# Patient Record
Sex: MALE | Race: WHITE | NOT HISPANIC OR LATINO | Employment: OTHER | URBAN - METROPOLITAN AREA
[De-identification: names, ages, dates, MRNs, and addresses within clinical notes are randomized per-mention and may not be internally consistent; named-entity substitution may affect disease eponyms.]

---

## 2017-12-08 ENCOUNTER — HOSPITAL ENCOUNTER (INPATIENT)
Facility: MEDICAL CENTER | Age: 52
LOS: 2 days | DRG: 247 | End: 2017-12-10
Attending: EMERGENCY MEDICINE | Admitting: HOSPITALIST
Payer: COMMERCIAL

## 2017-12-08 ENCOUNTER — HOSPITAL ENCOUNTER (OUTPATIENT)
Dept: RADIOLOGY | Facility: MEDICAL CENTER | Age: 52
End: 2017-12-08

## 2017-12-08 ENCOUNTER — RESOLUTE PROFESSIONAL BILLING HOSPITAL PROF FEE (OUTPATIENT)
Dept: HOSPITALIST | Facility: MEDICAL CENTER | Age: 52
End: 2017-12-08
Payer: COMMERCIAL

## 2017-12-08 ENCOUNTER — APPOINTMENT (OUTPATIENT)
Dept: RADIOLOGY | Facility: MEDICAL CENTER | Age: 52
DRG: 247 | End: 2017-12-08
Attending: INTERNAL MEDICINE
Payer: COMMERCIAL

## 2017-12-08 ENCOUNTER — APPOINTMENT (OUTPATIENT)
Dept: RADIOLOGY | Facility: MEDICAL CENTER | Age: 52
DRG: 247 | End: 2017-12-08
Attending: EMERGENCY MEDICINE
Payer: COMMERCIAL

## 2017-12-08 DIAGNOSIS — R51.9 ACUTE NONINTRACTABLE HEADACHE, UNSPECIFIED HEADACHE TYPE: ICD-10-CM

## 2017-12-08 DIAGNOSIS — I21.4 NSTEMI (NON-ST ELEVATED MYOCARDIAL INFARCTION) (HCC): ICD-10-CM

## 2017-12-08 PROBLEM — E11.9 TYPE 2 DIABETES MELLITUS (HCC): Status: ACTIVE | Noted: 2017-12-08

## 2017-12-08 PROBLEM — I10 ESSENTIAL HYPERTENSION: Status: ACTIVE | Noted: 2017-12-08

## 2017-12-08 PROBLEM — G43.909 MIGRAINE: Status: ACTIVE | Noted: 2017-12-08

## 2017-12-08 PROBLEM — E78.5 DYSLIPIDEMIA: Status: ACTIVE | Noted: 2017-12-08

## 2017-12-08 LAB
ALBUMIN SERPL BCP-MCNC: 4.3 G/DL (ref 3.2–4.9)
ALBUMIN/GLOB SERPL: 1.7 G/DL
ALP SERPL-CCNC: 81 U/L (ref 30–99)
ALT SERPL-CCNC: 23 U/L (ref 2–50)
ANION GAP SERPL CALC-SCNC: 11 MMOL/L (ref 0–11.9)
APTT PPP: 36.1 SEC (ref 24.7–36)
AST SERPL-CCNC: 25 U/L (ref 12–45)
BASOPHILS # BLD AUTO: 0.5 % (ref 0–1.8)
BASOPHILS # BLD: 0.06 K/UL (ref 0–0.12)
BILIRUB SERPL-MCNC: 0.6 MG/DL (ref 0.1–1.5)
BNP SERPL-MCNC: 42 PG/ML (ref 0–100)
BUN SERPL-MCNC: 14 MG/DL (ref 8–22)
CALCIUM SERPL-MCNC: 9.4 MG/DL (ref 8.5–10.5)
CHLORIDE SERPL-SCNC: 102 MMOL/L (ref 96–112)
CO2 SERPL-SCNC: 23 MMOL/L (ref 20–33)
CREAT SERPL-MCNC: 0.66 MG/DL (ref 0.5–1.4)
EKG IMPRESSION: NORMAL
EOSINOPHIL # BLD AUTO: 0.11 K/UL (ref 0–0.51)
EOSINOPHIL NFR BLD: 1 % (ref 0–6.9)
ERYTHROCYTE [DISTWIDTH] IN BLOOD BY AUTOMATED COUNT: 41.6 FL (ref 35.9–50)
EST. AVERAGE GLUCOSE BLD GHB EST-MCNC: 197 MG/DL
GFR SERPL CREATININE-BSD FRML MDRD: >60 ML/MIN/1.73 M 2
GLOBULIN SER CALC-MCNC: 2.5 G/DL (ref 1.9–3.5)
GLUCOSE BLD-MCNC: 223 MG/DL (ref 65–99)
GLUCOSE SERPL-MCNC: 157 MG/DL (ref 65–99)
HBA1C MFR BLD: 8.5 % (ref 0–5.6)
HCT VFR BLD AUTO: 42.5 % (ref 42–52)
HGB BLD-MCNC: 14.4 G/DL (ref 14–18)
IMM GRANULOCYTES # BLD AUTO: 0.03 K/UL (ref 0–0.11)
IMM GRANULOCYTES NFR BLD AUTO: 0.3 % (ref 0–0.9)
INR PPP: 1 (ref 0.87–1.13)
INR PPP: 1.1 (ref 0.87–1.13)
LIPASE SERPL-CCNC: 26 U/L (ref 11–82)
LYMPHOCYTES # BLD AUTO: 3.11 K/UL (ref 1–4.8)
LYMPHOCYTES NFR BLD: 28.2 % (ref 22–41)
MCH RBC QN AUTO: 29.5 PG (ref 27–33)
MCHC RBC AUTO-ENTMCNC: 33.9 G/DL (ref 33.7–35.3)
MCV RBC AUTO: 87.1 FL (ref 81.4–97.8)
MONOCYTES # BLD AUTO: 0.85 K/UL (ref 0–0.85)
MONOCYTES NFR BLD AUTO: 7.7 % (ref 0–13.4)
NEUTROPHILS # BLD AUTO: 6.86 K/UL (ref 1.82–7.42)
NEUTROPHILS NFR BLD: 62.3 % (ref 44–72)
NRBC # BLD AUTO: 0 K/UL
NRBC BLD AUTO-RTO: 0 /100 WBC
PLATELET # BLD AUTO: 304 K/UL (ref 164–446)
PMV BLD AUTO: 9.4 FL (ref 9–12.9)
POTASSIUM SERPL-SCNC: 3.9 MMOL/L (ref 3.6–5.5)
PROT SERPL-MCNC: 6.8 G/DL (ref 6–8.2)
PROTHROMBIN TIME: 12.9 SEC (ref 12–14.6)
PROTHROMBIN TIME: 13.9 SEC (ref 12–14.6)
RBC # BLD AUTO: 4.88 M/UL (ref 4.7–6.1)
SODIUM SERPL-SCNC: 136 MMOL/L (ref 135–145)
TROPONIN I SERPL-MCNC: 2.79 NG/ML (ref 0–0.04)
TROPONIN I SERPL-MCNC: 4.72 NG/ML (ref 0–0.04)
WBC # BLD AUTO: 11 K/UL (ref 4.8–10.8)

## 2017-12-08 PROCEDURE — 90732 PPSV23 VACC 2 YRS+ SUBQ/IM: CPT | Performed by: HOSPITALIST

## 2017-12-08 PROCEDURE — 85610 PROTHROMBIN TIME: CPT | Mod: 91

## 2017-12-08 PROCEDURE — 99285 EMERGENCY DEPT VISIT HI MDM: CPT

## 2017-12-08 PROCEDURE — 770020 HCHG ROOM/CARE - TELE (206)

## 2017-12-08 PROCEDURE — 80053 COMPREHEN METABOLIC PANEL: CPT

## 2017-12-08 PROCEDURE — 83690 ASSAY OF LIPASE: CPT

## 2017-12-08 PROCEDURE — 82962 GLUCOSE BLOOD TEST: CPT

## 2017-12-08 PROCEDURE — 700111 HCHG RX REV CODE 636 W/ 250 OVERRIDE (IP): Performed by: HOSPITALIST

## 2017-12-08 PROCEDURE — 3E0234Z INTRODUCTION OF SERUM, TOXOID AND VACCINE INTO MUSCLE, PERCUTANEOUS APPROACH: ICD-10-PCS | Performed by: HOSPITALIST

## 2017-12-08 PROCEDURE — A9270 NON-COVERED ITEM OR SERVICE: HCPCS | Performed by: INTERNAL MEDICINE

## 2017-12-08 PROCEDURE — 93005 ELECTROCARDIOGRAM TRACING: CPT

## 2017-12-08 PROCEDURE — 71020 DX-CHEST-2 VIEWS: CPT

## 2017-12-08 PROCEDURE — 93005 ELECTROCARDIOGRAM TRACING: CPT | Performed by: EMERGENCY MEDICINE

## 2017-12-08 PROCEDURE — 700102 HCHG RX REV CODE 250 W/ 637 OVERRIDE(OP): Performed by: STUDENT IN AN ORGANIZED HEALTH CARE EDUCATION/TRAINING PROGRAM

## 2017-12-08 PROCEDURE — 36415 COLL VENOUS BLD VENIPUNCTURE: CPT

## 2017-12-08 PROCEDURE — 99223 1ST HOSP IP/OBS HIGH 75: CPT | Performed by: HOSPITALIST

## 2017-12-08 PROCEDURE — 83880 ASSAY OF NATRIURETIC PEPTIDE: CPT

## 2017-12-08 PROCEDURE — 90471 IMMUNIZATION ADMIN: CPT

## 2017-12-08 PROCEDURE — 84484 ASSAY OF TROPONIN QUANT: CPT | Mod: 91

## 2017-12-08 PROCEDURE — 700102 HCHG RX REV CODE 250 W/ 637 OVERRIDE(OP): Performed by: INTERNAL MEDICINE

## 2017-12-08 PROCEDURE — 70450 CT HEAD/BRAIN W/O DYE: CPT

## 2017-12-08 PROCEDURE — 90686 IIV4 VACC NO PRSV 0.5 ML IM: CPT | Performed by: HOSPITALIST

## 2017-12-08 PROCEDURE — A9270 NON-COVERED ITEM OR SERVICE: HCPCS | Performed by: STUDENT IN AN ORGANIZED HEALTH CARE EDUCATION/TRAINING PROGRAM

## 2017-12-08 PROCEDURE — 83036 HEMOGLOBIN GLYCOSYLATED A1C: CPT

## 2017-12-08 PROCEDURE — 700111 HCHG RX REV CODE 636 W/ 250 OVERRIDE (IP): Performed by: INTERNAL MEDICINE

## 2017-12-08 PROCEDURE — 700105 HCHG RX REV CODE 258: Performed by: STUDENT IN AN ORGANIZED HEALTH CARE EDUCATION/TRAINING PROGRAM

## 2017-12-08 PROCEDURE — 93010 ELECTROCARDIOGRAM REPORT: CPT | Performed by: INTERNAL MEDICINE

## 2017-12-08 PROCEDURE — 85025 COMPLETE CBC W/AUTO DIFF WBC: CPT

## 2017-12-08 PROCEDURE — 83735 ASSAY OF MAGNESIUM: CPT

## 2017-12-08 PROCEDURE — 93005 ELECTROCARDIOGRAM TRACING: CPT | Performed by: STUDENT IN AN ORGANIZED HEALTH CARE EDUCATION/TRAINING PROGRAM

## 2017-12-08 PROCEDURE — 85730 THROMBOPLASTIN TIME PARTIAL: CPT

## 2017-12-08 RX ORDER — POLYETHYLENE GLYCOL 3350 17 G/17G
1 POWDER, FOR SOLUTION ORAL
Status: DISCONTINUED | OUTPATIENT
Start: 2017-12-08 | End: 2017-12-10 | Stop reason: HOSPADM

## 2017-12-08 RX ORDER — ONDANSETRON 2 MG/ML
4 INJECTION INTRAMUSCULAR; INTRAVENOUS EVERY 4 HOURS PRN
Status: DISCONTINUED | OUTPATIENT
Start: 2017-12-08 | End: 2017-12-09

## 2017-12-08 RX ORDER — LABETALOL HYDROCHLORIDE 5 MG/ML
10 INJECTION, SOLUTION INTRAVENOUS EVERY 4 HOURS PRN
Status: DISCONTINUED | OUTPATIENT
Start: 2017-12-08 | End: 2017-12-10 | Stop reason: HOSPADM

## 2017-12-08 RX ORDER — SODIUM CHLORIDE 9 MG/ML
INJECTION, SOLUTION INTRAVENOUS CONTINUOUS
Status: DISCONTINUED | OUTPATIENT
Start: 2017-12-08 | End: 2017-12-10

## 2017-12-08 RX ORDER — LISINOPRIL 10 MG/1
10 TABLET ORAL EVERY EVENING
Status: DISCONTINUED | OUTPATIENT
Start: 2017-12-08 | End: 2017-12-10 | Stop reason: HOSPADM

## 2017-12-08 RX ORDER — BISACODYL 10 MG
10 SUPPOSITORY, RECTAL RECTAL
Status: DISCONTINUED | OUTPATIENT
Start: 2017-12-08 | End: 2017-12-10 | Stop reason: HOSPADM

## 2017-12-08 RX ORDER — ATORVASTATIN CALCIUM 80 MG/1
80 TABLET, FILM COATED ORAL
Status: DISCONTINUED | OUTPATIENT
Start: 2017-12-08 | End: 2017-12-10 | Stop reason: HOSPADM

## 2017-12-08 RX ORDER — AMOXICILLIN 250 MG
2 CAPSULE ORAL 2 TIMES DAILY
Status: DISCONTINUED | OUTPATIENT
Start: 2017-12-08 | End: 2017-12-10 | Stop reason: HOSPADM

## 2017-12-08 RX ORDER — CHLORAL HYDRATE 500 MG
1000 CAPSULE ORAL 2 TIMES DAILY
COMMUNITY
End: 2018-01-29

## 2017-12-08 RX ORDER — HEPARIN SODIUM 1000 [USP'U]/ML
3800 INJECTION, SOLUTION INTRAVENOUS; SUBCUTANEOUS PRN
Status: DISCONTINUED | OUTPATIENT
Start: 2017-12-09 | End: 2017-12-08

## 2017-12-08 RX ORDER — LISINOPRIL 10 MG/1
10 TABLET ORAL EVERY MORNING
COMMUNITY
End: 2017-12-20 | Stop reason: SDUPTHER

## 2017-12-08 RX ORDER — ATORVASTATIN CALCIUM 20 MG/1
80 TABLET, FILM COATED ORAL EVERY MORNING
Status: DISCONTINUED | OUTPATIENT
Start: 2017-12-08 | End: 2017-12-08

## 2017-12-08 RX ORDER — PROMETHAZINE HYDROCHLORIDE 25 MG/1
12.5-25 SUPPOSITORY RECTAL EVERY 4 HOURS PRN
Status: DISCONTINUED | OUTPATIENT
Start: 2017-12-08 | End: 2017-12-10 | Stop reason: HOSPADM

## 2017-12-08 RX ORDER — ALBUTEROL SULFATE 90 UG/1
2 AEROSOL, METERED RESPIRATORY (INHALATION) EVERY 4 HOURS PRN
COMMUNITY

## 2017-12-08 RX ORDER — ATORVASTATIN CALCIUM 10 MG/1
10 TABLET, FILM COATED ORAL EVERY MORNING
Status: ON HOLD | COMMUNITY
End: 2017-12-10

## 2017-12-08 RX ORDER — BUTALBITAL, ACETAMINOPHEN AND CAFFEINE 50; 325; 40 MG/1; MG/1; MG/1
1 TABLET ORAL ONCE
Status: COMPLETED | OUTPATIENT
Start: 2017-12-08 | End: 2017-12-08

## 2017-12-08 RX ORDER — ONDANSETRON 4 MG/1
4 TABLET, ORALLY DISINTEGRATING ORAL EVERY 4 HOURS PRN
Status: DISCONTINUED | OUTPATIENT
Start: 2017-12-08 | End: 2017-12-10 | Stop reason: HOSPADM

## 2017-12-08 RX ORDER — ACETAMINOPHEN 325 MG/1
650 TABLET ORAL EVERY 6 HOURS PRN
Status: DISCONTINUED | OUTPATIENT
Start: 2017-12-08 | End: 2017-12-10 | Stop reason: HOSPADM

## 2017-12-08 RX ORDER — HEPARIN SODIUM 1000 [USP'U]/ML
3800 INJECTION, SOLUTION INTRAVENOUS; SUBCUTANEOUS PRN
Status: DISCONTINUED | OUTPATIENT
Start: 2017-12-08 | End: 2017-12-08

## 2017-12-08 RX ORDER — DEXTROSE MONOHYDRATE 25 G/50ML
25 INJECTION, SOLUTION INTRAVENOUS
Status: DISCONTINUED | OUTPATIENT
Start: 2017-12-08 | End: 2017-12-10 | Stop reason: HOSPADM

## 2017-12-08 RX ORDER — ALBUTEROL SULFATE 90 UG/1
2 AEROSOL, METERED RESPIRATORY (INHALATION) EVERY 4 HOURS PRN
Status: DISCONTINUED | OUTPATIENT
Start: 2017-12-08 | End: 2017-12-10 | Stop reason: HOSPADM

## 2017-12-08 RX ORDER — HEPARIN SODIUM 1000 [USP'U]/ML
3800 INJECTION, SOLUTION INTRAVENOUS; SUBCUTANEOUS PRN
Status: DISCONTINUED | OUTPATIENT
Start: 2017-12-08 | End: 2017-12-09

## 2017-12-08 RX ORDER — PROMETHAZINE HYDROCHLORIDE 25 MG/1
12.5-25 TABLET ORAL EVERY 4 HOURS PRN
Status: DISCONTINUED | OUTPATIENT
Start: 2017-12-08 | End: 2017-12-10 | Stop reason: HOSPADM

## 2017-12-08 RX ORDER — ASPIRIN 325 MG
325 TABLET ORAL DAILY
Status: DISCONTINUED | OUTPATIENT
Start: 2017-12-08 | End: 2017-12-09

## 2017-12-08 RX ORDER — SODIUM CHLORIDE 9 MG/ML
INJECTION, SOLUTION INTRAVENOUS CONTINUOUS
Status: DISCONTINUED | OUTPATIENT
Start: 2017-12-09 | End: 2017-12-09

## 2017-12-08 RX ADMIN — BUTALBITAL, ACETAMINOPHEN, AND CAFFEINE 1 TABLET: 50; 325; 40 TABLET ORAL at 20:10

## 2017-12-08 RX ADMIN — METOPROLOL TARTRATE 25 MG: 25 TABLET ORAL at 17:35

## 2017-12-08 RX ADMIN — PNEUMOCOCCAL VACCINE POLYVALENT 25 MCG
25; 25; 25; 25; 25; 25; 25; 25; 25; 25; 25; 25; 25; 25; 25; 25; 25; 25; 25; 25; 25; 25; 25 INJECTION, SOLUTION INTRAMUSCULAR; SUBCUTANEOUS at 21:05

## 2017-12-08 RX ADMIN — SODIUM CHLORIDE: 9 INJECTION, SOLUTION INTRAVENOUS at 21:22

## 2017-12-08 RX ADMIN — INSULIN HUMAN 3 UNITS: 100 INJECTION, SOLUTION PARENTERAL at 21:02

## 2017-12-08 RX ADMIN — INFLUENZA A VIRUS A/MICHIGAN/45/2015 X-275 (H1N1) ANTIGEN (FORMALDEHYDE INACTIVATED), INFLUENZA A VIRUS A/HONG KONG/4801/2014 X-263B (H3N2) ANTIGEN (FORMALDEHYDE INACTIVATED), INFLUENZA B VIRUS B/PHUKET/3073/2013 ANTIGEN (FORMALDEHYDE INACTIVATED), AND INFLUENZA B VIRUS B/BRISBANE/60/2008 ANTIGEN (FORMALDEHYDE INACTIVATED) 0.5 ML: 15; 15; 15; 15 INJECTION, SUSPENSION INTRAMUSCULAR at 21:03

## 2017-12-08 RX ADMIN — HEPARIN SODIUM 1450 UNITS/HR: 5000 INJECTION, SOLUTION INTRAVENOUS at 22:33

## 2017-12-08 RX ADMIN — ATORVASTATIN CALCIUM 80 MG: 80 TABLET, FILM COATED ORAL at 21:03

## 2017-12-08 RX ADMIN — LISINOPRIL 10 MG: 10 TABLET ORAL at 21:03

## 2017-12-08 RX ADMIN — STANDARDIZED SENNA CONCENTRATE AND DOCUSATE SODIUM 2 TABLET: 8.6; 5 TABLET, FILM COATED ORAL at 21:03

## 2017-12-08 ASSESSMENT — PATIENT HEALTH QUESTIONNAIRE - PHQ9
SUM OF ALL RESPONSES TO PHQ9 QUESTIONS 1 AND 2: 0
2. FEELING DOWN, DEPRESSED, IRRITABLE, OR HOPELESS: NOT AT ALL
SUM OF ALL RESPONSES TO PHQ QUESTIONS 1-9: 0
1. LITTLE INTEREST OR PLEASURE IN DOING THINGS: NOT AT ALL

## 2017-12-08 ASSESSMENT — ENCOUNTER SYMPTOMS
SENSORY CHANGE: 0
FOCAL WEAKNESS: 0
CLAUDICATION: 0
BRUISES/BLEEDS EASILY: 0
CONSTIPATION: 0
SPUTUM PRODUCTION: 0
PALPITATIONS: 0
VOMITING: 0
ABDOMINAL PAIN: 0
DOUBLE VISION: 0
TREMORS: 0
WEIGHT LOSS: 0
SEIZURES: 0
FEVER: 0
CHILLS: 0
SPEECH CHANGE: 0
NAUSEA: 0
POLYDIPSIA: 0
WHEEZING: 0
COUGH: 0
HEADACHES: 1
ORTHOPNEA: 0
DIAPHORESIS: 0
DIARRHEA: 0
SHORTNESS OF BREATH: 1
DEPRESSION: 0
TINGLING: 0
DIZZINESS: 0
HEARTBURN: 0
BLURRED VISION: 0
BACK PAIN: 0
PHOTOPHOBIA: 0
NECK PAIN: 0

## 2017-12-08 ASSESSMENT — LIFESTYLE VARIABLES
CONSUMPTION TOTAL: NEGATIVE
TOTAL SCORE: 0
HAVE PEOPLE ANNOYED YOU BY CRITICIZING YOUR DRINKING: NO
EVER HAD A DRINK FIRST THING IN THE MORNING TO STEADY YOUR NERVES TO GET RID OF A HANGOVER: NO
ON A TYPICAL DAY WHEN YOU DRINK ALCOHOL HOW MANY DRINKS DO YOU HAVE: 1
TOTAL SCORE: 0
EVER FELT BAD OR GUILTY ABOUT YOUR DRINKING: NO
SUBSTANCE_ABUSE: 0
TOTAL SCORE: 0
HAVE YOU EVER FELT YOU SHOULD CUT DOWN ON YOUR DRINKING: NO
EVER_SMOKED: YES
AVERAGE NUMBER OF DAYS PER WEEK YOU HAVE A DRINK CONTAINING ALCOHOL: .5
ALCOHOL_USE: YES
HOW MANY TIMES IN THE PAST YEAR HAVE YOU HAD 5 OR MORE DRINKS IN A DAY: 0

## 2017-12-08 ASSESSMENT — PAIN SCALES - GENERAL
PAINLEVEL_OUTOF10: 4
PAINLEVEL_OUTOF10: 4

## 2017-12-08 NOTE — ED PROVIDER NOTES
ED Provider Note    Scribed for Victor M Dial M.D. by Sridevi Fajardo. 12/8/2017  3:14 PM    Primary care provider: No primary care provider   Means of arrival: EMS  History obtained from: patient  History limited by: none    CHIEF COMPLAINT  Chief Complaint   Patient presents with   • Migraine     since sunday   • Abnormal Labs     trop 1.7     HPI  Benoit Barkley is a 52 y.o. Male with a history of diabetes, hypertension, and migraines who presents to the Emergency Department by [facility].  The patient describes this as normal migraine pain, but it radiated down to his chest as well. He denies any chest pain at this time. The patient denies any shortness of breath, leg swelling, or diaphoresis. The [facility] which [facility]. He denies any recent head trauma. But he has recently altered his diabetes medications. The patient has a familial history of heart attack and states that his mother had a heart attack at 40. He denies any blood thinners.     REVIEW OF SYSTEMS  Pertinent positives include migraine. Pertinent negatives include no shortness of breath, leg swelling, diaphoresis.  All other systems reviewed and negative. C    PAST MEDICAL HISTORY   has a past medical history of Dyslipidemia (12/8/2017) and NSTEMI (non-ST elevated myocardial infarction) (CMS-HCC) (12/8/2017). No chronic medical problems    SURGICAL HISTORY  patient denies any surgical history    SOCIAL HISTORY  Social History   Substance Use Topics   • Smoking status: No   • Smokeless tobacco: No   • Alcohol use No      History   Drug use: Unknown     FAMILY HISTORY  History reviewed. No pertinent family history.    CURRENT MEDICATIONS  Home Medications     Reviewed by Trav Wilson (Pharmacy Tech) on 12/08/17 at 1610  Med List Status: Complete   Medication Last Dose Status   albuterol 108 (90 Base) MCG/ACT Aero Soln inhalation  "aerosol 12/4/2017 Active   aspirin EC (ECOTRIN) 81 MG Tablet Delayed Response 12/8/2017 Active   atorvastatin (LIPITOR) 10 MG Tab 12/8/2017 Active   lisinopril (PRINIVIL) 10 MG Tab 12/7/2017 Active   metformin (GLUCOPHAGE) 1000 MG tablet 12/8/2017 Active   multivitamin (THERAGRAN) Tab 12/8/2017 Active   Omega-3 Fatty Acids (FISH OIL) 1000 MG Cap capsule 12/8/2017 Active              ALLERGIES  No Known Allergies    PHYSICAL EXAM  VITAL SIGNS: /64   Pulse 82   Temp 37.2 °C (99 °F)   Resp 18   Ht 1.93 m (6' 4\")   Wt 116.6 kg (257 lb)   SpO2 91%   BMI 31.28 kg/m²     Constitutional: Well developed, Well nourished, no distress, Non-toxic appearance.   HENT: Normocephalic, Atraumatic, Bilateral external ears normal, Oropharynx moist, No oral exudates.   Eyes: PERRLA, EOMI, Conjunctiva normal, No discharge.   Neck: No tenderness, Supple, No stridor.   Lymphatic: No lymphadenopathy noted.   Cardiovascular: Normal heart rate, Normal rhythm.   Thorax & Lungs: Clear to auscultation bilaterally, No respiratory distress, No wheezing, No crackles.   Abdomen: Soft, No tenderness, No masses, No pulsatile masses.   Skin: Warm, Dry, No erythema, No rash.   Extremities:, No edema No cyanosis.   Musculoskeletal: No major deformities noted.  Intact distal pulses. Slight paraspinal tenderness  Neurologic: Awake, alert. Moves all extremities spontaneously.  Psychiatric: Affect normal, Judgment normal, Mood normal.     LABS  Results for orders placed or performed during the hospital encounter of 12/08/17   Troponin   Result Value Ref Range    Troponin I 4.72 (H) 0.00 - 0.04 ng/mL   Btype Natriuretic Peptide   Result Value Ref Range    B Natriuretic Peptide 42 0 - 100 pg/mL   CBC with Differential   Result Value Ref Range    WBC 11.0 (H) 4.8 - 10.8 K/uL    RBC 4.88 4.70 - 6.10 M/uL    Hemoglobin 14.4 14.0 - 18.0 g/dL    Hematocrit 42.5 42.0 - 52.0 %    MCV 87.1 81.4 - 97.8 fL    MCH 29.5 27.0 - 33.0 pg    MCHC 33.9 33.7 - 35.3 " g/dL    RDW 41.6 35.9 - 50.0 fL    Platelet Count 304 164 - 446 K/uL    MPV 9.4 9.0 - 12.9 fL    Neutrophils-Polys 62.30 44.00 - 72.00 %    Lymphocytes 28.20 22.00 - 41.00 %    Monocytes 7.70 0.00 - 13.40 %    Eosinophils 1.00 0.00 - 6.90 %    Basophils 0.50 0.00 - 1.80 %    Immature Granulocytes 0.30 0.00 - 0.90 %    Nucleated RBC 0.00 /100 WBC    Neutrophils (Absolute) 6.86 1.82 - 7.42 K/uL    Lymphs (Absolute) 3.11 1.00 - 4.80 K/uL    Monos (Absolute) 0.85 0.00 - 0.85 K/uL    Eos (Absolute) 0.11 0.00 - 0.51 K/uL    Baso (Absolute) 0.06 0.00 - 0.12 K/uL    Immature Granulocytes (abs) 0.03 0.00 - 0.11 K/uL    NRBC (Absolute) 0.00 K/uL   Complete Metabolic Panel (CMP)   Result Value Ref Range    Sodium 136 135 - 145 mmol/L    Potassium 3.9 3.6 - 5.5 mmol/L    Chloride 102 96 - 112 mmol/L    Co2 23 20 - 33 mmol/L    Anion Gap 11.0 0.0 - 11.9    Glucose 157 (H) 65 - 99 mg/dL    Bun 14 8 - 22 mg/dL    Creatinine 0.66 0.50 - 1.40 mg/dL    Calcium 9.4 8.5 - 10.5 mg/dL    AST(SGOT) 25 12 - 45 U/L    ALT(SGPT) 23 2 - 50 U/L    Alkaline Phosphatase 81 30 - 99 U/L    Total Bilirubin 0.6 0.1 - 1.5 mg/dL    Albumin 4.3 3.2 - 4.9 g/dL    Total Protein 6.8 6.0 - 8.2 g/dL    Globulin 2.5 1.9 - 3.5 g/dL    A-G Ratio 1.7 g/dL   Prothrombin Time   Result Value Ref Range    PT 12.9 12.0 - 14.6 sec    INR 1.00 0.87 - 1.13   APTT   Result Value Ref Range    APTT 36.1 (H) 24.7 - 36.0 sec   Lipase   Result Value Ref Range    Lipase 26 11 - 82 U/L   ESTIMATED GFR   Result Value Ref Range    GFR If African American >60 >60 mL/min/1.73 m 2    GFR If Non African American >60 >60 mL/min/1.73 m 2   EKG (NOW)   Result Value Ref Range    Report       [facility] Emergency Dept.    Test Date:  2017  Pt Name:    CAMPBELL ROBLES                   Department: ER  MRN:        [id number]                      Room:       [facility]  Gender:     CHARLIE                            Technician: [id number]  :        1965                    Requested By:ER TRIAGE PROTOCOL  Order #:    [id number]                    Reading MD:    Measurements  Intervals                                Axis  Rate:       77                           P:          49  AR:         156                          QRS:        11  QRSD:       102                          T:          -4  QT:         380  QTc:        431    Interpretive Statements  SINUS RHYTHM  PROBABLE INFERIOR INFARCT, AGE INDETERMINATE  No previous ECG available for comparison     All labs reviewed by me.    RADIOLOGY  CT-HEAD W/O   Final Result      No evidence of acute intracranial process.      OUTSIDE IMAGES-DX CHEST   Final Result      ECHOCARDIOGRAM COMP W/O CONT    (Results Pending)   DX-CHEST-2 VIEWS    (Results Pending)     The radiologist's interpretation of all radiological studies have been reviewed by me.    COURSE & MEDICAL DECISION MAKING  Pertinent Labs & Imaging studies reviewed. (See chart for details)    I reviewed the patient's medical records which showed hemoglobin 14, troponin 1.7, negative d-dimer.     3:14 PM - Patient seen and examined at bedside Ordered CT head, troponin, BNP, CBC with differential, CMP, PTT, APTT, lipase, estimated GFR, EKG  to evaluate his symptoms.     4:00 PM I spoke with Dr. Bobo about patient's case, he will consult.     4:06 PM Spoke with [facility] about the patient's condition. They will admit the patient, care is transferred at this time.     4:38 PM Start Heparin drip     Decision Making:  Patient with migraine headache, get a CT scan of the head to make sure the patient did not have any intracranial process. [facility] repeat troponin here was 4, discussed the case with the internal medicine for admission the hospital, discussed the case with cardiology for consultation, we started a heparin drip here in the emergency department.  DISPOSITION:  Patient will be admitted to [facility] IM in guarded condition.    FINAL IMPRESSION  1. NSTEMI (non-ST  elevated myocardial infarction) (CMS-HCC)    2. Acute nonintractable headache, unspecified headache type     3. Critical care time of 35 minutes     I, Sridevi Fajardo (Scribe), am scribing for, and in the presence of, Victor M Dial M.D..    Electronically signed by: Sridevi Fajardo (Scribe), 12/8/2017    I, Victor M Dial M.D. personally performed the services described in this documentation, as scribed by Sridevi Fajardo in my presence, and it is both accurate and complete.    The note accurately reflects work and decisions made by me.  Victor M Dial  12/8/2017  5:45 PM

## 2017-12-08 NOTE — ED NOTES
[facility]. Migraines since aug 1988 [facility]t [facility]. Denies CP or SOB. A&Ox4.

## 2017-12-09 LAB
ACT BLD: 158 SEC (ref 74–137)
ACT BLD: 197 SEC (ref 74–137)
ALBUMIN SERPL BCP-MCNC: 3.8 G/DL (ref 3.2–4.9)
ALBUMIN/GLOB SERPL: 1.7 G/DL
ALP SERPL-CCNC: 79 U/L (ref 30–99)
ALT SERPL-CCNC: 19 U/L (ref 2–50)
ANION GAP SERPL CALC-SCNC: 10 MMOL/L (ref 0–11.9)
APTT PPP: 49.4 SEC (ref 24.7–36)
AST SERPL-CCNC: 20 U/L (ref 12–45)
BASOPHILS # BLD AUTO: 0.5 % (ref 0–1.8)
BASOPHILS # BLD: 0.04 K/UL (ref 0–0.12)
BILIRUB SERPL-MCNC: 0.4 MG/DL (ref 0.1–1.5)
BUN SERPL-MCNC: 15 MG/DL (ref 8–22)
CALCIUM SERPL-MCNC: 9.3 MG/DL (ref 8.5–10.5)
CHLORIDE SERPL-SCNC: 102 MMOL/L (ref 96–112)
CHOLEST SERPL-MCNC: 126 MG/DL (ref 100–199)
CO2 SERPL-SCNC: 24 MMOL/L (ref 20–33)
CREAT SERPL-MCNC: 0.67 MG/DL (ref 0.5–1.4)
EKG IMPRESSION: NORMAL
EKG IMPRESSION: NORMAL
EOSINOPHIL # BLD AUTO: 0.1 K/UL (ref 0–0.51)
EOSINOPHIL NFR BLD: 1.3 % (ref 0–6.9)
ERYTHROCYTE [DISTWIDTH] IN BLOOD BY AUTOMATED COUNT: 42.5 FL (ref 35.9–50)
GFR SERPL CREATININE-BSD FRML MDRD: >60 ML/MIN/1.73 M 2
GLOBULIN SER CALC-MCNC: 2.3 G/DL (ref 1.9–3.5)
GLUCOSE BLD-MCNC: 216 MG/DL (ref 65–99)
GLUCOSE BLD-MCNC: 234 MG/DL (ref 65–99)
GLUCOSE BLD-MCNC: 249 MG/DL (ref 65–99)
GLUCOSE SERPL-MCNC: 206 MG/DL (ref 65–99)
HCT VFR BLD AUTO: 41.1 % (ref 42–52)
HDLC SERPL-MCNC: 28 MG/DL
HGB BLD-MCNC: 13.8 G/DL (ref 14–18)
IMM GRANULOCYTES # BLD AUTO: 0.03 K/UL (ref 0–0.11)
IMM GRANULOCYTES NFR BLD AUTO: 0.4 % (ref 0–0.9)
LDLC SERPL CALC-MCNC: ABNORMAL MG/DL
LV EJECT FRACT  99904: 65
LV EJECT FRACT MOD 2C 99903: 69.74
LV EJECT FRACT MOD 4C 99902: 61.63
LV EJECT FRACT MOD BP 99901: 63.26
LYMPHOCYTES # BLD AUTO: 2.62 K/UL (ref 1–4.8)
LYMPHOCYTES NFR BLD: 34.1 % (ref 22–41)
MAGNESIUM SERPL-MCNC: 1.8 MG/DL (ref 1.5–2.5)
MAGNESIUM SERPL-MCNC: 1.8 MG/DL (ref 1.5–2.5)
MCH RBC QN AUTO: 29.7 PG (ref 27–33)
MCHC RBC AUTO-ENTMCNC: 33.6 G/DL (ref 33.7–35.3)
MCV RBC AUTO: 88.6 FL (ref 81.4–97.8)
MONOCYTES # BLD AUTO: 0.73 K/UL (ref 0–0.85)
MONOCYTES NFR BLD AUTO: 9.5 % (ref 0–13.4)
NEUTROPHILS # BLD AUTO: 4.16 K/UL (ref 1.82–7.42)
NEUTROPHILS NFR BLD: 54.2 % (ref 44–72)
NRBC # BLD AUTO: 0 K/UL
NRBC BLD AUTO-RTO: 0 /100 WBC
PLATELET # BLD AUTO: 269 K/UL (ref 164–446)
PMV BLD AUTO: 9.6 FL (ref 9–12.9)
POTASSIUM SERPL-SCNC: 4.2 MMOL/L (ref 3.6–5.5)
PROT SERPL-MCNC: 6.1 G/DL (ref 6–8.2)
RBC # BLD AUTO: 4.64 M/UL (ref 4.7–6.1)
SODIUM SERPL-SCNC: 136 MMOL/L (ref 135–145)
TRIGL SERPL-MCNC: 495 MG/DL (ref 0–149)
TROPONIN I SERPL-MCNC: 3.66 NG/ML (ref 0–0.04)
TSH SERPL DL<=0.005 MIU/L-ACNC: 3.34 UIU/ML (ref 0.3–3.7)
WBC # BLD AUTO: 7.7 K/UL (ref 4.8–10.8)

## 2017-12-09 PROCEDURE — C1887 CATHETER, GUIDING: HCPCS

## 2017-12-09 PROCEDURE — 93306 TTE W/DOPPLER COMPLETE: CPT | Mod: 26 | Performed by: INTERNAL MEDICINE

## 2017-12-09 PROCEDURE — 700105 HCHG RX REV CODE 258: Performed by: INTERNAL MEDICINE

## 2017-12-09 PROCEDURE — 93010 ELECTROCARDIOGRAM REPORT: CPT | Performed by: INTERNAL MEDICINE

## 2017-12-09 PROCEDURE — 83735 ASSAY OF MAGNESIUM: CPT

## 2017-12-09 PROCEDURE — 4A023N7 MEASUREMENT OF CARDIAC SAMPLING AND PRESSURE, LEFT HEART, PERCUTANEOUS APPROACH: ICD-10-PCS | Performed by: INTERNAL MEDICINE

## 2017-12-09 PROCEDURE — 700111 HCHG RX REV CODE 636 W/ 250 OVERRIDE (IP): Performed by: INTERNAL MEDICINE

## 2017-12-09 PROCEDURE — 82962 GLUCOSE BLOOD TEST: CPT | Mod: 91

## 2017-12-09 PROCEDURE — 304952 HCHG R 2 PADS

## 2017-12-09 PROCEDURE — B2151ZZ FLUOROSCOPY OF LEFT HEART USING LOW OSMOLAR CONTRAST: ICD-10-PCS | Performed by: INTERNAL MEDICINE

## 2017-12-09 PROCEDURE — 93306 TTE W/DOPPLER COMPLETE: CPT

## 2017-12-09 PROCEDURE — C1725 CATH, TRANSLUMIN NON-LASER: HCPCS

## 2017-12-09 PROCEDURE — C1874 STENT, COATED/COV W/DEL SYS: HCPCS

## 2017-12-09 PROCEDURE — C1769 GUIDE WIRE: HCPCS

## 2017-12-09 PROCEDURE — B2111ZZ FLUOROSCOPY OF MULTIPLE CORONARY ARTERIES USING LOW OSMOLAR CONTRAST: ICD-10-PCS | Performed by: INTERNAL MEDICINE

## 2017-12-09 PROCEDURE — A9270 NON-COVERED ITEM OR SERVICE: HCPCS | Performed by: STUDENT IN AN ORGANIZED HEALTH CARE EDUCATION/TRAINING PROGRAM

## 2017-12-09 PROCEDURE — 360979 HCHG DIAGNOSTIC CATH

## 2017-12-09 PROCEDURE — 85730 THROMBOPLASTIN TIME PARTIAL: CPT

## 2017-12-09 PROCEDURE — 700102 HCHG RX REV CODE 250 W/ 637 OVERRIDE(OP)

## 2017-12-09 PROCEDURE — 99233 SBSQ HOSP IP/OBS HIGH 50: CPT | Performed by: HOSPITALIST

## 2017-12-09 PROCEDURE — 85347 COAGULATION TIME ACTIVATED: CPT | Mod: 91

## 2017-12-09 PROCEDURE — 770020 HCHG ROOM/CARE - TELE (206)

## 2017-12-09 PROCEDURE — C9606 PERC D-E COR REVASC W AMI S: HCPCS | Mod: LC

## 2017-12-09 PROCEDURE — 700102 HCHG RX REV CODE 250 W/ 637 OVERRIDE(OP): Performed by: INTERNAL MEDICINE

## 2017-12-09 PROCEDURE — 700111 HCHG RX REV CODE 636 W/ 250 OVERRIDE (IP): Performed by: STUDENT IN AN ORGANIZED HEALTH CARE EDUCATION/TRAINING PROGRAM

## 2017-12-09 PROCEDURE — 85025 COMPLETE CBC W/AUTO DIFF WBC: CPT

## 2017-12-09 PROCEDURE — A9270 NON-COVERED ITEM OR SERVICE: HCPCS | Performed by: INTERNAL MEDICINE

## 2017-12-09 PROCEDURE — 80053 COMPREHEN METABOLIC PANEL: CPT

## 2017-12-09 PROCEDURE — 700117 HCHG RX CONTRAST REV CODE 255: Performed by: INTERNAL MEDICINE

## 2017-12-09 PROCEDURE — 80061 LIPID PANEL: CPT

## 2017-12-09 PROCEDURE — 99153 MOD SED SAME PHYS/QHP EA: CPT

## 2017-12-09 PROCEDURE — 99152 MOD SED SAME PHYS/QHP 5/>YRS: CPT

## 2017-12-09 PROCEDURE — A9270 NON-COVERED ITEM OR SERVICE: HCPCS

## 2017-12-09 PROCEDURE — 84484 ASSAY OF TROPONIN QUANT: CPT

## 2017-12-09 PROCEDURE — 84443 ASSAY THYROID STIM HORMONE: CPT

## 2017-12-09 PROCEDURE — 700102 HCHG RX REV CODE 250 W/ 637 OVERRIDE(OP): Performed by: STUDENT IN AN ORGANIZED HEALTH CARE EDUCATION/TRAINING PROGRAM

## 2017-12-09 PROCEDURE — 93458 L HRT ARTERY/VENTRICLE ANGIO: CPT

## 2017-12-09 PROCEDURE — C1894 INTRO/SHEATH, NON-LASER: HCPCS

## 2017-12-09 PROCEDURE — 700111 HCHG RX REV CODE 636 W/ 250 OVERRIDE (IP)

## 2017-12-09 PROCEDURE — 027034Z DILATION OF CORONARY ARTERY, ONE ARTERY WITH DRUG-ELUTING INTRALUMINAL DEVICE, PERCUTANEOUS APPROACH: ICD-10-PCS | Performed by: INTERNAL MEDICINE

## 2017-12-09 PROCEDURE — 36415 COLL VENOUS BLD VENIPUNCTURE: CPT

## 2017-12-09 PROCEDURE — 700101 HCHG RX REV CODE 250

## 2017-12-09 PROCEDURE — 93005 ELECTROCARDIOGRAM TRACING: CPT | Performed by: STUDENT IN AN ORGANIZED HEALTH CARE EDUCATION/TRAINING PROGRAM

## 2017-12-09 PROCEDURE — C9600 PERC DRUG-EL COR STENT SING: HCPCS | Mod: LC

## 2017-12-09 PROCEDURE — 307093 HCHG TR BAND RADIAL

## 2017-12-09 RX ORDER — HEPARIN SODIUM,PORCINE 1000/ML
VIAL (ML) INJECTION
Status: COMPLETED
Start: 2017-12-09 | End: 2017-12-09

## 2017-12-09 RX ORDER — PRASUGREL 10 MG/1
10 TABLET, FILM COATED ORAL DAILY
Status: DISCONTINUED | OUTPATIENT
Start: 2017-12-10 | End: 2017-12-10 | Stop reason: HOSPADM

## 2017-12-09 RX ORDER — ONDANSETRON 2 MG/ML
4 INJECTION INTRAMUSCULAR; INTRAVENOUS EVERY 4 HOURS PRN
Status: DISCONTINUED | OUTPATIENT
Start: 2017-12-09 | End: 2017-12-10 | Stop reason: HOSPADM

## 2017-12-09 RX ORDER — DEXAMETHASONE SODIUM PHOSPHATE 4 MG/ML
4 INJECTION, SOLUTION INTRA-ARTICULAR; INTRALESIONAL; INTRAMUSCULAR; INTRAVENOUS; SOFT TISSUE
Status: DISCONTINUED | OUTPATIENT
Start: 2017-12-09 | End: 2017-12-10 | Stop reason: HOSPADM

## 2017-12-09 RX ORDER — DIPHENHYDRAMINE HYDROCHLORIDE 50 MG/ML
25 INJECTION INTRAMUSCULAR; INTRAVENOUS EVERY 6 HOURS PRN
Status: DISCONTINUED | OUTPATIENT
Start: 2017-12-09 | End: 2017-12-10 | Stop reason: HOSPADM

## 2017-12-09 RX ORDER — PRASUGREL 10 MG/1
60 TABLET, FILM COATED ORAL ONCE
Status: ACTIVE | OUTPATIENT
Start: 2017-12-09 | End: 2017-12-10

## 2017-12-09 RX ORDER — HALOPERIDOL 5 MG/ML
1 INJECTION INTRAMUSCULAR EVERY 6 HOURS PRN
Status: DISCONTINUED | OUTPATIENT
Start: 2017-12-09 | End: 2017-12-10 | Stop reason: HOSPADM

## 2017-12-09 RX ORDER — MIDAZOLAM HYDROCHLORIDE 1 MG/ML
INJECTION INTRAMUSCULAR; INTRAVENOUS
Status: COMPLETED
Start: 2017-12-09 | End: 2017-12-09

## 2017-12-09 RX ORDER — LIDOCAINE HYDROCHLORIDE 20 MG/ML
INJECTION, SOLUTION INFILTRATION; PERINEURAL
Status: COMPLETED
Start: 2017-12-09 | End: 2017-12-09

## 2017-12-09 RX ORDER — PRASUGREL 10 MG/1
TABLET, FILM COATED ORAL
Status: COMPLETED
Start: 2017-12-09 | End: 2017-12-09

## 2017-12-09 RX ORDER — ACETAMINOPHEN 500 MG
1000 TABLET ORAL EVERY 6 HOURS
Status: DISCONTINUED | OUTPATIENT
Start: 2017-12-09 | End: 2017-12-10

## 2017-12-09 RX ORDER — SODIUM CHLORIDE 9 MG/ML
INJECTION, SOLUTION INTRAVENOUS CONTINUOUS
Status: DISCONTINUED | OUTPATIENT
Start: 2017-12-09 | End: 2017-12-10

## 2017-12-09 RX ORDER — SODIUM CHLORIDE 9 MG/ML
INJECTION, SOLUTION INTRAVENOUS CONTINUOUS
Status: ACTIVE | OUTPATIENT
Start: 2017-12-09 | End: 2017-12-09

## 2017-12-09 RX ORDER — VERAPAMIL HYDROCHLORIDE 2.5 MG/ML
INJECTION, SOLUTION INTRAVENOUS
Status: COMPLETED
Start: 2017-12-09 | End: 2017-12-09

## 2017-12-09 RX ORDER — MAGNESIUM SULFATE HEPTAHYDRATE 40 MG/ML
2 INJECTION, SOLUTION INTRAVENOUS ONCE
Status: COMPLETED | OUTPATIENT
Start: 2017-12-09 | End: 2017-12-09

## 2017-12-09 RX ORDER — SCOLOPAMINE TRANSDERMAL SYSTEM 1 MG/1
1 PATCH, EXTENDED RELEASE TRANSDERMAL
Status: DISCONTINUED | OUTPATIENT
Start: 2017-12-09 | End: 2017-12-10 | Stop reason: HOSPADM

## 2017-12-09 RX ADMIN — MIDAZOLAM 2 MG: 1 INJECTION INTRAMUSCULAR; INTRAVENOUS at 09:44

## 2017-12-09 RX ADMIN — LISINOPRIL 10 MG: 10 TABLET ORAL at 20:22

## 2017-12-09 RX ADMIN — MIDAZOLAM 0.5 MG: 1 INJECTION INTRAMUSCULAR; INTRAVENOUS at 10:15

## 2017-12-09 RX ADMIN — HEPARIN SODIUM 2000 UNITS: 200 INJECTION, SOLUTION INTRAVENOUS at 09:45

## 2017-12-09 RX ADMIN — HEPARIN SODIUM 3800 UNITS: 1000 INJECTION, SOLUTION INTRAVENOUS; SUBCUTANEOUS at 05:41

## 2017-12-09 RX ADMIN — ATORVASTATIN CALCIUM 80 MG: 80 TABLET, FILM COATED ORAL at 20:22

## 2017-12-09 RX ADMIN — INSULIN HUMAN 3 UNITS: 100 INJECTION, SOLUTION PARENTERAL at 17:38

## 2017-12-09 RX ADMIN — ACETAMINOPHEN 1000 MG: 500 TABLET ORAL at 23:15

## 2017-12-09 RX ADMIN — ACETAMINOPHEN 1000 MG: 500 TABLET ORAL at 11:55

## 2017-12-09 RX ADMIN — VERAPAMIL HYDROCHLORIDE 5 MG: 2.5 INJECTION, SOLUTION INTRAVENOUS at 09:44

## 2017-12-09 RX ADMIN — INSULIN HUMAN 3 UNITS: 100 INJECTION, SOLUTION PARENTERAL at 20:23

## 2017-12-09 RX ADMIN — IOHEXOL 204 ML: 350 INJECTION, SOLUTION INTRAVENOUS at 10:24

## 2017-12-09 RX ADMIN — HEPARIN SODIUM: 1000 INJECTION, SOLUTION INTRAVENOUS; SUBCUTANEOUS at 09:44

## 2017-12-09 RX ADMIN — STANDARDIZED SENNA CONCENTRATE AND DOCUSATE SODIUM 2 TABLET: 8.6; 5 TABLET, FILM COATED ORAL at 20:22

## 2017-12-09 RX ADMIN — Medication 60 MG: at 10:15

## 2017-12-09 RX ADMIN — FENTANYL CITRATE 75 MCG: 50 INJECTION, SOLUTION INTRAMUSCULAR; INTRAVENOUS at 09:44

## 2017-12-09 RX ADMIN — SODIUM CHLORIDE: 9 INJECTION, SOLUTION INTRAVENOUS at 11:10

## 2017-12-09 RX ADMIN — METOPROLOL TARTRATE 25 MG: 25 TABLET ORAL at 23:15

## 2017-12-09 RX ADMIN — MAGNESIUM SULFATE IN WATER 2 G: 40 INJECTION, SOLUTION INTRAVENOUS at 06:50

## 2017-12-09 RX ADMIN — METOPROLOL TARTRATE 25 MG: 25 TABLET ORAL at 08:07

## 2017-12-09 RX ADMIN — SODIUM CHLORIDE: 9 INJECTION, SOLUTION INTRAVENOUS at 17:40

## 2017-12-09 RX ADMIN — ACETAMINOPHEN 1000 MG: 500 TABLET ORAL at 17:42

## 2017-12-09 RX ADMIN — NITROGLYCERIN 10 ML: 20 INJECTION INTRAVENOUS at 09:45

## 2017-12-09 RX ADMIN — LIDOCAINE HYDROCHLORIDE: 20 INJECTION, SOLUTION INFILTRATION; PERINEURAL at 09:44

## 2017-12-09 RX ADMIN — ASPIRIN 325 MG: 325 TABLET, COATED ORAL at 08:07

## 2017-12-09 ASSESSMENT — ENCOUNTER SYMPTOMS
DIZZINESS: 0
DEPRESSION: 0
BACK PAIN: 0
BLOOD IN STOOL: 0
EYE PAIN: 0
CONSTIPATION: 0
POLYDIPSIA: 0
DIAPHORESIS: 0
BRUISES/BLEEDS EASILY: 0
MYALGIAS: 0
SPEECH CHANGE: 0
CLAUDICATION: 0
SHORTNESS OF BREATH: 0
FALLS: 0
CHILLS: 0
WHEEZING: 0
WEIGHT LOSS: 0
HALLUCINATIONS: 0
DOUBLE VISION: 0
STRIDOR: 0
BLURRED VISION: 0
VOMITING: 0
NAUSEA: 0
PHOTOPHOBIA: 0
HEARTBURN: 0
ABDOMINAL PAIN: 0
COUGH: 0
HEADACHES: 0
SENSORY CHANGE: 0
EYE DISCHARGE: 0
PND: 0
DIARRHEA: 0
PALPITATIONS: 0
ORTHOPNEA: 0
INSOMNIA: 0
FOCAL WEAKNESS: 0
NERVOUS/ANXIOUS: 1
FEVER: 0
LOSS OF CONSCIOUSNESS: 0
SPUTUM PRODUCTION: 0

## 2017-12-09 ASSESSMENT — PAIN SCALES - GENERAL
PAINLEVEL_OUTOF10: 3
PAINLEVEL_OUTOF10: 4
PAINLEVEL_OUTOF10: 3

## 2017-12-09 NOTE — PROGRESS NOTES
Cardiology Progress Note               Author: Sandie To Date & Time created: 12/9/2017  9:02 AM     Interval History:  No acute events overnight.  No telemetry events.    Review of Systems   Constitutional: Negative for chills, fever, malaise/fatigue and weight loss.   HENT: Negative for ear discharge, ear pain, hearing loss and nosebleeds.    Eyes: Negative for blurred vision, double vision, pain and discharge.   Respiratory: Negative for cough and shortness of breath.    Cardiovascular: Negative for chest pain, palpitations, orthopnea, claudication, leg swelling and PND.   Gastrointestinal: Negative for abdominal pain, blood in stool, melena, nausea and vomiting.   Genitourinary: Negative for dysuria and hematuria.   Musculoskeletal: Negative for falls, joint pain and myalgias.   Skin: Negative for itching and rash.   Neurological: Negative for dizziness, sensory change, speech change, loss of consciousness and headaches.   Endo/Heme/Allergies: Negative for environmental allergies. Does not bruise/bleed easily.   Psychiatric/Behavioral: Negative for depression, hallucinations and suicidal ideas.       Physical Exam   Constitutional: He is oriented to person, place, and time. He appears well-developed and well-nourished.   HENT:   Head: Normocephalic and atraumatic.   Eyes: EOM are normal.   Neck: Normal range of motion. No JVD present.   Cardiovascular: Normal rate, regular rhythm, normal heart sounds and intact distal pulses.  Exam reveals no gallop and no friction rub.    No murmur heard.  Bilateral femoral pulses are 2+, bilateral dorsalis pedis pulses are 2+, bilateral posterior tibialis pulses are 2+.   Pulmonary/Chest: No respiratory distress. He has no wheezes. He has no rales. He exhibits no tenderness.   Abdominal: Soft. Bowel sounds are normal. There is no tenderness. There is no rebound and no guarding.   The is no presence of abdominal bruits   Musculoskeletal: Normal range of motion.    Neurological: He is alert and oriented to person, place, and time.   Skin: Skin is warm and dry.   Psychiatric: He has a normal mood and affect.   Nursing note and vitals reviewed.      Hemodynamics:  Temp (24hrs), Av.8 °C (98.2 °F), Min:36.4 °C (97.6 °F), Max:37.2 °C (99 °F)  Temperature: 36.7 °C (98 °F)  Pulse  Av.6  Min: 67  Max: 82Heart Rate (Monitored): 76  Blood Pressure: 122/70, NIBP: 130/75     Respiratory:    Respiration: 18, Pulse Oximetry: 91 %           Fluids:     Weight: 116.8 kg (257 lb 8 oz)  GI/Nutrition:  Orders Placed This Encounter   Procedures   • DIET NPO     Standing Status:   Standing     Number of Occurrences:   1     Order Specific Question:   Restrict to:     Answer:   Sips with Medications [3]     Lab Results:  Recent Labs      17   1448  17   0415   WBC  11.0*  7.7   RBC  4.88  4.64*   HEMOGLOBIN  14.4  13.8*   HEMATOCRIT  42.5  41.1*   MCV  87.1  88.6   MCH  29.5  29.7   MCHC  33.9  33.6*   RDW  41.6  42.5   PLATELETCT  304  269   MPV  9.4  9.6     Recent Labs      17   1448  17   0415   SODIUM  136  136   POTASSIUM  3.9  4.2   CHLORIDE  102  102   CO2  23  24   GLUCOSE  157*  206*   BUN  14  15   CREATININE  0.66  0.67   CALCIUM  9.4  9.3     Recent Labs      17   1448  172  17   0415   APTT  36.1*   --   49.4*   INR  1.00  1.10   --      Recent Labs      17   1448   BNPBTYPENAT  42     Recent Labs      17   1448  17   0415   TROPONINI  4.72*  2.79*  3.66*   BNPBTYPENAT  42   --    --      Recent Labs      17   0415   TRIGLYCERIDE  495*   HDL  28*   LDL  see below         Medical Decision Making, by Problem:  Active Hospital Problems    Diagnosis   • *NSTEMI (non-ST elevated myocardial infarction) (CMS-HCC) [I21.4]   • Type 2 diabetes mellitus (CMS-HCC) [E11.9]   • Dyslipidemia [E78.5]   • Essential hypertension [I10]   • Migraine [G43.909]       Plan:    Cath today.  Continue asa, BB,  ACE-I atorvastatin at current dose.    Thank you for referring this patient to our cardiology service.  We will follow patient with you.    Sandie Bobo MD.  [facility].      Reviewed items::  EKG reviewed, Radiology images reviewed, Labs reviewed and Medications reviewed

## 2017-12-09 NOTE — CONSULTS
Cardiology Consult Note:    Sandie To  Date & Time note created:    12/8/2017   4:06 PM     Referring MD:  Dr. Dial    Patient ID:   Name:             Benoit Barkley     YOB: 1965  Age:                 52 y.o.  male   MRN:               [id number]                                                             Chief Complaint / Reason for consult:  Chest pain and elevated troponin    History of Present Illness:    This is a 52-year-old man with prior history of diabetes, hypertension and hyperlipidemia, presented to the hospital because of migraines and chest pain. Since Sunday, patient has had significant migraine with associated chest tightness. The chest tightness has been intermittent. Lasting for minutes at a time. He hasn't been feeling well at all since Sunday. In the hospital today, his troponin is elevated at 4. EKG does not show evidence of acute coronary syndrome sinus rhythm but there might be Q wave inferiorly.    Patient is not having any active chest pain at this time. His headache is getting better.    Review of Systems:      Constitutional: Denies fevers, Denies weight changes  Eyes: Denies changes in vision, no eye pain  Ears/Nose/Throat/Mouth: Denies nasal congestion or sore throat   Cardiovascular: no chest pain, no palpitations   Respiratory: no shortness of breath , Denies cough  Gastrointestinal/Hepatic: Denies abdominal pain, nausea, vomiting, diarrhea, constipation or GI bleeding   Genitourinary: Denies dysuria or frequency  Musculoskeletal/Rheum: Denies  joint pain and swelling   Skin: Denies rash  Neurological: Denies headache, confusion, memory loss or focal weakness/parasthesias  Psychiatric: denies mood disorder   Endocrine: Daria thyroid problems  Heme/Oncology/Lymph Nodes: Denies enlarged lymph nodes, denies brusing or known bleeding disorder  All other systems were reviewed and are negative (AMA/CMS criteria)                Past Medical History:   Past Medical  "History:   Diagnosis Date   • Dyslipidemia 12/8/2017   • NSTEMI (non-ST elevated myocardial infarction) (CMS-HCC) 12/8/2017     Active Hospital Problems    Diagnosis   • NSTEMI (non-ST elevated myocardial infarction) (CMS-HCC) [I21.4]   • Dyslipidemia [E78.5]       Past Surgical History:  History reviewed. No pertinent surgical history.    Hospital Medications:    Current Facility-Administered Medications:   •  atorvastatin (LIPITOR) tablet 80 mg, 80 mg, Oral, QHS, Sandie Bobo M.D.  •  metoprolol (LOPRESSOR) tablet 25 mg, 25 mg, Oral, TWICE DAILY, Sandie Bobo M.D.  •  aspirin (ASA) tablet 325 mg, 325 mg, Oral, DAILY, Sandie Bobo M.D.  •  MD ANDREW...HEPARIN WEIGHT BASED PROTOCOL Pharmacist to implement, , Other, PRN, Sandie Bobo M.D.  No current outpatient prescriptions on file.    Current Outpatient Medications:    (Not in a hospital admission)    Medication Allergy:  No Known Allergies    Family History:  History reviewed. No pertinent family history.    Social History:  Social History     Social History   • Marital status:      Spouse name: N/A   • Number of children: N/A   • Years of education: N/A     Occupational History   • Not on file.     Social History Main Topics   • Smoking status: Not on file   • Smokeless tobacco: Not on file   • Alcohol use Not on file   • Drug use: Unknown   • Sexual activity: Not on file     Other Topics Concern   • Not on file     Social History Narrative   • No narrative on file         Physical Exam:  Vitals/ General Appearance:   Weight/BMI: Body mass index is 31.28 kg/m².  Blood pressure 126/64, pulse 77, temperature 37.2 °C (99 °F), resp. rate 18, height 1.93 m (6' 4\"), weight 116.6 kg (257 lb), SpO2 92 %.  Vitals:    12/08/17 1439 12/08/17 1500 12/08/17 1530 12/08/17 1600   BP: 126/64      Pulse: 79 82 79 77   Resp: 18      Temp: 37.2 °C (99 °F)      SpO2:  91% 93% 92%   Weight:       Height:         Oxygen Therapy:  Pulse Oximetry: 92 %, O2 " Delivery: None (Room Air)    Constitutional:   Well developed, Well nourished, No acute distress  HENMT:  Normocephalic, Atraumatic, Oropharynx moist mucous membranes, No oral exudates, Nose normal.  No thyromegaly.  Eyes:  EOMI, Conjunctiva normal, No discharge.  Neck:  Normal range of motion, No cervical tenderness,  no JVD.  Cardiovascular:  Normal heart rate, Normal rhythm, No murmurs, No rubs, No gallops.   Extremitites with intact distal pulses, no cyanosis, or edema.  Lungs:  Normal breath sounds, breath sounds clear to auscultation bilaterally,  no rales, no rhonchi, no wheezing.   Abdomen: Bowel sounds normal, Soft, No tenderness, No guarding, No rebound, No masses, No hepatosplenomegaly.  Skin: Warm, Dry, No erythema, No rash, no induration.  Neurologic: Alert & oriented x 3, No focal deficits noted, cranial nerves II through X are intact.  Psychiatric: Affect normal, Judgment normal, Mood normal.      MDM (Data Review):     Records reviewed and summarized in current documentation    Lab Data Review:  Recent Results (from the past 24 hour(s))   EKG (NOW)    Collection Time: 17  2:33 PM   Result Value Ref Range    Report       [facility] Emergency Dept.    Test Date:  2017  Pt Name:    CAMPBELL ROBLES                   Department: ER  MRN:        [id number]                      Room:        19  Gender:     M                            Technician: [id number]  :        1965                   Requested By:ER TRIAGE PROTOCOL  Order #:    [id number]                    Reading MD:    Measurements  Intervals                                Axis  Rate:       77                           P:          49  LA:         156                          QRS:        11  QRSD:       102                          T:          -4  QT:         380  QTc:        431    Interpretive Statements  SINUS RHYTHM  PROBABLE INFERIOR INFARCT, AGE INDETERMINATE  No previous ECG available for comparison     Troponin     Collection Time: 12/08/17  2:48 PM   Result Value Ref Range    Troponin I 4.72 (H) 0.00 - 0.04 ng/mL   CBC with Differential    Collection Time: 12/08/17  2:48 PM   Result Value Ref Range    WBC 11.0 (H) 4.8 - 10.8 K/uL    RBC 4.88 4.70 - 6.10 M/uL    Hemoglobin 14.4 14.0 - 18.0 g/dL    Hematocrit 42.5 42.0 - 52.0 %    MCV 87.1 81.4 - 97.8 fL    MCH 29.5 27.0 - 33.0 pg    MCHC 33.9 33.7 - 35.3 g/dL    RDW 41.6 35.9 - 50.0 fL    Platelet Count 304 164 - 446 K/uL    MPV 9.4 9.0 - 12.9 fL    Neutrophils-Polys 62.30 44.00 - 72.00 %    Lymphocytes 28.20 22.00 - 41.00 %    Monocytes 7.70 0.00 - 13.40 %    Eosinophils 1.00 0.00 - 6.90 %    Basophils 0.50 0.00 - 1.80 %    Immature Granulocytes 0.30 0.00 - 0.90 %    Nucleated RBC 0.00 /100 WBC    Neutrophils (Absolute) 6.86 1.82 - 7.42 K/uL    Lymphs (Absolute) 3.11 1.00 - 4.80 K/uL    Monos (Absolute) 0.85 0.00 - 0.85 K/uL    Eos (Absolute) 0.11 0.00 - 0.51 K/uL    Baso (Absolute) 0.06 0.00 - 0.12 K/uL    Immature Granulocytes (abs) 0.03 0.00 - 0.11 K/uL    NRBC (Absolute) 0.00 K/uL   Complete Metabolic Panel (CMP)    Collection Time: 12/08/17  2:48 PM   Result Value Ref Range    Sodium 136 135 - 145 mmol/L    Potassium 3.9 3.6 - 5.5 mmol/L    Chloride 102 96 - 112 mmol/L    Co2 23 20 - 33 mmol/L    Anion Gap 11.0 0.0 - 11.9    Glucose 157 (H) 65 - 99 mg/dL    Bun 14 8 - 22 mg/dL    Creatinine 0.66 0.50 - 1.40 mg/dL    Calcium 9.4 8.5 - 10.5 mg/dL    AST(SGOT) 25 12 - 45 U/L    ALT(SGPT) 23 2 - 50 U/L    Alkaline Phosphatase 81 30 - 99 U/L    Total Bilirubin 0.6 0.1 - 1.5 mg/dL    Albumin 4.3 3.2 - 4.9 g/dL    Total Protein 6.8 6.0 - 8.2 g/dL    Globulin 2.5 1.9 - 3.5 g/dL    A-G Ratio 1.7 g/dL   Prothrombin Time    Collection Time: 12/08/17  2:48 PM   Result Value Ref Range    PT 12.9 12.0 - 14.6 sec    INR 1.00 0.87 - 1.13   APTT    Collection Time: 12/08/17  2:48 PM   Result Value Ref Range    APTT 36.1 (H) 24.7 - 36.0 sec   Lipase    Collection Time: 12/08/17  2:48  PM   Result Value Ref Range    Lipase 26 11 - 82 U/L   ESTIMATED GFR    Collection Time: 12/08/17  2:48 PM   Result Value Ref Range    GFR If African American >60 >60 mL/min/1.73 m 2    GFR If Non African American >60 >60 mL/min/1.73 m 2       Imaging/Procedures Review:    Chest Xray:  Reviewed    EKG:   As in HPI.     MDM (Assessment and Plan):     Active Hospital Problems    Diagnosis   • NSTEMI (non-ST elevated myocardial infarction) (CMS-HCC) [I21.4]   • Dyslipidemia [E78.5]       At this time, patient certainly has some degree of injury to myocardium. Based on his risk factors, we cannot rule out an acute coronary syndrome presentation. Therefore, we will treat him for presumed acute coronary syndrome. Patient will get CT scan of the head to make sure he doesn't have any intracranial bleed. If that is negative, we will start heparin drip. He has been getting Lovenox. Therefore there is no need for loading dose.    We'll start him on metoprolol 25 mg by mouth twice a day and atorvastatin 80 minute by mouth once a day.    We will obtain a transthoracic echocardiogram to assess cardiac function and valvular function.    Potential cardiac catheterization tomorrow.    Thank you for referring this patient to our cardiology service.  We will follow patient with you.    Sandie Bobo MD.  [facility].

## 2017-12-09 NOTE — ED NOTES
Med rec complete per pt and [facility]  Allergies reviewed - NKDA  No ABX in last month

## 2017-12-09 NOTE — PROGRESS NOTES
Hazel from Lab called with critical result of 3.66 Critical lab result read back to Hazel.    Not notified  Due to patient having procedure today for the  Elevated troponins

## 2017-12-09 NOTE — PROGRESS NOTES
Patient arrived to floor from ED, report received, monitor on, verified with monitors SR 74, no complaints at this time

## 2017-12-09 NOTE — CATH LAB
Immediate Post-Operative Note      PreOp Diagnosis: non STEMI    PostOp Diagnosis: same    Procedure(s) :  Coronary Angiography, Left Heart Catheterization, Left Ventriculography.  2.5 x 18 mm Xience stent Mid Circ    Surgeon(s):  Luisito Mukherjee M.D.    Type of Anesthesia: Moderate Sedation    Specimen: None    Estimated Blood Loss: 15 cc's    Contrast Media:  204 cc's    Fluoro Time: 10.4 min    Xray DAP: 27912    Findings: 100% occlusion of proximal RCA qwith good L to R collateral filling.  99% mid Circ with MARCUS I flow beyond stented with  good result.    Complications: none      Luisito Mukherjee M.D.  12/9/2017 10:34 AM

## 2017-12-09 NOTE — SENIOR ADMIT NOTE
SENIOR ADMIT NOTE      CC:  Migraine headaches, elevated troponin    HPI:   52 year old male with a PMH of HTN, Type 2 DM, DLD, migraines who [facility]    Patient has a hx of occipital headaches of 5 days duration which worsened over the days radiating to the back of his neck and both arms and his shoulders and the upper part of his chest.Pain is  about 4/10, not constant Not associated with  SOB or palpitaions. Patient has a hx of migraines and he states this episode of headaches was different from the usual headaches. [facility] where [facility] was found to have an elevated troponin of 1.72.  In [facility] ED, troponin was 4.72 and EKG showed CT head showed no acute abnormality  Cardiology was consulted. Diagnosis of NSTEMI was made and patient for possible cath tomorrow    ROS: positive for chest pain, malaise, headaches     PHYSICAL EXAM    Constitutional: no fever, chills, weight loss  HEENT: Normocephalic, atraumatic  Eyes: no conjuctival pallor , no scleral icterus  Throat: neck is supple, no tyhromegaly  CVS: no mummur rubs or gallop  PULM CTAB, no wheeze, no rales  GI: no abdominal distention, tenderness, BS present  NEURO: Alert and Oriented,       Assessment/Plan    1. NSTEMI  [facility] and [facility] at [facility].  Atypical presentation for chest pain. It started with a headache which then radiated down to both arm and then upper chest . No associated palpitations or SOB, diaphoresis.  EKG done showed ?q wave ? Infarct.    PLAN    Admit to tele.  Trend trops and repeat EKG.  Cardiology on consult:  TTE pending.  Heparin Drip, Metoprolol 25mg BID, ASA 325mg daily, statins lipitor 80mg  NPO at midnight for possible Cath tomorrow.      2. Migraine headaches.: resume meds  3. HTN: BP stable. resume home meds  4.Type 2 DM :On metformin at home . Will hold. ISS, hypoglycemic protocol. Hgb A1c  5.DLD : On 10 mg lipitor at home .  Lipid profile

## 2017-12-09 NOTE — PROGRESS NOTES
Patient stated mild migraine, denied any non medication alternatives. Patient denies any needs at this time. Patient  Doing self CHG bath for procedure. Patient instructed to call for assistance. Call light within reach.  Will report off to oncoming shift

## 2017-12-09 NOTE — CARE PLAN
Problem: Safety  Goal: Will remain free from injury  Outcome: PROGRESSING AS EXPECTED  Rn educated  Patient and family On safety and fall precautions. Patient and family verbalized understanding of education.     Problem: Infection  Goal: Will remain free from infection  Outcome: PROGRESSING AS EXPECTED  Rn educated patient and family on infection control and precautions.  Patient and family verbalized understanding of education

## 2017-12-09 NOTE — ASSESSMENT & PLAN NOTE
- Atypical migraine type headache, but pain unusually radiated down his neck, B/L shoulders and all across his anterior chest above nipple line  - [facility] --> 1.7  - Trop trended up to 4.72 in [facility] ER at 2.30 pm  - EKG : probable inferior infarct, Q wave leads II, III  - cardiology on board  - Cardiac cath 12/9/17 : angioplasty and stent to 99% stenotic lesion in LCx  - Echo : 65% EF  Plan  - appreciate cardiology recs  - aspirin changed to 81 mg   - prasugrel 10 mg  - Atorvastatin 80 mg  - continue Lisinopril & Metoprolol  - home

## 2017-12-09 NOTE — PROCEDURES
DATE OF SERVICE:  12/09/2017    PROCEDURES:  1.  Selective coronary angiography.  2.  Left heart catheterization.  3.  Left ventriculography.  4.  Placement of a 2.5x18 mm Xience Alpine stent to the mid circumflex.    INDICATIONS:  The patient is a 52-year-old diabetic admitted to the hospital   with chest pain and evidence of a non-STEMI myocardial infarction by enzymes.    He is undergoing angiography at this time to determine the extent of his   coronary artery disease and need for possible intervention.    DESCRIPTION OF PROCEDURE:  The right wrist was sterilely prepped and draped in   usual fashion.  The patient was premedicated with 1.5 mg of Versed and 50 mcg   of fentanyl.  The area of the right radial artery was anesthetized with 2%   lidocaine and the artery was easily entered.  A 6-Mauritanian sheath was placed.    He was given intra-arterial verapamil and nitroglycerin.  An ACT was drawn as   he was on a heparin infusion.  This was followed by giving 3500 units of   intravenous heparin.    A 5-Mauritanian Chino catheter was placed and advanced into the ostium of the right   coronary artery where single angiogram revealed the artery was occluded   within a centimeter of its origin.  The Chino catheter would not seat into the   left main and was exchanged over a wire for a 3.5 left Silas catheter.    This was advanced into the ostium of the left main coronary artery where   selective angiograms were performed.  Following this, a pigtail catheter was   exchanged and a left heart catheterization was performed and this was followed   by left ventriculogram using 30 mL of contrast.  Left heart pullback was   performed.    Coronary intervention was performed.  An ACT was again checked and the patient   was given an additional 2500 units of heparin.  A left Silas guide was   placed and a BMW wire was placed across the lesion.  The lesion was then   angioplastied with a 2.5 mm balloon and then stented with a 2.5x18 mm  Xience   Alpine stent, which was deployed initially at 12 atmospheres and then   postdilated with a 2.5 noncompliant balloon at 14 atmospheres distally and 15   atmospheres proximally.  Final angiograms revealed good result with MARCUS 3   flow to the terminus of the circumflex.  The guiding catheter and guidewire   were removed.  Additional nitroglycerin was given through the arterial sheath   and the sheath was removed.  Hemostasis was obtained by direct compression of   the artery using Hemoband.    COMPLICATIONS:  There were no complications.    ESTIMATED BLOOD LOSS:  15 mL    FLUOROSCOPY TIME:  10.2 minutes.    X-RAY DOSE-AREA PRODUCT:  26,570.    TOTAL CONTRAST MEDIA:  204 mL of Omnipaque 350.    HEMODYNAMICS:  Reveal sinus rhythm throughout the procedure.  Aortic pressure   106/66 mmHg.  LV pressure was 126/20 mmHg.  LV pullback do not demonstrate a   gradient.    Fluoroscopy of the heart is unremarkable.    The left ventriculogram demonstrates hypokinesis of the mid inferior wall.    The remainder of the contractility is unremarkable.  Estimated ejection   fraction is 55%.    The right coronary artery is totally occluded within a centimeter of its   origin.  The PDA and posterior left ventricular branches of the right coronary   artery fill well via left-sided collaterals.  The left main is free of   disease and bifurcates into the LAD and circumflex.  The circumflex gives rise   to a first marginal artery of moderate to large sized that is completely   occluded and fills late by collaterals.  Beyond this arises a left atrial   branch.  Beyond this point arises a large caliber and unremarkable second   marginal artery.  Beyond the origin of the second marginal artery, the   circumflex has a 99% stenosis and there is MARCUS 1 flow into the distal   circumflex and terminal large marginal artery.    Post-coronary intervention, there is a full deployed stent in the distal   circumflex with no filling defects and  MARCUS 3 flow to the terminus of the   terminal marginal artery.  Again, the first marginal artery is completely   occluded in its origin chronically and fills faintly via left-sided   collaterals.    The LAD terminates at the LV apex and gives rise to a large caliber first   diagonal artery and a smaller second diagonal artery.  There is minor plaquing   in the proximal LAD proximal to the origin of the first diagonal artery.  At   the level of the first septal , there is an eccentric stenosis of   30%.  The proximal portion of the second diagonal artery has a 20-30% stenosis   in its proximal segment.    IMPRESSION:  1.  Successful stenting of subtotal stenosis (99%) of the mid circumflex   coronary artery with a 2.5x18 mm Xience Alpine stent.  2.  Chronic total occlusion of the first marginal artery with faint collateral   filling.  3.  Chronic total occlusion of the proximal right coronary artery with good   collateral filling of the PDA and posterior left ventricular branches via   left-sided collaterals.  4.  A 30% proximal left anterior descending lesion.  5.  A 25-30% lesion in the proximal segment of the second diagonal artery.  6.  Left ventricular dysfunction with severe hypokinesis in the mid inferior   wall.  7.  Elevated left ventricular end-diastolic pressure.       ____________________________________     MD GOLDEN KHAN / NAN    DD:  12/09/2017 10:58:39  DT:  12/09/2017 11:40:59    D#:  [id number]  Job#:  [id number]

## 2017-12-09 NOTE — ASSESSMENT & PLAN NOTE
- on Lisinopril 10 mg at home  - patient had concerns about beta-blocker in view of side-effect form betablocker for migraine in the past --> informed that he might have used Propranolol and not metoprolol (as it was for migraine in the past), and that he;s now on a low dose metoprolol and it's beneficial in view of his current cardiac condition  Plan  - continue Lisinopril  - Beta-blocker continue  - PRN labetolol

## 2017-12-09 NOTE — PROGRESS NOTES
PTT 49.4 , per protocol  Heparin drip  Has been increased by 300 units, went from 1450 to 1750 and received a bolus of 3800 units. Patient denies any needs at this time. Call light within reach. Patient instructed to call for assistance.

## 2017-12-09 NOTE — PROGRESS NOTES
Marlin from Lab called with critical result of 2.79 at 2318. Critical lab result read back to Marlin Dubon  Not notified of critical lab. Labs are trending down and patient has procedure in morning.

## 2017-12-09 NOTE — ASSESSMENT & PLAN NOTE
- known type II diabetes  - on Metformin 1000 mg BID   - was started on a new anti-diabetic medication 12 days ago to reduce HbA1c  - HbA1c 8.5  Plan  - resume home meds tomorrow

## 2017-12-09 NOTE — H&P
Internal Medicine Admitting History and Physical    Note Author: Susan Martinez M.D.       Name Benoit Barkley 1965   Age/Sex 52 y.o. male   MRN [id number]   Code Status FULL CODE     After 5PM or if no immediate response to page, please call for cross-coverage  Attending/Team: Dr. Foy / [place] See Patient List for primary contact information  Call [phone] to page    1st Call - Day Intern (R1):   Dr. Martinez 2nd Call - Day Sr. Resident (R2/R3):   Dr. Wright       Chief Complaint:    Headache - Migraine   Elevated Troponin    HPI:    This 52 year old gentleman with a PMHx of HTN, type II DM, DLD, migraines, numerous surgeries on B/L hips and knees, s/p left hip replacement, s/p skull fracture repair / intra-cranial surgery in late  after an accident (in [place]), plates and screws in the spine, [facility] due to an elevated Troponin of 1.72, which tren[facility] further to 4.72 [facility].    Patient c/o 5 day h/o headache which gradually worsened to mimic his migraine headache. He has migraine x 2-3 / month, and headache almost every day. 5 days ago, the headache he developed was unusual in that it radiated to his neck, B/L shoulders and all across his upper chest. Intermittent pain, 4-6/10 in intensity, no association with respiration. He worked out in the gym for 30 mins each on Monday and Friday - noticed that pain worsened after activity on Wednesday, but did not have any issues on Monday. Denied SOB, although his wife mentioned that he had labored breathing on occasions past few days. Denies palpitations, nausea, vomiting, diaphoresis, change in bowel or urinary habit, cough, fever, chills, rigors. In view of the atypical nature of his headache, his wife urged him to seek medical help, hence [facility]. The pain was relieved after he was [facility]audid [facility] this [facility]. He hasn't been sleeping well past few days, and had reduced appetite.      He was recently commenced on a new anti-diabetic medication 10-12 days ago.   He is otherwise very active, retired, is physically active, plays sport.    Review of Systems   Constitutional: Positive for malaise/fatigue. Negative for chills, diaphoresis, fever and weight loss.   HENT: Negative for ear discharge, ear pain, hearing loss and tinnitus.    Eyes: Negative for blurred vision, double vision and photophobia.   Respiratory: Positive for shortness of breath. Negative for cough, sputum production and wheezing.    Cardiovascular: Positive for chest pain. Negative for palpitations, orthopnea, claudication and leg swelling.   Gastrointestinal: Negative for abdominal pain, constipation, diarrhea, heartburn, nausea and vomiting.   Genitourinary: Negative for dysuria, frequency and urgency.   Musculoskeletal: Negative for back pain and neck pain.   Skin: Negative for itching and rash.   Neurological: Positive for headaches. Negative for dizziness, tingling, tremors, sensory change, speech change, focal weakness and seizures.   Endo/Heme/Allergies: Negative for polydipsia. Does not bruise/bleed easily.   Psychiatric/Behavioral: Negative for depression, substance abuse and suicidal ideas.       PMH  [facility]  HTN,   DLD,   DM type II  Recurrent Migraine  Asthma occasional    PSH :  Numerous surgeries on B/L hips x 4 and knees x 4  Had spinal surgery, brain surgery following accidnet in late 1980s in [place]          Past Medical History:   Past Medical History:   Diagnosis Date   • Dyslipidemia 12/8/2017   • NSTEMI (non-ST elevated myocardial infarction) (CMS-[facility]) 12/8/2017       Past Surgical History:  History reviewed. No pertinent surgical history.    Current Outpatient Medications:  Home Medications     Reviewed by Trav Wilson (Pharmacy Tech) on 12/08/17 at 1610  Med List Status: Complete   Medication Last Dose Status   albuterol 108 (90 Base) MCG/ACT Aero Soln inhalation aerosol 12/4/2017 Active  "  aspirin EC (ECOTRIN) 81 MG Tablet Delayed Response 12/8/2017 Active   atorvastatin (LIPITOR) 10 MG Tab 12/8/2017 Active   lisinopril (PRINIVIL) 10 MG Tab 12/7/2017 Active   metformin (GLUCOPHAGE) 1000 MG tablet 12/8/2017 Active   multivitamin (THERAGRAN) Tab 12/8/2017 Active   Omega-3 Fatty Acids (FISH OIL) 1000 MG Cap capsule 12/8/2017 Active                Medication Allergy/Sensitivities:  No Known Allergies      Family History:  History reviewed. No pertinent family history.   Extensive family h/o heart disease on both maternal and paternal side of his family.      Social History:  Social History     Social History   • Marital status:      Spouse name: N/A   • Number of children: N/A   • Years of education: N/A     Occupational History   • Not on file.     Social History Main Topics   • Smoking status: Not on file   • Smokeless tobacco: Not on file   • Alcohol use Not on file   • Drug use: Unknown   • Sexual activity: Not on file     Other Topics Concern   • Not on file     Social History Narrative   • No narrative on file       Social Hx :  Smoking : Quit > 30 years ago, smoked for 1 year when in [employer]  EtOH : very socially, 1-2 beers / month  Illicit Drug Use : used many in the past, nil for past 20 years      Living situation: Lives with wife  PCP : None      Physical Exam     Vitals:    12/08/17 1530 12/08/17 1600 12/08/17 1651 12/08/17 1700   BP:       Pulse: 79 77 81 81   Resp:       Temp:       SpO2: 93% 92% 94% 96%   Weight:       Height:         Body mass index is 31.28 kg/m².  /64   Pulse 81   Temp 37.2 °C (99 °F)   Resp 18   Ht 1.93 m (6' 4\")   Wt 116.6 kg (257 lb)   SpO2 96%   BMI 31.28 kg/m²   O2 therapy: Pulse Oximetry: 96 %, O2 Delivery: None (Room Air)    Physical Exam   Constitutional: He is oriented to person, place, and time and well-developed, well-nourished, and in no distress. No distress.   HENT:   Head: Normocephalic and atraumatic.   Mouth/Throat: Oropharynx is clear " and moist. No oropharyngeal exudate.   Eyes: Conjunctivae are normal. Pupils are equal, round, and reactive to light. Right eye exhibits no discharge. Left eye exhibits no discharge. No scleral icterus.   Neck: Normal range of motion. No JVD present. No tracheal deviation present.   Cardiovascular: Normal rate, regular rhythm and normal heart sounds.  Exam reveals no gallop and no friction rub.    No murmur heard.  Pulmonary/Chest: Effort normal and breath sounds normal. No stridor. No respiratory distress. He has no wheezes. He has no rales. He exhibits no tenderness.   Abdominal: Soft. Bowel sounds are normal. He exhibits no distension. There is no tenderness. There is no rebound and no guarding.   Musculoskeletal: He exhibits no edema, tenderness or deformity.   Lymphadenopathy:     He has no cervical adenopathy.   Neurological: He is alert and oriented to person, place, and time. GCS score is 15.   Skin: Skin is warm. No rash noted. He is not diaphoretic. No erythema.   Psychiatric: Mood, memory, affect and judgment normal.             Data Review       Old Records Request:   Deferred  Current Records review and summary: Completed    Lab Data Review:  Recent Results (from the past 24 hour(s))   EKG (NOW)    Collection Time: 17  2:33 PM   Result Value Ref Range    Report       [facility] Emergency Dept.    Test Date:  2017  Pt Name:    CAMPBELL ROBLES                   Department: ER  MRN:        [id number]                      Room:        19  Gender:     M                            Technician: [id number]  :        1965                   Requested By:ER TRIAGE PROTOCOL  Order #:    [id number]                    Reading MD:    Measurements  Intervals                                Axis  Rate:       77                           P:          49  FL:         156                          QRS:        11  QRSD:       102                          T:          -4  QT:         380  QTc:         431    Interpretive Statements  SINUS RHYTHM  PROBABLE INFERIOR INFARCT, AGE INDETERMINATE  No previous ECG available for comparison     Troponin    Collection Time: 12/08/17  2:48 PM   Result Value Ref Range    Troponin I 4.72 (H) 0.00 - 0.04 ng/mL   Btype Natriuretic Peptide    Collection Time: 12/08/17  2:48 PM   Result Value Ref Range    B Natriuretic Peptide 42 0 - 100 pg/mL   CBC with Differential    Collection Time: 12/08/17  2:48 PM   Result Value Ref Range    WBC 11.0 (H) 4.8 - 10.8 K/uL    RBC 4.88 4.70 - 6.10 M/uL    Hemoglobin 14.4 14.0 - 18.0 g/dL    Hematocrit 42.5 42.0 - 52.0 %    MCV 87.1 81.4 - 97.8 fL    MCH 29.5 27.0 - 33.0 pg    MCHC 33.9 33.7 - 35.3 g/dL    RDW 41.6 35.9 - 50.0 fL    Platelet Count 304 164 - 446 K/uL    MPV 9.4 9.0 - 12.9 fL    Neutrophils-Polys 62.30 44.00 - 72.00 %    Lymphocytes 28.20 22.00 - 41.00 %    Monocytes 7.70 0.00 - 13.40 %    Eosinophils 1.00 0.00 - 6.90 %    Basophils 0.50 0.00 - 1.80 %    Immature Granulocytes 0.30 0.00 - 0.90 %    Nucleated RBC 0.00 /100 WBC    Neutrophils (Absolute) 6.86 1.82 - 7.42 K/uL    Lymphs (Absolute) 3.11 1.00 - 4.80 K/uL    Monos (Absolute) 0.85 0.00 - 0.85 K/uL    Eos (Absolute) 0.11 0.00 - 0.51 K/uL    Baso (Absolute) 0.06 0.00 - 0.12 K/uL    Immature Granulocytes (abs) 0.03 0.00 - 0.11 K/uL    NRBC (Absolute) 0.00 K/uL   Complete Metabolic Panel (CMP)    Collection Time: 12/08/17  2:48 PM   Result Value Ref Range    Sodium 136 135 - 145 mmol/L    Potassium 3.9 3.6 - 5.5 mmol/L    Chloride 102 96 - 112 mmol/L    Co2 23 20 - 33 mmol/L    Anion Gap 11.0 0.0 - 11.9    Glucose 157 (H) 65 - 99 mg/dL    Bun 14 8 - 22 mg/dL    Creatinine 0.66 0.50 - 1.40 mg/dL    Calcium 9.4 8.5 - 10.5 mg/dL    AST(SGOT) 25 12 - 45 U/L    ALT(SGPT) 23 2 - 50 U/L    Alkaline Phosphatase 81 30 - 99 U/L    Total Bilirubin 0.6 0.1 - 1.5 mg/dL    Albumin 4.3 3.2 - 4.9 g/dL    Total Protein 6.8 6.0 - 8.2 g/dL    Globulin 2.5 1.9 - 3.5 g/dL    A-G Ratio 1.7 g/dL    Prothrombin Time    Collection Time: 12/08/17  2:48 PM   Result Value Ref Range    PT 12.9 12.0 - 14.6 sec    INR 1.00 0.87 - 1.13   APTT    Collection Time: 12/08/17  2:48 PM   Result Value Ref Range    APTT 36.1 (H) 24.7 - 36.0 sec   Lipase    Collection Time: 12/08/17  2:48 PM   Result Value Ref Range    Lipase 26 11 - 82 U/L   ESTIMATED GFR    Collection Time: 12/08/17  2:48 PM   Result Value Ref Range    GFR If African American >60 >60 mL/min/1.73 m 2    GFR If Non African American >60 >60 mL/min/1.73 m 2       Imaging/Procedures Review:    ndependant Imaging Review: Completed     CT-HEAD W/O   Final Result      No evidence of acute intracranial process.      OUTSIDE IMAGES-DX CHEST   Final Result      ECHOCARDIOGRAM COMP W/O CONT    (Results Pending)   DX-CHEST-2 VIEWS    (Results Pending)   EKG:   EKG Independant Review: Completed  QTc:431, HR: 76, Normal Sinus Rhythm, probable inferior infarct  ? Q wave in II, III    () Records reviewed and summarized in current documentation             Assessment/Plan     * NSTEMI (non-ST elevated myocardial infarction) (CMS-HCC)   Assessment & Plan    - Atypical migraine type headache, but pain unusually radiated down his neck, B/L shoulders and all across his anterior chest above nipple line  - [facility] 1.7  - Trop trended up to 4.72 in [facility] ER at 2.30 pm  - EKG : probable inferior infarct, Q wave leads II, III  - reviewed by cardiology  Plan  - appreciate cardiology recs  - was given 325 mg PO aspirin in ER  - Aspirin 325 mg daily -- prescribed by Dr. Bobo  - Atorvastatin increased to 80 mg  - Metoprolol started by cardiology  - continue home Lisinopril  - Echo  - weight based heparin infusion  - For cardiac cath tomorrow am  - NPO from midnight  - Repeat Trop at 7pm tonight and 3 am tomorrow  - Repeat EKG at 7 pm tonight and 3 am tomorrow  - Magnesium  - telemonitoring  - TSH tomorrow        Type 2 diabetes mellitus (CMS-HCC)   Assessment & Plan    - known  type II diabetes  - on Metformin 1000 mg BID   - was started on a new anti-diabetic medication 12 days ago to reduce HbA1c  Plan  - ISS  - Hypoglycemia protocol  - NPO from midnight  - HbA1c tomorrow          Essential hypertension   Assessment & Plan    - on Lisinopril 10 mg at home  Plan  - continue Lisinopril  - Beta-blocker commenced  - PRN labetolol        Dyslipidemia   Assessment & Plan    - usually on 10 mg atorvastatin  Plan  - Atorvastatin increased to 80 mg        Migraine   Assessment & Plan    - has 2-3 episodes of migraine headaches / month  - headache almost daily  - current migraine/hedache different to usual --> radiated to neck, B/L shoulders and chest  - as above for NSTEMI  - ctm for migraine  - PRN analgesics            Anticipated Hospital stay:  >2 midnights    CODE STATUS : FULL CODE      Quality Measures    Reviewed items::  EKG reviewed, Medications reviewed, Labs reviewed and Radiology images reviewed  Hogan catheter::  No Hogan  DVT prophylaxis pharmacological::  Heparin  Ulcer Prophylaxis::  No      Susan Martinez MD    The patient was seen by me face to face. I personally had a discussion with the patient, wife and multiple friends from [employer] at bedside. The case was discussed with our resident team, I examined the patient and confirmed the essential components of the history, physical examination, diagnosis and treatment plan as needed. I agree with the patient care as documented by the resident and edited as above by me. See resident's note above for complete details of service. The overall treatment regimen will be carried out as described above.     Thank you:  Leandro Foy MD, FACP  [facility]  Pager: Use Tiger Text (use resident info under treatment team for day to day floor issues)  Office: [phone] Ext. [phone]  Fax: [phone] (non PHI only)

## 2017-12-09 NOTE — PROGRESS NOTES
Received report from previous nurse regarding prior 12 hours at 0645.  Pt in no distress. POC reviewed with pt, white board updated, pt verbalized understanding, call light within reach. Bed locked in lowest position. Will continue to monitor and follow plan of care.

## 2017-12-09 NOTE — DIETARY
Nutrition Services:   Consult received for diabetic and cardiac diet education. Attempted to visit pt for diet education, pt was not in the room. RD will follow up for diet education.     RD available prn.

## 2017-12-09 NOTE — DISCHARGE SUMMARY
Internal Medicine Discharge Summary  Note Author: Mariya Wright M.D.       Admit Date:  12/8/2017       Discharge Date:   12/10/2017    Service:   [facility] Internal Medicine [facility]  Attending Physician(s):   Dr. Foy    Senior Resident(s):   Dr Wright  Reji Resident(s):   Dr Martinez      Primary Diagnosis:     NSTEMI - coronary stent placed    Secondary Diagnoses:                Principal Problem:    NSTEMI (non-ST elevated myocardial infarction) (CMS-HCC) POA: Unknown  Active Problems:    Type 2 diabetes mellitus (CMS-HCC) POA: Unknown    Dyslipidemia POA: Unknown    Essential hypertension POA: Unknown    Migraine POA: Unknown  Resolved Problems:    * No resolved hospital problems. *      Hospital Summary (Brief Narrative):           This 52 year old gentleman with a PMHx of HTN, type II DM, DLD, migraines, numerous surgeries on B/L hips and knees, s/p left hip replacement, s/p skull fracture repair / intra-cranial surgery in late 1980s after an accident (in [place]), plates and screws in the spine, [facility] ER om 12/8/17 due to an elevated Troponin of 1.72, which trended up further to 4.72 in [facility] ER.     Prior to admission to [facility] on 12/8/17, he had a 5 day h/o headache which gradually worsened to mimic his migraine headache. He has migraine x 2-3 / month, and headache almost every day. 5 days ago, the headache he developed was unusual in that it radiated to his neck, B/L shoulders and all across his upper chest. Intermittent pain, 4-6/10 in intensity, no association with respiration. Denied palpitations, nausea, vomiting, diaphoresis, change in bowel or urinary habit, cough, fever, chills, rigors. In view of the atypical nature of his headache, his wife urged him to seek medical help, hence [facility]. The pain was relieved after he was [facility] this [facility]. His repeat Trop in [facility] ER was 4.72, and EKG showed possible Q waves in inferior  leads.  He was started on aspirin 325mg, metoprolol 25mg BID, lipitor 325mg, and lisinopril 10mg daily.  CT head did not show any intracranial bleed and heparin was commenced for NSTEMI. Serial troponins were elevated. Cardiology was consulted, and under the care of Dr. Bobo, coronary angiography  Was performed on 12/09/2017, angioplasty performed and coronary stent was placed in the mid circumflex artery.     Coronary angiography 12/09/2017 by Dr. Mukherjee :   1.  Successful stenting of subtotal stenosis (99%) of the mid circumflex   coronary artery with a 2.5x18 mm Xience Alpine stent.  2.  Chronic total occlusion of the first marginal artery with faint collateral   filling.  3.  Chronic total occlusion of the proximal right coronary artery with good   collateral filling of the PDA and posterior left ventricular branches via   left-sided collaterals.  4.  A 30% proximal left anterior descending lesion.  5.  A 25-30% lesion in the proximal segment of the second diagonal artery.  6.  Left ventricular dysfunction with severe hypokinesis in the mid inferior   wall.  7.  Elevated left ventricular end-diastolic pressure.    An Echocardiogram from 12/09/2017 is as below :  CONCLUSIONS  No prior study is available for comparison.   Normal left ventricular systolic function.  Left ventricular ejection fraction is visually estimated to be 65%.  No significant valve abnormalities.   Unable to estimate pulmonary artery pressure due to an inadequate tricuspid regurgitant jet.    He did not have any further events while on the floor. His heparin was discontinued, he was started on Prasugrel in addition to his aspirin. He was advised to stay on dual anti-platelet regimen for at least one year. He was discharged on the following cardiac medication.     - Aspirin 81 mg  - Prasugrel 10 mg  - Metoprolol 25 mg BID  - Atorvastatin 80 mg  - Lisinopril 10 mg daily    He had concerns about beta-blockers (he used one in the past for migraines  - likely propranolol, and he had side-effects). Informed patient that he's on a low dose of Metoprolol which has a beneficial effect given his current cardiac condition.     He is normally on Metformin and was recently commenced on a new anti-diabetic medication 10-12 days ago. His HbA1c is elevated at 8.5. He was advised to recommence his diabetic medication from 12/11/2017.      Patient /Hospital Summary (Details -- Problem Oriented) :          Type 2 diabetes mellitus (CMS-HCC)   Assessment & Plan    - known type II diabetes  - on Metformin 1000 mg BID   - was started on a new anti-diabetic medication 12 days ago to reduce HbA1c  - HbA1c 8.5  Plan  - resume home meds tomorrow            * NSTEMI (non-ST elevated myocardial infarction) (CMS-HCC)   Assessment & Plan    - Atypical migraine type headache, but pain unusually radiated down his neck, B/L shoulders and all across his anterior chest above nipple line  - [facility] --> 1.7  - Trop trended up to 4.72 in [facility] ER at 2.30 pm  - EKG : probable inferior infarct, Q wave leads II, III  - cardiology on board  - Cardiac cath 12/9/17 : angioplasty and stent to 99% stenotic lesion in LCx  - Echo : 65% EF  Plan  - appreciate cardiology recs  - aspirin changed to 81 mg   - prasugrel 10 mg  - Atorvastatin 80 mg  - continue Lisinopril & Metoprolol  - home          Essential hypertension   Assessment & Plan    - on Lisinopril 10 mg at home  - patient had concerns about beta-blocker in view of side-effect form betablocker for migraine in the past --> informed that he might have used Propranolol and not metoprolol (as it was for migraine in the past), and that he;s now on a low dose metoprolol and it's beneficial in view of his current cardiac condition  Plan  - continue Lisinopril  - Beta-blocker continue  - PRN labetolol        Dyslipidemia   Assessment & Plan    - usually on 10 mg atorvastatin  Plan  - Atorvastatin increased to 80 mg        Migraine   Assessment &  Plan    - has 2-3 episodes of migraine headaches / month  - headache almost daily  - current migraine/hedache different to usual --> radiated to neck, B/L shoulders and chest  - as above for NSTEMI  - ctm for migraine  - Fioricet            Consultants:     Dr. Bobo - Cardiology    Procedures:        Coronary angiography, angioplasty and coronary stent in mid-circumflex artery  Echo      Imaging/ Testing:      ECHOCARDIOGRAM COMP W/O CONT   Final Result      DX-CHEST-2 VIEWS   Final Result      1.  Hypoinflation with mild LEFT basilar subsegmental atelectasis.   2.  No pneumonia or pneumothorax.      CT-HEAD W/O   Final Result      No evidence of acute intracranial process.      OUTSIDE IMAGES-DX CHEST   Final Result          Discharge Medications:         Medication Reconciliation: Completed       Medication List      START taking these medications      Instructions   acetaminophen/caffeine/butalbital 325-40-50 mg -40 MG Tabs  Commonly known as:  FIORICET   Take 1 Tab by mouth every 6 hours as needed for Headache or Migraine.  Dose:  1 Tab     metoprolol 25 MG Tabs  Commonly known as:  LOPRESSOR   Take 1 Tab by mouth every 12 hours.  Dose:  25 mg     prasugrel 10 MG Tabs  Commonly known as:  EFFIENT   Doctor's comments:  Should continue this at least for 1 year, and then proceed per cardiology recs  Take 1 Tab by mouth every day.  Dose:  10 mg        CHANGE how you take these medications      Instructions   atorvastatin 80 MG tablet  What changed:  · medication strength  · how much to take  · when to take this  Commonly known as:  LIPITOR   Take 1 Tab by mouth every bedtime.  Dose:  80 mg        CONTINUE taking these medications      Instructions   albuterol 108 (90 Base) MCG/ACT Aers inhalation aerosol   Inhale 2 Puffs by mouth every four hours as needed for Shortness of Breath.  Dose:  2 Puff     aspirin EC 81 MG Tbec  Commonly known as:  ECOTRIN   Take 81 mg by mouth every morning.  Dose:  81 mg     fish oil  1000 MG Caps capsule   Take 1,000 mg by mouth every morning.  Dose:  1000 mg     lisinopril 10 MG Tabs  Commonly known as:  PRINIVIL   Take 10 mg by mouth every morning.  Dose:  10 mg     metformin 1000 MG tablet  Commonly known as:  GLUCOPHAGE   Take 1,000 mg by mouth 2 times a day, with meals.  Dose:  1000 mg     multivitamin Tabs   Take 1 Tab by mouth every morning.  Dose:  1 Tab                  Disposition:   Home    Diet:   Cardiac    Activity:   As tolerated    Instructions:      To see PCP in 10 days  Follow-up with cardiology in 2 weeks    The patient was instructed to return to the ER in the event of worsening symptoms. I have counseled the patient on the importance of compliance and the patient has agreed to proceed with all medical recommendations and follow up plan indicated above.   The patient understands that all medications come with benefits and risks. Risks may include permanent injury or death and these risks can be minimized with close reassessment and monitoring.        Primary Care Provider:    Dr. Jj at [facility]  Discharge summary faxed to primary care provider:  Completed  Copy of discharge summary given to the patient: Completed      Follow up appointment details :      To see PCP in 10 days  Follow-up with cardiology in 2 weeks    Pending Studies:        None    Time spent on discharge day patient visit, preparing discharge paperwork and arranging for patient follow up.    Summary of follow up issues:   Patient advised to continue dual anti-platelets for at least 1 year  Advised cardiac diet, low fat      Discharge Time (Minutes) :   45 minutes  Hospital Course Type:  Inpatient Stay >2 midnights      Condition on Discharge    ______________________________________________________________________    Interval history/exam for day of discharge:      No further events overnight, off heparin infusion, on dual antiplatelets. Concerned about side-effects with betablocker in the past --.  Explained on a low dose for cardioprotection.     Vitals:    12/08/17 2103 12/09/17 0000 12/09/17 0400 12/09/17 0800   BP: 122/77 115/70 104/58 122/70   Pulse:  75 68 67   Resp:  18 18 18   Temp:  36.4 °C (97.6 °F) 36.8 °C (98.3 °F) 36.7 °C (98 °F)   SpO2:  92% 92% 91%   Weight:       Height:         Weight/BMI: Body mass index is 31.34 kg/m².  Pulse Oximetry: 91 %, O2 (LPM): 0, O2 Delivery: None (Room Air)      Constitutional: Negative for chills, diaphoresis, fever and weight loss.   HENT: Negative for congestion, ear discharge, ear pain and nosebleeds.    Eyes: Negative for blurred vision, double vision and photophobia.   Respiratory: Negative for cough, sputum production, shortness of breath, wheezing and stridor.    Cardiovascular: Negative for chest pain, palpitations, orthopnea and leg swelling.   Gastrointestinal: Negative for abdominal pain, constipation, diarrhea, heartburn, nausea and vomiting.   Genitourinary: Negative for dysuria, frequency and urgency.   Musculoskeletal: Negative for back pain, joint pain and myalgias.   Skin: Negative for itching and rash.   Neurological: Negative for dizziness, sensory change, speech change, focal weakness, loss of consciousness and headaches.   Endo/Heme/Allergies: Negative for polydipsia. Does not bruise/bleed easily.   Psychiatric/Behavioral: Negative for depression and suicidal ideas. The patient does not have insomnia.       Most Recent Labs:    Lab Results   Component Value Date/Time    WBC 7.7 12/09/2017 04:15 AM    RBC 4.64 (L) 12/09/2017 04:15 AM    HEMOGLOBIN 13.8 (L) 12/09/2017 04:15 AM    HEMATOCRIT 41.1 (L) 12/09/2017 04:15 AM    MCV 88.6 12/09/2017 04:15 AM    MCH 29.7 12/09/2017 04:15 AM    MCHC 33.6 (L) 12/09/2017 04:15 AM    MPV 9.6 12/09/2017 04:15 AM    NEUTSPOLYS 54.20 12/09/2017 04:15 AM    LYMPHOCYTES 34.10 12/09/2017 04:15 AM    MONOCYTES 9.50 12/09/2017 04:15 AM    EOSINOPHILS 1.30 12/09/2017 04:15 AM    BASOPHILS 0.50 12/09/2017 04:15 AM       Lab Results   Component Value Date/Time    SODIUM 136 12/09/2017 04:15 AM    POTASSIUM 4.2 12/09/2017 04:15 AM    CHLORIDE 102 12/09/2017 04:15 AM    CO2 24 12/09/2017 04:15 AM    GLUCOSE 206 (H) 12/09/2017 04:15 AM    BUN 15 12/09/2017 04:15 AM    CREATININE 0.67 12/09/2017 04:15 AM      Lab Results   Component Value Date/Time    ALTSGPT 19 12/09/2017 04:15 AM    ASTSGOT 20 12/09/2017 04:15 AM    ALKPHOSPHAT 79 12/09/2017 04:15 AM    TBILIRUBIN 0.4 12/09/2017 04:15 AM    LIPASE 26 12/08/2017 02:48 PM    ALBUMIN 3.8 12/09/2017 04:15 AM    GLOBULIN 2.3 12/09/2017 04:15 AM    INR 1.10 12/08/2017 10:22 PM     Lab Results   Component Value Date/Time    PROTHROMBTM 13.9 12/08/2017 10:22 PM    INR 1.10 12/08/2017 10:22 PM          Susan Martinez MD    The patient was seen by me face to face. I personally had a discussion with the patient. The case was discussed with our resident team, I examined the patient and confirmed the essential components of the history, physical examination, diagnosis and treatment plan as needed. I agree with the patient care as documented by the resident and edited as above by me. See resident's note above for complete details of service. The overall treatment regimen will be carried out as described above.     Thank you:  Leandro Foy MD, FACP  [facility] Internal Medicine  Pager: Use Tiger Text (use resident info under treatment team for day to day floor issues)  Office: [phone] Ext. [phone]  Fax: [phone] (non PHI only)

## 2017-12-09 NOTE — ASSESSMENT & PLAN NOTE
- has 2-3 episodes of migraine headaches / month  - headache almost daily  - current migraine/hedache different to usual --> radiated to neck, B/L shoulders and chest  - as above for NSTEMI  - ctm for migraine  - Fioricet

## 2017-12-10 VITALS
RESPIRATION RATE: 16 BRPM | TEMPERATURE: 97.5 F | SYSTOLIC BLOOD PRESSURE: 125 MMHG | HEART RATE: 77 BPM | OXYGEN SATURATION: 96 % | DIASTOLIC BLOOD PRESSURE: 75 MMHG | HEIGHT: 76 IN | WEIGHT: 261.02 LBS | BODY MASS INDEX: 31.79 KG/M2

## 2017-12-10 PROBLEM — I24.9 ACUTE CORONARY SYNDROME (HCC): Status: ACTIVE | Noted: 2017-12-10

## 2017-12-10 LAB
ALBUMIN SERPL BCP-MCNC: 3.6 G/DL (ref 3.2–4.9)
ALBUMIN/GLOB SERPL: 1.6 G/DL
ALP SERPL-CCNC: 77 U/L (ref 30–99)
ALT SERPL-CCNC: 19 U/L (ref 2–50)
ANION GAP SERPL CALC-SCNC: 8 MMOL/L (ref 0–11.9)
AST SERPL-CCNC: 18 U/L (ref 12–45)
BASOPHILS # BLD AUTO: 0.6 % (ref 0–1.8)
BASOPHILS # BLD: 0.04 K/UL (ref 0–0.12)
BILIRUB SERPL-MCNC: 0.4 MG/DL (ref 0.1–1.5)
BUN SERPL-MCNC: 14 MG/DL (ref 8–22)
CALCIUM SERPL-MCNC: 8.6 MG/DL (ref 8.5–10.5)
CHLORIDE SERPL-SCNC: 106 MMOL/L (ref 96–112)
CO2 SERPL-SCNC: 21 MMOL/L (ref 20–33)
CREAT SERPL-MCNC: 0.73 MG/DL (ref 0.5–1.4)
EOSINOPHIL # BLD AUTO: 0.11 K/UL (ref 0–0.51)
EOSINOPHIL NFR BLD: 1.8 % (ref 0–6.9)
ERYTHROCYTE [DISTWIDTH] IN BLOOD BY AUTOMATED COUNT: 42.7 FL (ref 35.9–50)
GFR SERPL CREATININE-BSD FRML MDRD: >60 ML/MIN/1.73 M 2
GLOBULIN SER CALC-MCNC: 2.3 G/DL (ref 1.9–3.5)
GLUCOSE BLD-MCNC: 186 MG/DL (ref 65–99)
GLUCOSE SERPL-MCNC: 218 MG/DL (ref 65–99)
HCT VFR BLD AUTO: 38.8 % (ref 42–52)
HGB BLD-MCNC: 12.6 G/DL (ref 14–18)
IMM GRANULOCYTES # BLD AUTO: 0.01 K/UL (ref 0–0.11)
IMM GRANULOCYTES NFR BLD AUTO: 0.2 % (ref 0–0.9)
LYMPHOCYTES # BLD AUTO: 2.15 K/UL (ref 1–4.8)
LYMPHOCYTES NFR BLD: 34.7 % (ref 22–41)
MAGNESIUM SERPL-MCNC: 1.9 MG/DL (ref 1.5–2.5)
MCH RBC QN AUTO: 29 PG (ref 27–33)
MCHC RBC AUTO-ENTMCNC: 32.5 G/DL (ref 33.7–35.3)
MCV RBC AUTO: 89.2 FL (ref 81.4–97.8)
MONOCYTES # BLD AUTO: 0.47 K/UL (ref 0–0.85)
MONOCYTES NFR BLD AUTO: 7.6 % (ref 0–13.4)
NEUTROPHILS # BLD AUTO: 3.42 K/UL (ref 1.82–7.42)
NEUTROPHILS NFR BLD: 55.1 % (ref 44–72)
NRBC # BLD AUTO: 0 K/UL
NRBC BLD AUTO-RTO: 0 /100 WBC
PLATELET # BLD AUTO: 258 K/UL (ref 164–446)
PMV BLD AUTO: 9.3 FL (ref 9–12.9)
POTASSIUM SERPL-SCNC: 4.2 MMOL/L (ref 3.6–5.5)
PROT SERPL-MCNC: 5.9 G/DL (ref 6–8.2)
RBC # BLD AUTO: 4.35 M/UL (ref 4.7–6.1)
SODIUM SERPL-SCNC: 135 MMOL/L (ref 135–145)
WBC # BLD AUTO: 6.2 K/UL (ref 4.8–10.8)

## 2017-12-10 PROCEDURE — A9270 NON-COVERED ITEM OR SERVICE: HCPCS | Performed by: INTERNAL MEDICINE

## 2017-12-10 PROCEDURE — 82962 GLUCOSE BLOOD TEST: CPT

## 2017-12-10 PROCEDURE — 700111 HCHG RX REV CODE 636 W/ 250 OVERRIDE (IP): Performed by: STUDENT IN AN ORGANIZED HEALTH CARE EDUCATION/TRAINING PROGRAM

## 2017-12-10 PROCEDURE — 83735 ASSAY OF MAGNESIUM: CPT

## 2017-12-10 PROCEDURE — 36415 COLL VENOUS BLD VENIPUNCTURE: CPT

## 2017-12-10 PROCEDURE — 99239 HOSP IP/OBS DSCHRG MGMT >30: CPT | Performed by: HOSPITALIST

## 2017-12-10 PROCEDURE — 700102 HCHG RX REV CODE 250 W/ 637 OVERRIDE(OP): Performed by: INTERNAL MEDICINE

## 2017-12-10 PROCEDURE — 85025 COMPLETE CBC W/AUTO DIFF WBC: CPT

## 2017-12-10 PROCEDURE — 80053 COMPREHEN METABOLIC PANEL: CPT

## 2017-12-10 RX ORDER — BUTALBITAL, ACETAMINOPHEN AND CAFFEINE 50; 325; 40 MG/1; MG/1; MG/1
1 TABLET ORAL EVERY 6 HOURS PRN
Qty: 30 TAB | Refills: 1 | Status: SHIPPED | OUTPATIENT
Start: 2017-12-10 | End: 2021-08-12

## 2017-12-10 RX ORDER — ATORVASTATIN CALCIUM 80 MG/1
80 TABLET, FILM COATED ORAL
Qty: 30 TAB | Refills: 1 | Status: SHIPPED | OUTPATIENT
Start: 2017-12-10 | End: 2017-12-20 | Stop reason: SDUPTHER

## 2017-12-10 RX ORDER — ATORVASTATIN CALCIUM 80 MG/1
80 TABLET, FILM COATED ORAL
Qty: 30 TAB | Refills: 1 | Status: SHIPPED | OUTPATIENT
Start: 2017-12-10 | End: 2017-12-10

## 2017-12-10 RX ORDER — MAGNESIUM SULFATE HEPTAHYDRATE 40 MG/ML
2 INJECTION, SOLUTION INTRAVENOUS ONCE
Status: COMPLETED | OUTPATIENT
Start: 2017-12-10 | End: 2017-12-10

## 2017-12-10 RX ORDER — PRASUGREL 10 MG/1
10 TABLET, FILM COATED ORAL DAILY
Qty: 30 TAB | Refills: 1 | Status: SHIPPED | OUTPATIENT
Start: 2017-12-10 | End: 2017-12-10

## 2017-12-10 RX ORDER — BUTALBITAL, ACETAMINOPHEN AND CAFFEINE 50; 325; 40 MG/1; MG/1; MG/1
1 TABLET ORAL EVERY 6 HOURS PRN
Status: DISCONTINUED | OUTPATIENT
Start: 2017-12-10 | End: 2017-12-10 | Stop reason: HOSPADM

## 2017-12-10 RX ORDER — BUTALBITAL, ACETAMINOPHEN AND CAFFEINE 50; 325; 40 MG/1; MG/1; MG/1
1 TABLET ORAL EVERY 6 HOURS PRN
Qty: 30 TAB | Refills: 1 | Status: SHIPPED | OUTPATIENT
Start: 2017-12-10 | End: 2017-12-10

## 2017-12-10 RX ORDER — PRASUGREL 10 MG/1
10 TABLET, FILM COATED ORAL DAILY
Qty: 30 TAB | Refills: 1 | Status: SHIPPED | OUTPATIENT
Start: 2017-12-10 | End: 2017-12-20 | Stop reason: SDUPTHER

## 2017-12-10 RX ADMIN — MAGNESIUM SULFATE IN WATER 2 G: 40 INJECTION, SOLUTION INTRAVENOUS at 10:11

## 2017-12-10 RX ADMIN — INSULIN HUMAN 2 UNITS: 100 INJECTION, SOLUTION PARENTERAL at 06:10

## 2017-12-10 RX ADMIN — ASPIRIN 81 MG: 81 TABLET, COATED ORAL at 10:08

## 2017-12-10 RX ADMIN — PRASUGREL HYDROCHLORIDE 10 MG: 10 TABLET, FILM COATED ORAL at 10:09

## 2017-12-10 RX ADMIN — METOPROLOL TARTRATE 25 MG: 25 TABLET ORAL at 10:08

## 2017-12-10 RX ADMIN — ACETAMINOPHEN 1000 MG: 500 TABLET ORAL at 06:11

## 2017-12-10 ASSESSMENT — ENCOUNTER SYMPTOMS
ABDOMINAL PAIN: 0
FALLS: 0
VOMITING: 0
MYALGIAS: 0
BLURRED VISION: 0
EYE DISCHARGE: 0
PND: 0
COUGH: 0
PALPITATIONS: 0
SENSORY CHANGE: 0
BRUISES/BLEEDS EASILY: 0
ORTHOPNEA: 0
HEADACHES: 0
NAUSEA: 0
WEIGHT LOSS: 0
SPEECH CHANGE: 0
FEVER: 0
DEPRESSION: 0
LOSS OF CONSCIOUSNESS: 0
DOUBLE VISION: 0
SHORTNESS OF BREATH: 0
BLOOD IN STOOL: 0
EYE PAIN: 0
CHILLS: 0
DIZZINESS: 0
HALLUCINATIONS: 0
CLAUDICATION: 0

## 2017-12-10 ASSESSMENT — LIFESTYLE VARIABLES: EVER_SMOKED: YES

## 2017-12-10 ASSESSMENT — PAIN SCALES - GENERAL: PAINLEVEL_OUTOF10: 4

## 2017-12-10 NOTE — DISCHARGE PLANNING
Medical Social Work    Per bedside RN, pt needing to go home on Effient. [facility] Effient- only Plavix. Bedside RN notified

## 2017-12-10 NOTE — PROGRESS NOTES
Internal Medicine Interval Note  Note Author: Susan Martinez M.D.     Name Benoit Barkley 1965   Age/Sex 52 y.o. male   MRN [id number]   Code Status FULL CODE     After 5PM or if no immediate response to page, please call for cross-coverage  Attending/Team: Dr. Foy / [place] See Patient List for primary contact information  Call [phone] to page    1st Call - Day Intern (R1):   Dr. Martinez 2nd Call - Day Sr. Resident (R2/R3):   Dr. Wright         Reason for interval visit  (Principal Problem)   NSTEMI (non-ST elevated myocardial infarction) (CMS-[facility])    Interval Problem Daily Status Update  (24 hours)   52 y.o. Diabetic, with migraine headaches and elevated Trops  Being treated for NSTEMI  On heparin infusion  Aspirin and Atorvastatin  currently on ISS and Metformin with-held    One further episode of chest pain overnight for 10 mins, resolved spontaneously  Hemodynamically stable  Headache +    Cardiology on board  Had angiogram today - one stent in mid-LCx    1.  Successful stenting of subtotal stenosis (99%) of the mid circumflex   coronary artery with a 2.5x18 mm Xience Alpine stent.  2.  Chronic total occlusion of the first marginal artery with faint collateral   filling.  3.  Chronic total occlusion of the proximal right coronary artery with good   collateral filling of the PDA and posterior left ventricular branches via   left-sided collaterals.  4.  A 30% proximal left anterior descending lesion.  5.  A 25-30% lesion in the proximal segment of the second diagonal artery.  6.  Left ventricular dysfunction with severe hypokinesis in the mid inferior   wall.  7.  Elevated left ventricular end-diastolic pressure.    Plan  - Aspirin dose switched to 81 mg  - Prasugrel  - atorvastatin 80  - Heparin for now  - Metoprolol  - ISS today --> switch to metformin in 1-2 days      Review of Systems   Constitutional: Negative for chills, diaphoresis, fever and weight loss.   HENT: Negative for  congestion, ear discharge, ear pain and nosebleeds.    Eyes: Negative for blurred vision, double vision and photophobia.   Respiratory: Negative for cough, sputum production, shortness of breath, wheezing and stridor.    Cardiovascular: Negative for chest pain, palpitations, orthopnea and leg swelling.   Gastrointestinal: Negative for abdominal pain, constipation, diarrhea, heartburn, nausea and vomiting.   Genitourinary: Negative for dysuria, frequency and urgency.   Musculoskeletal: Negative for back pain, joint pain and myalgias.   Skin: Negative for itching and rash.   Neurological: Negative for dizziness, sensory change, speech change, focal weakness, loss of consciousness and headaches.   Endo/Heme/Allergies: Negative for polydipsia. Does not bruise/bleed easily.   Psychiatric/Behavioral: Negative for depression and suicidal ideas. The patient is nervous/anxious. The patient does not have insomnia.        Consultants/Specialty  Dr. Bobo - Cardiology    Disposition  In-patient    Quality Measures    Reviewed items::  EKG reviewed, Labs reviewed and Medications reviewed  Hogan catheter::  No Hogan  DVT prophylaxis pharmacological::  Heparin  Ulcer Prophylaxis::  No          Physical Exam       Vitals:    12/09/17 1245 12/09/17 1345 12/09/17 1445 12/09/17 1600   BP: 125/79 126/80 134/88 122/74   Pulse: 68 72 77 76   Resp:    18   Temp:    36.9 °C (98.5 °F)   SpO2: 96% 94% 96% 94%   Weight:       Height:         Body mass index is 31.34 kg/m². Weight: 116.8 kg (257 lb 8 oz)  Oxygen Therapy:  Pulse Oximetry: 94 %, O2 (LPM): 0, O2 Delivery: None (Room Air)    Physical Exam   Constitutional: He is oriented to person, place, and time. No distress.   HENT:   Head: Normocephalic and atraumatic.   Mouth/Throat: Oropharynx is clear and moist. No oropharyngeal exudate.   Eyes: Conjunctivae are normal. Pupils are equal, round, and reactive to light. Right eye exhibits no discharge. Left eye exhibits no discharge. No scleral  icterus.   Neck: No JVD present. No tracheal deviation present.   Cardiovascular: Normal rate, regular rhythm and normal heart sounds.  Exam reveals no gallop and no friction rub.    No murmur heard.  Pulmonary/Chest: Effort normal and breath sounds normal. No stridor. No respiratory distress. He has no wheezes. He has no rales.   Abdominal: Soft. Bowel sounds are normal. He exhibits no distension. There is no tenderness. There is no rebound and no guarding.   Musculoskeletal: He exhibits no edema or tenderness.   Lymphadenopathy:     He has no cervical adenopathy.   Neurological: He is alert and oriented to person, place, and time. GCS score is 15.   Skin: Skin is warm. No rash noted. He is not diaphoretic. No erythema. No pallor.   Psychiatric: Mood, memory, affect and judgment normal.         Lab Data Review:         12/9/2017  4:14 PM    Recent Labs      12/08/17   1448  12/08/17   2222  12/09/17 0415   SODIUM  136   --   136   POTASSIUM  3.9   --   4.2   CHLORIDE  102   --   102   CO2  23   --   24   BUN  14   --   15   CREATININE  0.66   --   0.67   MAGNESIUM   --   1.8  1.8   CALCIUM  9.4   --   9.3       Recent Labs      12/08/17   1448  12/09/17   0415   ALTSGPT  23  19   ASTSGOT  25  20   ALKPHOSPHAT  81  79   TBILIRUBIN  0.6  0.4   LIPASE  26   --    GLUCOSE  157*  206*       Recent Labs      12/08/17   1448  12/08/17   2222  12/09/17 0415   RBC  4.88   --   4.64*   HEMOGLOBIN  14.4   --   13.8*   HEMATOCRIT  42.5   --   41.1*   PLATELETCT  304   --   269   PROTHROMBTM  12.9  13.9   --    APTT  36.1*   --   49.4*   INR  1.00  1.10   --        Recent Labs      12/08/17   1448  12/09/17   0415   WBC  11.0*  7.7   NEUTSPOLYS  62.30  54.20   LYMPHOCYTES  28.20  34.10   MONOCYTES  7.70  9.50   EOSINOPHILS  1.00  1.30   BASOPHILS  0.50  0.50   ASTSGOT  25  20   ALTSGPT  23  19   ALKPHOSPHAT  81  79   TBILIRUBIN  0.6  0.4           Assessment/Plan     * NSTEMI (non-ST elevated myocardial infarction)  (CMS-HCC)   Assessment & Plan    - Atypical migraine type headache, but pain unusually radiated down his neck, B/L shoulders and all across his anterior chest above nipple line  - [facility] 1[facility]7  - Trop trended up to 4.72 in [facility] ER at 2.30 pm  - EKG : probable inferior infarct, Q wave leads II, III  - cardiology on board  - Cardiac cath today : angioplasty and stent to 99% stenotic lesion in LCx  Plan  - appreciate cardiology recs  - aspirin changed to 81 mg   - prasugrel  - Atorvastatin 80 mg  - continue Lisinopril & Metoprolol  - Echo  - continue heparin infusion for now  - telemonitoring          Type 2 diabetes mellitus (CMS-HCC)   Assessment & Plan    - known type II diabetes  - on Metformin 1000 mg BID   - was started on a new anti-diabetic medication 12 days ago to reduce HbA1c  - HbA1c 8.5  Plan  - ISS  - Hypoglycemia protocol  - resume home meds tomorrow            Essential hypertension   Assessment & Plan    - on Lisinopril 10 mg at home  Plan  - continue Lisinopril  - Beta-blocker commenced  - PRN labetolol        Dyslipidemia   Assessment & Plan    - usually on 10 mg atorvastatin  Plan  - Atorvastatin increased to 80 mg        Migraine   Assessment & Plan    - has 2-3 episodes of migraine headaches / month  - headache almost daily  - current migraine/hedache different to usual --> radiated to neck, B/L shoulders and chest  - as above for NSTEMI  - ctm for migraine  - PRN analgesics            Susan Martinez MD    The patient was seen by me face to face. I personally had a discussion with the patient. The case was discussed with our resident team, I examined the patient and confirmed the essential components of the history, physical examination, diagnosis and treatment plan as needed. I agree with the patient care as documented by the resident and edited as above by me. See resident's note above for complete details of service. The overall treatment regimen will be carried out as described  above.     Thank you:  Leandro Foy MD, FACP  [facility] Internal Medicine  Pager: Use Tiger Text (use resident info under treatment team for day to day floor issues)  Office: [phone] Ext. [phone]  Fax: [phone] (non PHI only)

## 2017-12-10 NOTE — PROGRESS NOTES
Patient denies any needs at this time. Patient instructed to call for assistance. Call light within reach. Will report to oncoming shift

## 2017-12-10 NOTE — DISCHARGE INSTRUCTIONS
Discharge Instructions    Discharged to home by car with relative. Discharged via wheelchair, hospital escort: Yes.  Special equipment needed: Not Applicable    Be sure to schedule a follow-up appointment with your primary care doctor or any specialists as instructed.     Discharge Plan:   Diet Plan: Discussed  Activity Level: Discussed  Confirmed Follow up Appointment: Appointment Scheduled  Confirmed Symptoms Management: Discussed  Medication Reconciliation Updated: Yes  Pneumococcal Vaccine Given - only chart on this line when given: Given (See MAR)  Influenza Vaccine Indication: Indicated: 9 to 64 years of age  Influenza Vaccine Given - only chart on this line when given: Influenza Vaccine Given (See MAR)    I understand that a diet low in cholesterol, fat, and sodium is recommended for good health. Unless I have been given specific instructions below for another diet, I accept this instruction as my diet prescription.   Other diet: Cardiac    Special Instructions: Diagnosis:  Acute Coronary Syndrome (ACS) is a diagnosis that encompasses cardiac-related chest pain and heart attack. ACS occurs when the blood flow to the heart muscle is severely reduced or cut off completely due to a slow process called atherosclerosis.  Atherosclerosis is a disease in which the coronary arteries become narrow from a buildup of fat, cholesterol, and other substances that combine to form plaque. If the plaque breaks, a blood clot will form and block the blood flow to the heart muscle. This lack of blood flow can cause damage or death to the heart muscle which is called a heart attack or Myocardial Infarction (MI). There are two different types of MIs:  ST Elevation Myocardial Infarction or STEMI (the most severe type of heart attack) and Non-ST Elevation Myocardial Infarction or NSTEMI.    Treatment Plan:  · Cardiac Diet  - Low fat, low salt, low cholesterol   · Cardiac Rehab  - Your doctor has ordered you a referral to [facility].  Call [phone] to schedule an appointment.  · Attend my follow-up appointment with my Cardiologist.  · Take my medications as prescribed by my doctor  · Exercise daily  · Lower my bad cholesterol and raise my good cholesterol and Control my diabetes    Medications:  Certain medications are used to treat ACS.  Remember to always take medications as prescribed and never stop talking medications unless told by your doctor.    You have been prescribed the following medicatons:    Aspirin - Aspirin is used as a blood thinning medication and you will require this medication indefinitely.  Anti-platelet/blood thinner - Your Anti-platelet/Blood thinning medication is called Effient, and is used in combination with aspirin to prevent clots from forming in your heart and/or around your stent.  Your doctor will determine how long you need to be on this medicine.  Beta-Blocker - Beta-Blocker Metoprolol is used to lower blood pressure and heart rate, and/or helps your heart heal after a heart attack.  Statin - Statin Atorvastatin is used to lower cholesterol.    · Is patient discharged on Warfarin / Coumadin?   No     · Is patient Post Blood Transfusion?  No    Depression / Suicide Risk    As you are discharged from this [facility] facility, it is important to learn how to keep safe from harming yourself.    Recognize the warning signs:  · Abrupt changes in personality, positive or negative- including increase in energy   · Giving away possessions  · Change in eating patterns- significant weight changes-  positive or negative  · Change in sleeping patterns- unable to sleep or sleeping all the time   · Unwillingness or inability to communicate  · Depression  · Unusual sadness, discouragement and loneliness  · Talk of wanting to die  · Neglect of personal appearance   · Rebelliousness- reckless behavior  · Withdrawal from people/activities they love  · Confusion- inability to concentrate     If you or a loved one  observes any of these behaviors or has concerns about self-harm, here's what you can do:  · Talk about it- your feelings and reasons for harming yourself  · Remove any means that you might use to hurt yourself (examples: pills, rope, extension cords, firearm)  · Get professional help from the community (Mental Health, Substance Abuse, psychological counseling)  · Do not be alone:Call your Safe Contact- someone whom you trust who will be there for you.  · Call your local CRISIS HOTLINE 433-2213 or 741-450-8234  · Call your local Children's Mobile Crisis Response Team [place] (361) 548-6928 or www.[employer]  · Call the toll free National Suicide Prevention Hotlines   · National Suicide Prevention Lifeline 216-045-FHUV (2209)  · National Hope Line Network 800-SUICIDE (259-7187)

## 2017-12-10 NOTE — DISCHARGE PLANNING
Medical Social Work    Per bedside RN, she spoke with Meir DRIVER, who stated she had pt's before that were [facility]. SW informed bedside RN that either way, pt will need [facility] to have them re-write and fill.

## 2017-12-10 NOTE — CARE PLAN
Problem: Pain Management  Goal: Pain level will decrease to patient's comfort goal  Outcome: PROGRESSING AS EXPECTED  Rn educated patient on pain management and control. Patient verbalized understanding of education. Patient will call in pain increases or changes. Call light within reach    Problem: Infection  Goal: Will remain free from infection  Outcome: PROGRESSING AS EXPECTED  RN educated patient on infection control and precautions. Patient verbalized understanding of education.

## 2017-12-10 NOTE — PROGRESS NOTES
Cardiology Progress Note               Author: [email or web address] To Date & Time created: 12/10/2017  8:16 AM     Interval History:  No acute events overnight.  No telemetry events.    Cath 12/10/2017  1.  Successful stenting of subtotal stenosis (99%) of the mid circumflex   coronary artery with a 2.5x18 mm Xience Alpine stent.  2.  Chronic total occlusion of the first marginal artery with faint collateral   filling.  3.  Chronic total occlusion of the proximal right coronary artery with good   collateral filling of the PDA and posterior left ventricular branches via   left-sided collaterals.  4.  A 30% proximal left anterior descending lesion.  5.  A 25-30% lesion in the proximal segment of the second diagonal artery.  6.  Left ventricular dysfunction with severe hypokinesis in the mid inferior   wall.  7.  Elevated left ventricular end-diastolic pressure.    Review of Systems   Constitutional: Negative for chills, fever, malaise/fatigue and weight loss.   HENT: Negative for ear discharge, ear pain, hearing loss and nosebleeds.    Eyes: Negative for blurred vision, double vision, pain and discharge.   Respiratory: Negative for cough and shortness of breath.    Cardiovascular: Negative for chest pain, palpitations, orthopnea, claudication, leg swelling and PND.   Gastrointestinal: Negative for abdominal pain, blood in stool, melena, nausea and vomiting.   Genitourinary: Negative for dysuria and hematuria.   Musculoskeletal: Negative for falls, joint pain and myalgias.   Skin: Negative for itching and rash.   Neurological: Negative for dizziness, sensory change, speech change, loss of consciousness and headaches.   Endo/Heme/Allergies: Negative for environmental allergies. Does not bruise/bleed easily.   Psychiatric/Behavioral: Negative for depression, hallucinations and suicidal ideas.       Physical Exam   Constitutional: He is oriented to person, place, and time. He appears well-developed and well-nourished.   HENT:    Head: Normocephalic and atraumatic.   Eyes: EOM are normal.   Neck: Normal range of motion. No JVD present.   Cardiovascular: Normal rate, regular rhythm, normal heart sounds and intact distal pulses.  Exam reveals no gallop and no friction rub.    No murmur heard.  Bilateral femoral pulses are 2+, bilateral dorsalis pedis pulses are 2+, bilateral posterior tibialis pulses are 2+.   Pulmonary/Chest: No respiratory distress. He has no wheezes. He has no rales. He exhibits no tenderness.   Abdominal: Soft. Bowel sounds are normal. There is no tenderness. There is no rebound and no guarding.   The is no presence of abdominal bruits   Musculoskeletal: Normal range of motion.   Neurological: He is alert and oriented to person, place, and time.   Skin: Skin is warm and dry.   Psychiatric: He has a normal mood and affect.   Nursing note and vitals reviewed.      Hemodynamics:  Temp (24hrs), Av.7 °C (98.1 °F), Min:36.2 °C (97.2 °F), Max:37.1 °C (98.7 °F)  Temperature: 36.2 °C (97.2 °F)  Pulse  Av.9  Min: 62  Max: 86   Blood Pressure: 119/69     Respiratory:    Respiration: 16, Pulse Oximetry: 96 %           Fluids:     Weight: 118.4 kg (261 lb 0.4 oz)  GI/Nutrition:  Orders Placed This Encounter   Procedures   • Diet Order     Standing Status:   Standing     Number of Occurrences:   1     Order Specific Question:   Diet:     Answer:   Diabetic [3]     Lab Results:  Recent Labs      17   1448  175  12/10/17   0327   WBC  11.0*  7.7  6.2   RBC  4.88  4.64*  4.35*   HEMOGLOBIN  14.4  13.8*  12.6*   HEMATOCRIT  42.5  41.1*  38.8*   MCV  87.1  88.6  89.2   MCH  29.5  29.7  29.0   MCHC  33.9  33.6*  32.5*   RDW  41.6  42.5  42.7   PLATELETCT  304  269  258   MPV  9.4  9.6  9.3     Recent Labs      17   1448  175  12/10/17   0327   SODIUM  136  136  135   POTASSIUM  3.9  4.2  4.2   CHLORIDE  102  102  106   CO2  23  24  21   GLUCOSE  157*  206*  218*   BUN  14  15  14   CREATININE   0.66  0.67  0.73   CALCIUM  9.4  9.3  8.6     Recent Labs      12/08/17   1448  12/08/17   2222  12/09/17   0415   APTT  36.1*   --   49.4*   INR  1.00  1.10   --      Recent Labs      12/08/17   1448   BNPBTYPENAT  42     Recent Labs      12/08/17   1448  12/08/17   2222  12/09/17   0415   TROPONINI  4.72*  2.79*  3.66*   BNPBTYPENAT  42   --    --      Recent Labs      12/09/17   0415   TRIGLYCERIDE  495*   HDL  28*   LDL  see below         Medical Decision Making, by Problem:  Active Hospital Problems    Diagnosis   • *NSTEMI (non-ST elevated myocardial infarction) (CMS-[facility]) [I21.4]   • Type 2 diabetes mellitus (CMS-[facility]) [E11.9]   • Dyslipidemia [E78.5]   • Essential hypertension [I10]   • Migraine [G43.909]        Acute coronary syndrome.    Plan:    Continue asa, Effient, BB, ACE-I atorvastatin at current dose.  Patient had symptomatic bradycardia in the past in the outpatient.    Will sign off at this time, please call us with further questions.  Patient will be followed in the outpatient cardiology clinic for further cardiac care.    Thank you for referring this patient to our cardiology service.    Sandie Bobo MD.  [facility].        Reviewed items::  EKG reviewed, Radiology images reviewed, Labs reviewed and Medications reviewed

## 2017-12-20 ENCOUNTER — OFFICE VISIT (OUTPATIENT)
Dept: CARDIOLOGY | Facility: MEDICAL CENTER | Age: 52
End: 2017-12-20
Payer: COMMERCIAL

## 2017-12-20 VITALS
BODY MASS INDEX: 32.15 KG/M2 | WEIGHT: 264 LBS | HEART RATE: 62 BPM | SYSTOLIC BLOOD PRESSURE: 112 MMHG | DIASTOLIC BLOOD PRESSURE: 76 MMHG | HEIGHT: 76 IN | OXYGEN SATURATION: 94 %

## 2017-12-20 DIAGNOSIS — E11.59 TYPE 2 DIABETES MELLITUS WITH OTHER CIRCULATORY COMPLICATION, WITHOUT LONG-TERM CURRENT USE OF INSULIN (HCC): ICD-10-CM

## 2017-12-20 DIAGNOSIS — I10 ESSENTIAL HYPERTENSION: ICD-10-CM

## 2017-12-20 DIAGNOSIS — I25.10 CORONARY ARTERY DISEASE INVOLVING NATIVE CORONARY ARTERY OF NATIVE HEART WITHOUT ANGINA PECTORIS: Primary | ICD-10-CM

## 2017-12-20 DIAGNOSIS — E78.5 DYSLIPIDEMIA: ICD-10-CM

## 2017-12-20 DIAGNOSIS — G89.21 CHRONIC PAIN DUE TO TRAUMA: ICD-10-CM

## 2017-12-20 DIAGNOSIS — Z95.5 S/P DRUG ELUTING CORONARY STENT PLACEMENT: ICD-10-CM

## 2017-12-20 PROCEDURE — 99214 OFFICE O/P EST MOD 30 MIN: CPT | Performed by: NURSE PRACTITIONER

## 2017-12-20 RX ORDER — LISINOPRIL 10 MG/1
10 TABLET ORAL DAILY
Qty: 90 TAB | Refills: 3 | Status: SHIPPED | OUTPATIENT
Start: 2017-12-20 | End: 2018-01-29

## 2017-12-20 RX ORDER — PRASUGREL 10 MG/1
10 TABLET, FILM COATED ORAL DAILY
Qty: 90 TAB | Refills: 3 | Status: SHIPPED | OUTPATIENT
Start: 2017-12-20 | End: 2018-01-29

## 2017-12-20 RX ORDER — ATORVASTATIN CALCIUM 80 MG/1
80 TABLET, FILM COATED ORAL EVERY EVENING
Qty: 90 TAB | Refills: 3 | Status: SHIPPED | OUTPATIENT
Start: 2017-12-20

## 2017-12-20 ASSESSMENT — ENCOUNTER SYMPTOMS
COUGH: 0
SHORTNESS OF BREATH: 0
WEAKNESS: 0
PND: 0
PALPITATIONS: 0
ABDOMINAL PAIN: 0
DIZZINESS: 0
ORTHOPNEA: 0
CLAUDICATION: 0
MYALGIAS: 0
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
## 2017-12-21 ENCOUNTER — TELEPHONE (OUTPATIENT)
Dept: CARDIOLOGY | Facility: MEDICAL CENTER | Age: 52
End: 2017-12-21

## 2017-12-21 DIAGNOSIS — I25.10 CORONARY ARTERY DISEASE INVOLVING NATIVE CORONARY ARTERY OF NATIVE HEART WITHOUT ANGINA PECTORIS: ICD-10-CM

## 2017-12-21 NOTE — TELEPHONE ENCOUNTER
Called pt. To advise.   Nurse will discuss with Dr. Roberts and call pt. With possible appointment.

## 2017-12-21 NOTE — TELEPHONE ENCOUNTER
Per Dr. Mukherjee: pt. Needs referral to Dr. Roberts for consideration of  of right coronary artery.

## 2017-12-21 NOTE — TELEPHONE ENCOUNTER
----- Message from Shabnam Roberts M.D. sent at 12/21/2017 11:54 AM PST -----  He has  RCA but seems to be doing reasonably well  He was told that he may need a stent  Please schedule stress MPI to assess degree of ischemia and arrange for appointment with me after

## 2017-12-21 NOTE — TELEPHONE ENCOUNTER
Called pt. To advise.  alerted to call pt. To schedule MPI and schedule FV with Dr. MEYER afterward to discuss.      Previous Messages      ----- Message -----   From: Shabnam Roberts M.D.   Sent: 12/21/2017  11:54 AM   To: Rosalia Liang R.N.     He has  RCA but seems to be doing reasonably well   He was told that he may need a stent   Please schedule stress MPI to assess degree of ischemia and arrange for appointment with me after

## 2017-12-21 NOTE — TELEPHONE ENCOUNTER
----- Message from DIAMOND Raines sent at 12/20/2017  9:17 AM PST -----  Regarding: another stent?  Dr. Mukherjee, the patient told me that he was told that he may need another stent in approximately a month. I see nothing regarding this and the notes.    Patient is doing brisk walks and tolerating it well. He has no anginal symptoms. Please let me know if we need to schedule this patient for another procedure. Sincerely, [place].

## 2017-12-26 ENCOUNTER — TELEPHONE (OUTPATIENT)
Dept: CARDIOLOGY | Facility: MEDICAL CENTER | Age: 52
End: 2017-12-26

## 2017-12-26 ENCOUNTER — NON-PROVIDER VISIT (OUTPATIENT)
Dept: CARDIOLOGY | Facility: MEDICAL CENTER | Age: 52
End: 2017-12-26
Payer: COMMERCIAL

## 2017-12-26 VITALS
HEIGHT: 76 IN | HEART RATE: 67 BPM | DIASTOLIC BLOOD PRESSURE: 68 MMHG | SYSTOLIC BLOOD PRESSURE: 125 MMHG | BODY MASS INDEX: 31.42 KG/M2 | WEIGHT: 258 LBS

## 2017-12-26 DIAGNOSIS — Z95.5 S/P DRUG ELUTING CORONARY STENT PLACEMENT: ICD-10-CM

## 2017-12-26 PROCEDURE — G0423 INTENS CARDIAC REHAB NO EXER: HCPCS | Mod: 59 | Performed by: FAMILY MEDICINE

## 2017-12-26 ASSESSMENT — PATIENT HEALTH QUESTIONNAIRE - PHQ9
9. THOUGHTS THAT YOU WOULD BE BETTER OFF DEAD, OR OF HURTING YOURSELF: 0
5. POOR APPETITE OR OVEREATING: 0
SUM OF ALL RESPONSES TO PHQ QUESTIONS 1-9: 1
SUM OF ALL RESPONSES TO PHQ9 QUESTIONS 1 AND 2: 0
4. FEELING TIRED OR HAVING LITTLE ENERGY: 1
3. TROUBLE FALLING OR STAYING ASLEEP OR SLEEPING TOO MUCH: 0
8. MOVING OR SPEAKING SO SLOWLY THAT OTHER PEOPLE COULD HAVE NOTICED. OR THE OPPOSITE, BEING SO FIGETY OR RESTLESS THAT YOU HAVE BEEN MOVING AROUND A LOT MORE THAN USUAL: 0
6. FEELING BAD ABOUT YOURSELF - OR THAT YOU ARE A FAILURE OR HAVE LET YOURSELF OR YOUR FAMILY DOWN: 0
2. FEELING DOWN, DEPRESSED, IRRITABLE, OR HOPELESS: 0
SUM OF ALL RESPONSES TO PHQ QUESTIONS 1-9: 1
1. LITTLE INTEREST OR PLEASURE IN DOING THINGS: 0
7. TROUBLE CONCENTRATING ON THINGS, SUCH AS READING THE NEWSPAPER OR WATCHING TELEVISION: 0

## 2017-12-26 ASSESSMENT — PAIN SCALES - GENERAL: PAINLEVEL: 4=SLIGHT-MODERATE PAIN

## 2017-12-26 NOTE — PROGRESS NOTES
Cardiac Rehab Individual Treatment Plan Assessment: Initial 17 Session #     1   MRN: [id number]   Allergies: Patient has no known allergies.   Patient Name: Benoit Barkley : 1965 Risk Stratification: High    Diagnoses:   1. S/P drug eluting coronary stent placement     Age: 52 y.o. Physician: Regina Jj M.D.    Date of Event: 17 Specialist: Yaz   Risk Factors:  Hypertension, Hyperlipidemia, Diabetes, Family History, Age, Male > 45   Exercise Nutrition Education Psychosocial   Stages of change Stages of change Stages of change Stages of change   Preparation Preparation Preparation Preparation   Fitness Test Lipids Learning Barriers Outcome Survey Tools   DIST: 1216  Available: Yes Learning Barriers: Vision FP QOL Overall Score: 29.48   Max HR: 84 Date: 17 Family Support PHQ-9: 1   RPE: 7 Total: 126 mg/dL Yes Nutrition Screen: 64 %   SPO2: 97 % Tri mg/dL Lives: Spouse Intervention   MET: 3.53 HDL: 28 mg/dL Tobacco Use Behavioral Health Consult: Yes   EF= 65 (2017) LDL:  (na, due to elevated Tg's. ) History   Smoking Status   • Never Smoker   Smokeless Tobacco   • Never Used      Physician Referral: Yes   Ambulatory Status Diabetes Smoking Intervention Identifies Stressors: Yes   Fall Risk Assessed: Yes Diabetes: Yes Smoking Cessation Referral: N/A Drug Intervention: No   Exercise Ambulatory Status Assist Devices: None HbA1C: 8.5 % Date: 17 Ind. Education / Counseling: N/A Education   Exercise Prescription Monitors BS at Home: Yes Tobacco Adjunct: N/A Psychosocial Education: Coping Techniques, Positive Support System, S/S Depression   Mode: Treadmill, NuStep, UBE, Airdyne   Frequency: tries to every day Random BS: 176 Education Intervention Target Goal   Frequency:  3 days/week Weight Management Healthy Heart Education: Class Schedule Given, Medications Reviewed, Patient Education Binder Provided, Risk Factors Discussed, Videos Viewed per Missael Assess presence or  "absence of depression using a valid screening: Yes   Duration:  25-35 min Weight: 117 kg (258 lb) Target Goal Use Stress Management: Yes   Intensity:  11-13 RPE Height: 193 cm (6' 3.98\") Complete Tobacco Cessation: Complete Tobacco Cessation: N/A Adverse Events: Yes   METS:  1.0-3.0 BMI (Calculated): 31.42 Educate / Review and have understanding of cardiac disease prevention: Educate / Review and have understanding of cardiac disease prevention: Yes Unexpected Events: Yes   Progression:  ^ increments of 1-3 to THR/RPE as tolerated Goal weight: 240 Medication Compliance: Yes    THR: THR: Other (see comment) (118-128) History   Alcohol Use No         Angina with Exercise?  Angina with Exercise: No      Resistance Training?  Resistance Training: Yes      Hypertension      Diagnosed with HTN: Yes Intervention      Resting BP: 125/68 Dietician Consult/Class: Pending (scheduled)      Peak Ex BP: 140/84 Nurse/Patient Discussion: Yes     Intervention Diabetes Ed Referral: Yes     Home Exercise:  Yes Discuss Maintenance /Wt Loss: Yes     Mode: Walk Attend Cooking School: Pending (scheduled)     Duration: 20-30 min Dietary Goal: >=58 rate your plate, low sodium, low sugar DM2     Frequency: 7 days active  Education     Education Nutrition Education: S&S Hypo/Hyper glycemia, Relate Diabetes to CAD, Healthy Eating, Sodium Reduction     Equipment Orientation, Exercise Safety, S/S to Report, RPE Scale, Warm Up / Cool Down, Physically Active Target Goal     Target Goal LDL-C < 100 if trig. > 200:  Yes       Start Individual Exercise Rx Yes LDL-C < 70 for high risk patient:  Yes       BP < 140/90 or < 130/80 if DM or CKD Yes Non HDL-C Should be < 130:  Yes       Aerobic activity 30 + min / day 5 days / wk Yes HbA1C < 7%: Yes         BMI < 25: No (<29)       Notes: Straight CR patient, 21 session, education first 6 visits.  No body limitations with exercise.   Initial Assessment:  Heart Sounds: S1S2 WNL       Lung Sounds: clear " "throughout bilaterally       Edema: none present       Right Peripheral Pulses:  Carotid: 2+  Radial: 2+  Dorsalis Pedis: 2+  Posterior Tibialis: 2+   Left Peripheral Pulses:  Carotid: 2+  Radial: 2+  Dorsalis Pedis: 2+  Posterior Tibialis: 2+    Incision: none present       Color: pink       Frame Size: Medium        Cognitive Assessment: A&O X 4, reports some memory problems.       Fall Assessment:    Gait: steady  Balance: steady  Upper Body: no pain or limitations with ROM  Lower Body: no pain or limitation wtih ROM   Recent Falls: none reported.   Current Medications and Vaccinations: Reviewed  Patient Stated Goals: \"I would like to lose weight, I would like to get off of diabetes medication or at least cut them in half.  I would like to know my limits and what exercise I can do out of cardiac rehab.  I would like to be able to recognize stress and learn better coping techniques.\"  Other: Benoit is present for orientation today.  Exercise current: walking about 2-40 minutes mos tdays of the week.  Goal: to know exercise limits, to lose weight, to exercise safely.  Progress: walks most days of the week is engaged to starting the program.  Nutrition current: uses Mrs. Salgado, reading labels, carb control.  Goals: to learn more about a heart healthy diet and what he should and shouldn't be eating.  Progress: has already made several dietary changes and reads labels.  Willing to learn.  Stress current: he says that his event is just now hitting home for him and says that he does feel some stress.  Goal: learn to recognize stress and cope with it, finding coping and relaxation techniques.  Progress: is planning to take Kiswahili Chi.         "

## 2017-12-26 NOTE — Clinical Note
Orientation. (It is possible that patient may need another stent in the near future.  Consulting with cardiology 12/28/2017 for determination).

## 2017-12-26 NOTE — PROGRESS NOTES
Date: 12/26/2017    Initial Individual Treatment Plan review for the [facility] Intensive Cardiac Rehabilitation (ICR) Program.    Benoit Barkley, 52 y.o. male, with qualifying diagnosis/diagnoses of S/P Stent.  Co-morbid conditions include Hypertension, Hyperlipidemia, Diabetes, Family History, Age, Male > 45.    Primary Care Provider: Regina Jj M.D.    Heart Specialist (s): Dr. Mukherjee    Patient has successfully completed 1 Exercise Sessions, and an appropriate number of corresponding Education Sessions.  Patient is an excellent candidate for the [facility] Intensive Cardiac Rehabilitation (ICR) Program.    I will review the Individual Treatment Plan again at 30 day post-initiation of ICR.    Bg Lagos MD, FAAFP  , [facility] Intensive Cardiac Rehabilitation (ICR) Program

## 2017-12-26 NOTE — TELEPHONE ENCOUNTER
----- Message from Lolita Meneses sent at 12/26/2017  9:07 AM PST -----  Regarding: patient at cardiac rehab, needs clearance  JOCELINE/Lauren Ge (director at cardiac rehab) is calling for a clearance. Patient is with her now. She can be reached at [phone].

## 2017-12-26 NOTE — TELEPHONE ENCOUNTER
Discussed chart note per  [facility] and they will follow this, if patient has problems they will stop exercise.

## 2017-12-27 ENCOUNTER — PATIENT MESSAGE (OUTPATIENT)
Dept: CARDIOLOGY | Facility: MEDICAL CENTER | Age: 52
End: 2017-12-27

## 2017-12-27 NOTE — TELEPHONE ENCOUNTER
----- Message from Pennie Mcgregor sent at 12/27/2017  3:02 PM PST -----  Regarding: med advice  Contact: [phone]  YENIFER/tessie    Pt calling to report his nuc med cardiac stress test has been cancelled for tomorrow because auth from [facility] has not arrived.    Pt asking if he should resume taking his beta blocker metoprolol.  Please call pt at  to advise.

## 2018-01-04 ENCOUNTER — HOSPITAL ENCOUNTER (OUTPATIENT)
Dept: RADIOLOGY | Facility: MEDICAL CENTER | Age: 53
End: 2018-01-04
Attending: INTERNAL MEDICINE
Payer: COMMERCIAL

## 2018-01-04 DIAGNOSIS — I25.10 CORONARY ARTERY DISEASE INVOLVING NATIVE CORONARY ARTERY OF NATIVE HEART WITHOUT ANGINA PECTORIS: ICD-10-CM

## 2018-01-04 PROCEDURE — A9502 TC99M TETROFOSMIN: HCPCS

## 2018-01-04 PROCEDURE — 700111 HCHG RX REV CODE 636 W/ 250 OVERRIDE (IP)

## 2018-01-04 RX ORDER — REGADENOSON 0.08 MG/ML
INJECTION, SOLUTION INTRAVENOUS
Status: COMPLETED
Start: 2018-01-04 | End: 2018-01-04

## 2018-01-04 RX ADMIN — REGADENOSON 0.4 MG: 0.08 INJECTION, SOLUTION INTRAVENOUS at 10:28

## 2018-01-05 ENCOUNTER — TELEPHONE (OUTPATIENT)
Dept: CARDIOLOGY | Facility: MEDICAL CENTER | Age: 53
End: 2018-01-05

## 2018-01-05 NOTE — PROGRESS NOTES
NUC MED NOTE:  ATTEMPTED TREADMILL AND UNABLE D/T HIP/KNEE SURGERIES, SWITCHED TEST TO CHEMICAL,  PT TOLERATED TEST WELL AND GIVEN DIET COKE AFTER TO HELP WITH SIDE EFFECTS OF LEXISCAN, RECEIVED PHONE CALL AROUND 1600 STATING THAT HE FELT WIPED OUT AND WANTED TO KNOW IF IT COULD BE FROM THE LEXISCAN,  PT STATED PREVIOUSLY THAT HE WAS SENSITIVE TO CAFFEINE HENCE NO COFFEE AND CHOSE DIET COKE AND STATED HE HAD  ICE TEA FOR LUNCH.  NO C/O CP,  REFERRED TO VISIT ER IF HE WAS HAVING CP BUT THE LEXISCAN IS PROBABLY NOT THE SOURCE OF BEING TIRED AND MAYBE THE CAFFEINE WITH THE EXERCISE OF THE TREADMILL IS THE CAUSE, D/W DR. BUSTILLO WHO AGREED.

## 2018-01-06 NOTE — TELEPHONE ENCOUNTER
Patient informed and agrees to have .  He is available for FV next week or 1/17 per CR MD.  Spoke with Hiwot, scheduling supervisor and she will schedule FV when CR MD's schedule is opened.

## 2018-01-06 NOTE — TELEPHONE ENCOUNTER
----- Message from Shabnam Roberts M.D. sent at 1/5/2018  4:33 PM PST -----  Stress test is positive  Should benefit from PCI of  RCA  Please arrange for office visit with me later this month  Just asked Yahaira to open up clinic next week and on the 17th  He could come in one of those two days if possible  Could potentially do procedure 2/1    ----- Message -----  From: Rosalia Liang R.N.  Sent: 1/4/2018   3:16 PM  To: Shabnam Roberts M.D.    FV 2/12.  To YENIFER ROOT.

## 2018-01-08 ENCOUNTER — TELEPHONE (OUTPATIENT)
Dept: CARDIOLOGY | Facility: MEDICAL CENTER | Age: 53
End: 2018-01-08

## 2018-01-09 ENCOUNTER — HOSPITAL ENCOUNTER (OUTPATIENT)
Dept: LAB | Facility: MEDICAL CENTER | Age: 53
End: 2018-01-09
Attending: NURSE PRACTITIONER
Payer: MEDICARE

## 2018-01-09 DIAGNOSIS — E78.5 DYSLIPIDEMIA: ICD-10-CM

## 2018-01-09 PROCEDURE — 80053 COMPREHEN METABOLIC PANEL: CPT

## 2018-01-09 PROCEDURE — 80061 LIPID PANEL: CPT

## 2018-01-09 PROCEDURE — 36415 COLL VENOUS BLD VENIPUNCTURE: CPT

## 2018-01-10 ENCOUNTER — TELEPHONE (OUTPATIENT)
Dept: CARDIOLOGY | Facility: MEDICAL CENTER | Age: 53
End: 2018-01-10

## 2018-01-10 ENCOUNTER — OFFICE VISIT (OUTPATIENT)
Dept: CARDIOLOGY | Facility: MEDICAL CENTER | Age: 53
End: 2018-01-10
Payer: COMMERCIAL

## 2018-01-10 VITALS
HEART RATE: 68 BPM | BODY MASS INDEX: 31.42 KG/M2 | DIASTOLIC BLOOD PRESSURE: 78 MMHG | WEIGHT: 258 LBS | HEIGHT: 76 IN | OXYGEN SATURATION: 95 % | SYSTOLIC BLOOD PRESSURE: 140 MMHG

## 2018-01-10 DIAGNOSIS — I25.10 CHRONIC TOTAL OCCLUSION OF NATIVE CORONARY ARTERY: ICD-10-CM

## 2018-01-10 DIAGNOSIS — E78.5 DYSLIPIDEMIA: ICD-10-CM

## 2018-01-10 DIAGNOSIS — Z95.5 S/P DRUG ELUTING CORONARY STENT PLACEMENT: ICD-10-CM

## 2018-01-10 DIAGNOSIS — I10 ESSENTIAL HYPERTENSION: ICD-10-CM

## 2018-01-10 DIAGNOSIS — I25.82 CHRONIC TOTAL OCCLUSION OF NATIVE CORONARY ARTERY: ICD-10-CM

## 2018-01-10 LAB
ALBUMIN SERPL BCP-MCNC: 4.4 G/DL (ref 3.2–4.9)
ALBUMIN/GLOB SERPL: 1.8 G/DL
ALP SERPL-CCNC: 70 U/L (ref 30–99)
ALT SERPL-CCNC: 22 U/L (ref 2–50)
ANION GAP SERPL CALC-SCNC: 8 MMOL/L (ref 0–11.9)
AST SERPL-CCNC: 18 U/L (ref 12–45)
BILIRUB SERPL-MCNC: 0.4 MG/DL (ref 0.1–1.5)
BUN SERPL-MCNC: 19 MG/DL (ref 8–22)
CALCIUM SERPL-MCNC: 9.5 MG/DL (ref 8.5–10.5)
CHLORIDE SERPL-SCNC: 104 MMOL/L (ref 96–112)
CHOLEST SERPL-MCNC: 114 MG/DL (ref 100–199)
CO2 SERPL-SCNC: 25 MMOL/L (ref 20–33)
CREAT SERPL-MCNC: 0.73 MG/DL (ref 0.5–1.4)
GLOBULIN SER CALC-MCNC: 2.5 G/DL (ref 1.9–3.5)
GLUCOSE SERPL-MCNC: 203 MG/DL (ref 65–99)
HDLC SERPL-MCNC: 32 MG/DL
LDLC SERPL CALC-MCNC: 25 MG/DL
POTASSIUM SERPL-SCNC: 4.6 MMOL/L (ref 3.6–5.5)
PROT SERPL-MCNC: 6.9 G/DL (ref 6–8.2)
SODIUM SERPL-SCNC: 137 MMOL/L (ref 135–145)
TRIGL SERPL-MCNC: 287 MG/DL (ref 0–149)

## 2018-01-10 PROCEDURE — 99215 OFFICE O/P EST HI 40 MIN: CPT | Performed by: INTERNAL MEDICINE

## 2018-01-10 ASSESSMENT — ENCOUNTER SYMPTOMS
EYE DISCHARGE: 0
WHEEZING: 0
NAUSEA: 0
MYALGIAS: 0
CHILLS: 0
NECK PAIN: 1
LOSS OF CONSCIOUSNESS: 0
ABDOMINAL PAIN: 0
SPEECH CHANGE: 0
BRUISES/BLEEDS EASILY: 0
COUGH: 0
EYE PAIN: 0
DEPRESSION: 0
NERVOUS/ANXIOUS: 0
FEVER: 0
PALPITATIONS: 0
VOMITING: 0
BLURRED VISION: 0
HEMOPTYSIS: 0
BACK PAIN: 1

## 2018-01-10 NOTE — PROGRESS NOTES
Subjective:   Benoit Barkley is a 52 y.o. male who presents today for f/u CAD    Chief Complaint: follow up stress test result and recent MI    He is seen at the request of .  He has long standing DM and hyperlipidemia.  A month ago, he was admitted with NSTEMI. The main LCX was subtotally occluded. It was successfully stented.  He was also found to have chronic total occlusion of right coronary artery with Left to right collateral.  He underwent stress MPI last week which showed ischemia in that territory.  His ECHO during admission also showed normal motion of the inferior wall.    He has not tried to do much so far. He walks a few miles at slow pace.  He is interested in cardiac rehabilitation but was told to wait for now.    Denies side effect from his medications.    Past Medical History:   Diagnosis Date   • Acute coronary syndrome (CMS-HCC) 12/10/2017   • CAD (coronary artery disease)    • Dyslipidemia 12/8/2017   • NSTEMI (non-ST elevated myocardial infarction) (CMS-HCC) 12/8/2017     Past Surgical History:   Procedure Laterality Date   • CARDIAC CATH  12/09/2017    ALICIA to Circ,  RCA with collaterals.     Family History   Problem Relation Age of Onset   • Heart Disease Mother 50     CABG   • Heart Attack Father    • Heart Disease Maternal Grandmother 42     heart attack     History   Smoking Status   • Never Smoker   Smokeless Tobacco   • Never Used     Allergies   Allergen Reactions   • Ibuprofen      Stomach pains...     Outpatient Encounter Prescriptions as of 1/10/2018   Medication Sig Dispense Refill   • atorvastatin (LIPITOR) 80 MG tablet Take 1 Tab by mouth every evening. 90 Tab 3   • metoprolol (LOPRESSOR) 25 MG Tab Take 1 Tab by mouth 2 times a day. 180 Tab 3   • prasugrel (EFFIENT) 10 MG Tab Take 1 Tab by mouth every day. 90 Tab 3   • lisinopril (PRINIVIL) 10 MG Tab Take 1 Tab by mouth every day. 90 Tab 3   • acetaminophen/caffeine/butalbital 325-40-50 mg (FIORICET) -40 MG Tab Take 1  "Tab by mouth every 6 hours as needed for Headache or Migraine. 30 Tab 1   • albuterol 108 (90 Base) MCG/ACT Aero Soln inhalation aerosol Inhale 2 Puffs by mouth every four hours as needed for Shortness of Breath.     • metformin (GLUCOPHAGE) 1000 MG tablet Take 1,000 mg by mouth 2 times a day, with meals.     • aspirin EC (ECOTRIN) 81 MG Tablet Delayed Response Take 81 mg by mouth every morning.     • Omega-3 Fatty Acids (FISH OIL) 1000 MG Cap capsule Take 1,000 mg by mouth every morning.     • multivitamin (THERAGRAN) Tab Take 1 Tab by mouth every morning.       No facility-administered encounter medications on file as of 1/10/2018.      Review of Systems   Constitutional: Negative for chills and fever.   HENT: Negative for congestion.    Eyes: Negative for blurred vision, pain and discharge.   Respiratory: Negative for cough, hemoptysis and wheezing.    Cardiovascular: Negative for palpitations.   Gastrointestinal: Negative for abdominal pain, nausea and vomiting.   Musculoskeletal: Positive for back pain and neck pain. Negative for joint pain and myalgias.   Skin: Negative for itching and rash.   Neurological: Negative for speech change and loss of consciousness.   Endo/Heme/Allergies: Does not bruise/bleed easily.   Psychiatric/Behavioral: Negative for depression. The patient is not nervous/anxious.    All other systems reviewed and are negative.       Objective:   /78   Pulse 68   Ht 1.93 m (6' 3.98\")   Wt 117 kg (258 lb)   SpO2 95%   BMI 31.42 kg/m²     Physical Exam   Constitutional: He is oriented to person, place, and time. He appears well-developed. No distress.   HENT:   Mouth/Throat: Mucous membranes are normal.   Eyes: Conjunctivae and EOM are normal.   Neck: No JVD present. No tracheal deviation present. No thyroid mass and no thyromegaly present.   Cardiovascular: Normal rate, regular rhythm and intact distal pulses.  Exam reveals distant heart sounds.    No murmur heard.  Pulmonary/Chest: " Effort normal and breath sounds normal. No respiratory distress. He exhibits no tenderness.   Abdominal: Soft. There is no tenderness.   Musculoskeletal: Normal range of motion. He exhibits no edema.   Neurological: He is alert and oriented to person, place, and time. He has normal strength. He displays no tremor.   Skin: Skin is warm and dry. He is not diaphoretic.   Psychiatric: He has a normal mood and affect. His behavior is normal.   Vitals reviewed.    Labs this week BUN/Cr normal, LDL 25, , HDL 32, glucose 203    Assessment:     1. Chronic total occlusion of native coronary artery     2. S/P drug eluting coronary stent placement of left circumflex artery    3. Dyslipidemia     4. Essential hypertension         Medical Decision Making:  Today's Assessment / Status / Plan:     I had a lengthy discussion with him and his wife. He has chronic total occlusion of RCA with evidence of viable but ischemic inferior wall. Given his recent MI in the LCX distribution, I feel that attempt PCI of RCA is advisable.  I did review his coronary angiographic images. I feel that retrograde approach is potentially feasible.  Risks and benefits of the procedure were discussed at length.   The patient and his wife understood, accepted the risks and wishes to proceed.   Will arrange for this in a few weeks.  In the mean time, he may try to gradually increase his activity as tolerated.   He is to continue hs current cardiac medications. May need to adjust DM medication.  We will keep you posted about our findings and further recommendations as they become available.   Please also do not hesitate to call for any questions.  Thank you kindly for allowing me to participate in the care of this patient.

## 2018-01-10 NOTE — TELEPHONE ENCOUNTER
Patient is scheduled on 2-1-18 for a  with Dr. PORTER at [facility]. Patient was told to hold metformin day of procedure and 48hrs after. Patient was told to check in at 6:00am for a 7:30 procedure. H&P was done on 1-10-18 by Dr. PORTER. Pre admit appt is scheduled on 1-29-18 at 7:15 am.

## 2018-01-10 NOTE — LETTER
[facility]   [address], [address]  [place] [address]  Phone: [phone]  Fax: [phone]              Benoit Barkley  1965    Encounter Date: 1/10/2018    Shabnam Roberts M.D.          PROGRESS NOTE:  Subjective:   Benoit Barkley is a 52 y.o. male who presents today for f/u CAD    Chief Complaint: follow up stress test result and recent MI    He is seen at the request of .  He has long standing DM and hyperlipidemia.  A month ago, he was admitted with NSTEMI. The main LCX was subtotally occluded. It was successfully stented.  He was also found to have chronic total occlusion of right coronary artery with Left to right collateral.  He underwent stress MPI last week which showed ischemia in that territory.  His ECHO during admission also showed normal motion of the inferior wall.    He has not tried to do much so far. He walks a few miles at slow pace.  He is interested in cardiac rehabilitation but was told to wait for now.    Denies side effect from his medications.    Past Medical History:   Diagnosis Date   • Acute coronary syndrome (CMS-HCC) 12/10/2017   • CAD (coronary artery disease)    • Dyslipidemia 12/8/2017   • NSTEMI (non-ST elevated myocardial infarction) (CMS-HCC) 12/8/2017     Past Surgical History:   Procedure Laterality Date   • CARDIAC CATH  12/09/2017    ALICIA to Circ,  RCA with collaterals.     Family History   Problem Relation Age of Onset   • Heart Disease Mother 50     CABG   • Heart Attack Father    • Heart Disease Maternal Grandmother 42     heart attack     History   Smoking Status   • Never Smoker   Smokeless Tobacco   • Never Used     Allergies   Allergen Reactions   • Ibuprofen      Stomach pains...     Outpatient Encounter Prescriptions as of 1/10/2018   Medication Sig Dispense Refill   • atorvastatin (LIPITOR) 80 MG tablet Take 1 Tab by mouth every evening. 90 Tab 3   • metoprolol (LOPRESSOR) 25 MG Tab Take 1 Tab by mouth  "2 times a day. 180 Tab 3   • prasugrel (EFFIENT) 10 MG Tab Take 1 Tab by mouth every day. 90 Tab 3   • lisinopril (PRINIVIL) 10 MG Tab Take 1 Tab by mouth every day. 90 Tab 3   • acetaminophen/caffeine/butalbital 325-40-50 mg (FIORICET) -40 MG Tab Take 1 Tab by mouth every 6 hours as needed for Headache or Migraine. 30 Tab 1   • albuterol 108 (90 Base) MCG/ACT Aero Soln inhalation aerosol Inhale 2 Puffs by mouth every four hours as needed for Shortness of Breath.     • metformin (GLUCOPHAGE) 1000 MG tablet Take 1,000 mg by mouth 2 times a day, with meals.     • aspirin EC (ECOTRIN) 81 MG Tablet Delayed Response Take 81 mg by mouth every morning.     • Omega-3 Fatty Acids (FISH OIL) 1000 MG Cap capsule Take 1,000 mg by mouth every morning.     • multivitamin (THERAGRAN) Tab Take 1 Tab by mouth every morning.       No facility-administered encounter medications on file as of 1/10/2018.      Review of Systems   Constitutional: Negative for chills and fever.   HENT: Negative for congestion.    Eyes: Negative for blurred vision, pain and discharge.   Respiratory: Negative for cough, hemoptysis and wheezing.    Cardiovascular: Negative for palpitations.   Gastrointestinal: Negative for abdominal pain, nausea and vomiting.   Musculoskeletal: Positive for back pain and neck pain. Negative for joint pain and myalgias.   Skin: Negative for itching and rash.   Neurological: Negative for speech change and loss of consciousness.   Endo/Heme/Allergies: Does not bruise/bleed easily.   Psychiatric/Behavioral: Negative for depression. The patient is not nervous/anxious.    All other systems reviewed and are negative.       Objective:   /78   Pulse 68   Ht 1.93 m (6' 3.98\")   Wt 117 kg (258 lb)   SpO2 95%   BMI 31.42 kg/m²      Physical Exam   Constitutional: He is oriented to person, place, and time. He appears well-developed. No distress.   HENT:   Mouth/Throat: Mucous membranes are normal.   Eyes: Conjunctivae and " EOM are normal.   Neck: No JVD present. No tracheal deviation present. No thyroid mass and no thyromegaly present.   Cardiovascular: Normal rate, regular rhythm and intact distal pulses.  Exam reveals distant heart sounds.    No murmur heard.  Pulmonary/Chest: Effort normal and breath sounds normal. No respiratory distress. He exhibits no tenderness.   Abdominal: Soft. There is no tenderness.   Musculoskeletal: Normal range of motion. He exhibits no edema.   Neurological: He is alert and oriented to person, place, and time. He has normal strength. He displays no tremor.   Skin: Skin is warm and dry. He is not diaphoretic.   Psychiatric: He has a normal mood and affect. His behavior is normal.   Vitals reviewed.    Labs this week BUN/Cr normal, LDL 25, , HDL 32, glucose 203    Assessment:     1. Chronic total occlusion of native coronary artery     2. S/P drug eluting coronary stent placement of left circumflex artery    3. Dyslipidemia     4. Essential hypertension         Medical Decision Making:  Today's Assessment / Status / Plan:     I had a lengthy discussion with him and his wife. He has chronic total occlusion of RCA with evidence of viable but ischemic inferior wall. Given his recent MI in the LCX distribution, I feel that attempt PCI of RCA is advisable.  I did review his coronary angiographic images. I feel that retrograde approach is potentially feasible.  Risks and benefits of the procedure were discussed at length.   The patient and his wife understood, accepted the risks and wishes to proceed.   Will arrange for this in a few weeks.  In the mean time, he may try to gradually increase his activity as tolerated.   He is to continue hs current cardiac medications. May need to adjust DM medication.  We will keep you posted about our findings and further recommendations as they become available.   Please also do not hesitate to call for any questions.  Thank you kindly for allowing me to participate  in the care of this patient.      No Recipients

## 2018-01-12 ENCOUNTER — TELEPHONE (OUTPATIENT)
Dept: CARDIOLOGY | Facility: MEDICAL CENTER | Age: 53
End: 2018-01-12

## 2018-01-12 NOTE — TELEPHONE ENCOUNTER
----- Message from Nora Eaton sent at 1/12/2018 10:40 AM PST -----  Regarding: Pt not feeling well wants call back   Contact: [phone]  YENIFER/Lauren    Pt is calling to report since yesterday he's been experiencing weakness in left arm, headache and pain going down neck. He would please like a call back at [phone] to find out what to do.

## 2018-01-12 NOTE — TELEPHONE ENCOUNTER
"Patient states his discomforts went away, but his headache came back after walking.  Discussed with CR MD, patient is noted to have neck issues.  Patient states he just had \"dry needling\" , similar to acu puncture done at the base of his skull and his headache and neck pain and left arm weakness may be related.  Advised patient if he worsens he should report to the ER per YENIFER MD.   "

## 2018-01-15 ENCOUNTER — NON-PROVIDER VISIT (OUTPATIENT)
Dept: CARDIOLOGY | Facility: MEDICAL CENTER | Age: 53
End: 2018-01-15

## 2018-01-15 ASSESSMENT — PATIENT HEALTH QUESTIONNAIRE - PHQ9: SUM OF ALL RESPONSES TO PHQ QUESTIONS 1-9: 1

## 2018-01-15 NOTE — PROGRESS NOTES
Cardiac Rehab Individual Treatment Plan Assessment: Discharge (relapse) 01/15/18 Session #     1   MRN: [id number]   Allergies: Ibuprofen   Patient Name: Benoit Barkley : 1965 Risk Stratification: High    Diagnoses: No diagnosis found. Age: 52 y.o. Physician: Regina Jj M.D.    Date of Event: 17 Specialist: Yaz   Risk Factors:  Hypertension, Hyperlipidemia, Diabetes, Family History, Age, Male > 45   Exercise Nutrition Education Psychosocial   Stages of change Stages of change Stages of change Stages of change   Relapse Relapse Relapse Relapse   Fitness Test Lipids Learning Barriers Outcome Survey Tools   DIST: 1216  Available: Yes Learning Barriers: Vision FP QOL Overall Score: 29.48   Max HR: 84 Date: 17 Family Support PHQ-9: 1   RPE: 7 Total: 126 mg/dL Yes Nutrition Screen: 64 %   SPO2: 97 % Tri mg/dL Lives: Spouse Intervention   MET: 3.53 HDL: 28 mg/dL Tobacco Use Behavioral Health Consult: Yes   EF= 65 (2017) LDL:  (na, due to elevated Tg's. ) History   Smoking Status   • Never Smoker   Smokeless Tobacco   • Never Used      Physician Referral: Yes   Ambulatory Status Diabetes Smoking Intervention Identifies Stressors: Yes   Fall Risk Assessed: Yes Diabetes: Yes Smoking Cessation Referral: N/A Drug Intervention: No   Exercise Ambulatory Status Assist Devices: None HbA1C: 8.5 % Date: 17 Ind. Education / Counseling: N/A Education   Exercise Prescription Monitors BS at Home: Yes Tobacco Adjunct: N/A Psychosocial Education: Coping Techniques, Positive Support System, S/S Depression   Mode: Treadmill, NuStep, UBE, Airdyne   Frequency: tries to every day Random BS: 176 Education Intervention Target Goal   Frequency:  3 days/week Weight Management Healthy Heart Education: Class Schedule Given, Medications Reviewed, Patient Education Binder Provided, Risk Factors Discussed, Videos Viewed per Missael Assess presence or absence of depression using a valid screening: Yes   Duration:  " 25-35 min Weight: 117 kg (258 lb) Target Goal Use Stress Management: Yes   Intensity:  11-13 RPE Height: 193 cm (6' 3.98\") Complete Tobacco Cessation: Complete Tobacco Cessation: N/A Adverse Events: Yes   METS:  1.0-3.0 BMI (Calculated): 31.42 Educate / Review and have understanding of cardiac disease prevention: Educate / Review and have understanding of cardiac disease prevention: Yes Unexpected Events: Yes   Progression:  ^ increments of 1-3 to THR/RPE as tolerated Goal weight: 240 Medication Compliance: Yes    THR: THR: Other (see comment) (118-128) History   Alcohol Use No         Angina with Exercise?  Angina with Exercise: No      Resistance Training?  Resistance Training: Yes      Hypertension      Diagnosed with HTN: Yes Intervention      Resting BP: 125/68 Dietician Consult/Class: Pending (scheduled)      Peak Ex BP: 140/84 Nurse/Patient Discussion: Yes     Intervention Diabetes Ed Referral: Yes     Home Exercise:  Yes Discuss Maintenance /Wt Loss: Yes     Mode: Walk Attend Cooking School: Pending (scheduled)     Duration: 20-30 min Dietary Goal: >=58 rate your plate, low sodium, low sugar DM2     Frequency: 7 days active  Education     Education Nutrition Education: S&S Hypo/Hyper glycemia, Relate Diabetes to CAD, Healthy Eating, Sodium Reduction     Equipment Orientation, Exercise Safety, S/S to Report, RPE Scale, Warm Up / Cool Down, Physically Active Target Goal     Target Goal LDL-C < 100 if trig. > 200:  Yes       Start Individual Exercise Rx Yes LDL-C < 70 for high risk patient:  Yes       BP < 140/90 or < 130/80 if DM or CKD Yes Non HDL-C Should be < 130:  Yes       Aerobic activity 30 + min / day 5 days / wk Yes HbA1C < 7%: Yes         BMI < 25: No (<29)       Notes: Patient needs another stent put in 2/1. Patient never came to [facility] after orientation. Discharge at this time.                             "

## 2018-01-17 NOTE — PROGRESS NOTES
Date: 1/16/2018    DIscharge Individual Treatment Plan review for the [facility] Intensive Cardiac Rehabilitation (ICR) Program.    Benoit Barkley, 52 y.o. male, with qualifying diagnosis/diagnoses of S/P Stent.  Co-morbid conditions include Hypertension, Hyperlipidemia, Diabetes, Family History, Age, Male > 45.     Primary Care Provider: Regina Jj M.D.     Heart Specialist (s): Dr. Mukherjee    Patient has successfully completed 1 Exercise Sessions, and an appropriate number of corresponding Education Sessions.    Patient is having a stent placed on 2/1/18.  Patient never returned to [facility] after initial visit, and is being discharged at this time.     Bg Lagos MD, [facility]  , [facility] Intensive Cardiac Rehabilitation (ICR) Program

## 2018-01-29 DIAGNOSIS — Z01.810 PRE-OPERATIVE CARDIOVASCULAR EXAMINATION: ICD-10-CM

## 2018-01-29 DIAGNOSIS — Z01.812 PRE-OPERATIVE LABORATORY EXAMINATION: ICD-10-CM

## 2018-01-29 LAB
ANION GAP SERPL CALC-SCNC: 10 MMOL/L (ref 0–11.9)
BUN SERPL-MCNC: 21 MG/DL (ref 8–22)
CALCIUM SERPL-MCNC: 10.3 MG/DL (ref 8.5–10.5)
CHLORIDE SERPL-SCNC: 101 MMOL/L (ref 96–112)
CO2 SERPL-SCNC: 24 MMOL/L (ref 20–33)
CREAT SERPL-MCNC: 0.79 MG/DL (ref 0.5–1.4)
EKG IMPRESSION: NORMAL
ERYTHROCYTE [DISTWIDTH] IN BLOOD BY AUTOMATED COUNT: 41.7 FL (ref 35.9–50)
GLUCOSE SERPL-MCNC: 236 MG/DL (ref 65–99)
HCT VFR BLD AUTO: 47.8 % (ref 42–52)
HGB BLD-MCNC: 16 G/DL (ref 14–18)
INR PPP: 1.03 (ref 0.87–1.13)
MCH RBC QN AUTO: 29.4 PG (ref 27–33)
MCHC RBC AUTO-ENTMCNC: 33.5 G/DL (ref 33.7–35.3)
MCV RBC AUTO: 87.9 FL (ref 81.4–97.8)
PLATELET # BLD AUTO: 291 K/UL (ref 164–446)
PMV BLD AUTO: 9.8 FL (ref 9–12.9)
POTASSIUM SERPL-SCNC: 4.5 MMOL/L (ref 3.6–5.5)
PROTHROMBIN TIME: 13.2 SEC (ref 12–14.6)
RBC # BLD AUTO: 5.44 M/UL (ref 4.7–6.1)
SODIUM SERPL-SCNC: 135 MMOL/L (ref 135–145)
WBC # BLD AUTO: 7 K/UL (ref 4.8–10.8)

## 2018-01-29 PROCEDURE — 85610 PROTHROMBIN TIME: CPT

## 2018-01-29 PROCEDURE — 80048 BASIC METABOLIC PNL TOTAL CA: CPT

## 2018-01-29 PROCEDURE — 85027 COMPLETE CBC AUTOMATED: CPT

## 2018-01-29 PROCEDURE — 93005 ELECTROCARDIOGRAM TRACING: CPT

## 2018-01-29 PROCEDURE — 93010 ELECTROCARDIOGRAM REPORT: CPT | Performed by: INTERNAL MEDICINE

## 2018-01-29 PROCEDURE — 36415 COLL VENOUS BLD VENIPUNCTURE: CPT

## 2018-01-29 RX ORDER — LISINOPRIL 10 MG/1
10 TABLET ORAL
COMMUNITY
End: 2018-02-16 | Stop reason: SINTOL

## 2018-01-29 RX ORDER — PRASUGREL 10 MG/1
10 TABLET, FILM COATED ORAL EVERY MORNING
COMMUNITY
End: 2021-08-12

## 2018-01-29 RX ORDER — ACETAMINOPHEN 325 MG/1
650 TABLET ORAL EVERY 4 HOURS PRN
COMMUNITY

## 2018-02-01 ENCOUNTER — HOSPITAL ENCOUNTER (INPATIENT)
Facility: MEDICAL CENTER | Age: 53
LOS: 1 days | DRG: 247 | End: 2018-02-02
Attending: INTERNAL MEDICINE | Admitting: INTERNAL MEDICINE
Payer: COMMERCIAL

## 2018-02-01 ENCOUNTER — APPOINTMENT (OUTPATIENT)
Dept: RADIOLOGY | Facility: MEDICAL CENTER | Age: 53
DRG: 247 | End: 2018-02-01
Attending: INTERNAL MEDICINE
Payer: COMMERCIAL

## 2018-02-01 PROBLEM — I25.82 COMPLETE OCCLUSION OF CORONARY ARTERY, CHRONIC: Status: ACTIVE | Noted: 2018-02-01

## 2018-02-01 LAB
ACT BLD: 224 SEC (ref 74–137)
ACT BLD: 241 SEC (ref 74–137)
ACT BLD: 285 SEC (ref 74–137)
ACT BLD: 318 SEC (ref 74–137)
ACT BLD: 588 SEC (ref 74–137)
EKG IMPRESSION: NORMAL
GLUCOSE BLD-MCNC: 160 MG/DL (ref 65–99)
GLUCOSE BLD-MCNC: 178 MG/DL (ref 65–99)
GLUCOSE BLD-MCNC: 321 MG/DL (ref 65–99)

## 2018-02-01 PROCEDURE — 99152 MOD SED SAME PHYS/QHP 5/>YRS: CPT

## 2018-02-01 PROCEDURE — 85347 COAGULATION TIME ACTIVATED: CPT | Mod: 91

## 2018-02-01 PROCEDURE — 700102 HCHG RX REV CODE 250 W/ 637 OVERRIDE(OP): Performed by: INTERNAL MEDICINE

## 2018-02-01 PROCEDURE — 700111 HCHG RX REV CODE 636 W/ 250 OVERRIDE (IP): Performed by: INTERNAL MEDICINE

## 2018-02-01 PROCEDURE — C1894 INTRO/SHEATH, NON-LASER: HCPCS

## 2018-02-01 PROCEDURE — C1725 CATH, TRANSLUMIN NON-LASER: HCPCS

## 2018-02-01 PROCEDURE — B2111ZZ FLUOROSCOPY OF MULTIPLE CORONARY ARTERIES USING LOW OSMOLAR CONTRAST: ICD-10-PCS | Performed by: INTERNAL MEDICINE

## 2018-02-01 PROCEDURE — 93005 ELECTROCARDIOGRAM TRACING: CPT | Performed by: INTERNAL MEDICINE

## 2018-02-01 PROCEDURE — C1769 GUIDE WIRE: HCPCS

## 2018-02-01 PROCEDURE — 360980 HCHG SINGLE TRANSDUCER

## 2018-02-01 PROCEDURE — C1874 STENT, COATED/COV W/DEL SYS: HCPCS

## 2018-02-01 PROCEDURE — C1887 CATHETER, GUIDING: HCPCS

## 2018-02-01 PROCEDURE — A9270 NON-COVERED ITEM OR SERVICE: HCPCS

## 2018-02-01 PROCEDURE — 93308 TTE F-UP OR LMTD: CPT | Mod: 26 | Performed by: INTERNAL MEDICINE

## 2018-02-01 PROCEDURE — 71045 X-RAY EXAM CHEST 1 VIEW: CPT

## 2018-02-01 PROCEDURE — 304952 HCHG R 2 PADS

## 2018-02-01 PROCEDURE — 0271366 DILATION OF CORONARY ARTERY, TWO ARTERIES, BIFURCATION, WITH THREE DRUG-ELUTING INTRALUMINAL DEVICES, PERCUTANEOUS APPROACH: ICD-10-PCS | Performed by: INTERNAL MEDICINE

## 2018-02-01 PROCEDURE — C9607 PERC D-E COR REVASC CHRO SIN: HCPCS | Mod: RC

## 2018-02-01 PROCEDURE — 700101 HCHG RX REV CODE 250

## 2018-02-01 PROCEDURE — 93010 ELECTROCARDIOGRAM REPORT: CPT | Performed by: INTERNAL MEDICINE

## 2018-02-01 PROCEDURE — 99153 MOD SED SAME PHYS/QHP EA: CPT

## 2018-02-01 PROCEDURE — 82962 GLUCOSE BLOOD TEST: CPT | Mod: 91

## 2018-02-01 PROCEDURE — C9608 PERC D-E COR REVASC CHRO ADD: HCPCS | Mod: RC

## 2018-02-01 PROCEDURE — C1760 CLOSURE DEV, VASC: HCPCS

## 2018-02-01 PROCEDURE — 700111 HCHG RX REV CODE 636 W/ 250 OVERRIDE (IP)

## 2018-02-01 PROCEDURE — 93308 TTE F-UP OR LMTD: CPT

## 2018-02-01 PROCEDURE — 770022 HCHG ROOM/CARE - ICU (200)

## 2018-02-01 PROCEDURE — A9270 NON-COVERED ITEM OR SERVICE: HCPCS | Performed by: INTERNAL MEDICINE

## 2018-02-01 PROCEDURE — 93454 CORONARY ARTERY ANGIO S&I: CPT

## 2018-02-01 PROCEDURE — 700102 HCHG RX REV CODE 250 W/ 637 OVERRIDE(OP)

## 2018-02-01 RX ORDER — MORPHINE SULFATE 4 MG/ML
2-4 INJECTION, SOLUTION INTRAMUSCULAR; INTRAVENOUS
Status: DISCONTINUED | OUTPATIENT
Start: 2018-02-01 | End: 2018-02-02 | Stop reason: HOSPADM

## 2018-02-01 RX ORDER — DEXAMETHASONE SODIUM PHOSPHATE 4 MG/ML
4 INJECTION, SOLUTION INTRA-ARTICULAR; INTRALESIONAL; INTRAMUSCULAR; INTRAVENOUS; SOFT TISSUE
Status: DISCONTINUED | OUTPATIENT
Start: 2018-02-01 | End: 2018-02-02 | Stop reason: HOSPADM

## 2018-02-01 RX ORDER — MORPHINE SULFATE 4 MG/ML
2-5 INJECTION, SOLUTION INTRAMUSCULAR; INTRAVENOUS
Status: DISCONTINUED | OUTPATIENT
Start: 2018-02-01 | End: 2018-02-01

## 2018-02-01 RX ORDER — ACETAMINOPHEN 325 MG/1
650 TABLET ORAL EVERY 6 HOURS PRN
Status: DISCONTINUED | OUTPATIENT
Start: 2018-02-01 | End: 2018-02-02 | Stop reason: HOSPADM

## 2018-02-01 RX ORDER — HEPARIN SODIUM,PORCINE 1000/ML
VIAL (ML) INJECTION
Status: COMPLETED
Start: 2018-02-01 | End: 2018-02-01

## 2018-02-01 RX ORDER — DIPHENHYDRAMINE HYDROCHLORIDE 50 MG/ML
25 INJECTION INTRAMUSCULAR; INTRAVENOUS EVERY 6 HOURS PRN
Status: DISCONTINUED | OUTPATIENT
Start: 2018-02-01 | End: 2018-02-02 | Stop reason: HOSPADM

## 2018-02-01 RX ORDER — PRASUGREL 10 MG/1
10 TABLET, FILM COATED ORAL DAILY
Status: DISCONTINUED | OUTPATIENT
Start: 2018-02-02 | End: 2018-02-02 | Stop reason: HOSPADM

## 2018-02-01 RX ORDER — LIDOCAINE HYDROCHLORIDE 20 MG/ML
INJECTION, SOLUTION INFILTRATION; PERINEURAL
Status: COMPLETED
Start: 2018-02-01 | End: 2018-02-01

## 2018-02-01 RX ORDER — ONDANSETRON 2 MG/ML
4 INJECTION INTRAMUSCULAR; INTRAVENOUS EVERY 4 HOURS PRN
Status: DISCONTINUED | OUTPATIENT
Start: 2018-02-01 | End: 2018-02-02 | Stop reason: HOSPADM

## 2018-02-01 RX ORDER — MORPHINE SULFATE 10 MG/ML
5 INJECTION, SOLUTION INTRAMUSCULAR; INTRAVENOUS
Status: DISCONTINUED | OUTPATIENT
Start: 2018-02-01 | End: 2018-02-02 | Stop reason: HOSPADM

## 2018-02-01 RX ORDER — MIDAZOLAM HYDROCHLORIDE 1 MG/ML
INJECTION INTRAMUSCULAR; INTRAVENOUS
Status: COMPLETED
Start: 2018-02-01 | End: 2018-02-01

## 2018-02-01 RX ORDER — SCOLOPAMINE TRANSDERMAL SYSTEM 1 MG/1
1 PATCH, EXTENDED RELEASE TRANSDERMAL
Status: DISCONTINUED | OUTPATIENT
Start: 2018-02-01 | End: 2018-02-02 | Stop reason: HOSPADM

## 2018-02-01 RX ORDER — DEXTROSE MONOHYDRATE 25 G/50ML
25 INJECTION, SOLUTION INTRAVENOUS
Status: DISCONTINUED | OUTPATIENT
Start: 2018-02-01 | End: 2018-02-02 | Stop reason: HOSPADM

## 2018-02-01 RX ORDER — BUTALBITAL, ACETAMINOPHEN AND CAFFEINE 50; 325; 40 MG/1; MG/1; MG/1
1 TABLET ORAL EVERY 4 HOURS PRN
Status: DISCONTINUED | OUTPATIENT
Start: 2018-02-01 | End: 2018-02-02 | Stop reason: HOSPADM

## 2018-02-01 RX ORDER — SODIUM CHLORIDE 9 MG/ML
INJECTION, SOLUTION INTRAVENOUS
Status: DISCONTINUED | OUTPATIENT
Start: 2018-02-01 | End: 2018-02-02 | Stop reason: HOSPADM

## 2018-02-01 RX ORDER — DIPHENHYDRAMINE HYDROCHLORIDE 50 MG/ML
INJECTION INTRAMUSCULAR; INTRAVENOUS
Status: COMPLETED
Start: 2018-02-01 | End: 2018-02-01

## 2018-02-01 RX ORDER — HALOPERIDOL 5 MG/ML
1 INJECTION INTRAMUSCULAR EVERY 6 HOURS PRN
Status: DISCONTINUED | OUTPATIENT
Start: 2018-02-01 | End: 2018-02-02 | Stop reason: HOSPADM

## 2018-02-01 RX ORDER — SODIUM CHLORIDE 9 MG/ML
INJECTION, SOLUTION INTRAVENOUS
Status: ACTIVE
Start: 2018-02-01 | End: 2018-02-02

## 2018-02-01 RX ORDER — ATORVASTATIN CALCIUM 80 MG/1
80 TABLET, FILM COATED ORAL EVERY EVENING
Status: DISCONTINUED | OUTPATIENT
Start: 2018-02-01 | End: 2018-02-02 | Stop reason: HOSPADM

## 2018-02-01 RX ORDER — PROTAMINE SULFATE 10 MG/ML
INJECTION, SOLUTION INTRAVENOUS
Status: COMPLETED
Start: 2018-02-01 | End: 2018-02-01

## 2018-02-01 RX ADMIN — PROTAMINE SULFATE 30 MG: 10 INJECTION, SOLUTION INTRAVENOUS at 12:29

## 2018-02-01 RX ADMIN — METOPROLOL TARTRATE 12.5 MG: 25 TABLET, FILM COATED ORAL at 19:43

## 2018-02-01 RX ADMIN — ATORVASTATIN CALCIUM 80 MG: 80 TABLET, FILM COATED ORAL at 19:43

## 2018-02-01 RX ADMIN — MIDAZOLAM 2 MG: 1 INJECTION INTRAMUSCULAR; INTRAVENOUS at 09:41

## 2018-02-01 RX ADMIN — LIDOCAINE HYDROCHLORIDE: 20 INJECTION, SOLUTION INFILTRATION; PERINEURAL at 08:51

## 2018-02-01 RX ADMIN — INSULIN HUMAN 4 UNITS: 100 INJECTION, SOLUTION PARENTERAL at 23:17

## 2018-02-01 RX ADMIN — HEPARIN SODIUM: 1000 INJECTION, SOLUTION INTRAVENOUS; SUBCUTANEOUS at 09:40

## 2018-02-01 RX ADMIN — HEPARIN SODIUM: 1000 INJECTION, SOLUTION INTRAVENOUS; SUBCUTANEOUS at 08:51

## 2018-02-01 RX ADMIN — ASPIRIN 325 MG: 325 TABLET, DELAYED RELEASE ORAL at 06:50

## 2018-02-01 RX ADMIN — MIDAZOLAM 2 MG: 1 INJECTION INTRAMUSCULAR; INTRAVENOUS at 12:00

## 2018-02-01 RX ADMIN — DIPHENHYDRAMINE HYDROCHLORIDE 50 MG: 50 INJECTION INTRAMUSCULAR; INTRAVENOUS at 09:40

## 2018-02-01 RX ADMIN — HEPARIN SODIUM: 1000 INJECTION, SOLUTION INTRAVENOUS; SUBCUTANEOUS at 12:01

## 2018-02-01 RX ADMIN — FENTANYL CITRATE 100 MCG: 50 INJECTION, SOLUTION INTRAMUSCULAR; INTRAVENOUS at 12:00

## 2018-02-01 RX ADMIN — HEPARIN SODIUM 2000 UNITS: 200 INJECTION, SOLUTION INTRAVENOUS at 09:40

## 2018-02-01 RX ADMIN — FENTANYL CITRATE 100 MCG: 50 INJECTION, SOLUTION INTRAMUSCULAR; INTRAVENOUS at 09:41

## 2018-02-01 RX ADMIN — SODIUM CHLORIDE: 9 INJECTION, SOLUTION INTRAVENOUS at 06:54

## 2018-02-01 RX ADMIN — ONDANSETRON 4 MG: 2 INJECTION INTRAMUSCULAR; INTRAVENOUS at 15:56

## 2018-02-01 RX ADMIN — MIDAZOLAM 2 MG: 1 INJECTION INTRAMUSCULAR; INTRAVENOUS at 08:52

## 2018-02-01 RX ADMIN — FENTANYL CITRATE 100 MCG: 50 INJECTION, SOLUTION INTRAMUSCULAR; INTRAVENOUS at 12:29

## 2018-02-01 RX ADMIN — HEPARIN SODIUM 2000 UNITS: 200 INJECTION, SOLUTION INTRAVENOUS at 08:52

## 2018-02-01 RX ADMIN — INSULIN HUMAN 1 UNITS: 100 INJECTION, SOLUTION PARENTERAL at 18:41

## 2018-02-01 RX ADMIN — MIDAZOLAM 2 MG: 1 INJECTION INTRAMUSCULAR; INTRAVENOUS at 10:42

## 2018-02-01 RX ADMIN — LIDOCAINE HYDROCHLORIDE: 20 INJECTION, SOLUTION INFILTRATION; PERINEURAL at 12:29

## 2018-02-01 RX ADMIN — NITROGLYCERIN 10 ML: 20 INJECTION INTRAVENOUS at 08:51

## 2018-02-01 RX ADMIN — FENTANYL CITRATE 100 MCG: 50 INJECTION, SOLUTION INTRAMUSCULAR; INTRAVENOUS at 08:52

## 2018-02-01 ASSESSMENT — LIFESTYLE VARIABLES
EVER_SMOKED: NEVER
DO YOU DRINK ALCOHOL: NO
EVER_SMOKED: NEVER
ALCOHOL_USE: NO

## 2018-02-01 ASSESSMENT — PAIN SCALES - GENERAL
PAINLEVEL_OUTOF10: 0

## 2018-02-01 NOTE — CATH LAB
Immediate Post-Operative Note      PreOp Diagnosis: Angina with  RCA and inferior wall ischemia    PostOp Diagnosis: s/p stenting of RCA with 2.8x16 stent graft, 2.5x24 and 2.24x24 Synergy ALICIA  No sig residual stenosis, MARCUS III flow, small extravasation from small acute marginal branch    Procedure(s) :  Coronary Angiography  PCI RCA     Surgeon(s):  Shabnam Roberts M.D.    Type of Anesthesia: Moderate Sedation    Specimen: None    Estimated Blood Loss: 20 cc's      Findings: Pre: long  prox to mid RCA at first small acute marginal                  Post: no residual stenosis with MARCUS III flow and small extravasation from marginal                          Branch, no pericardial effusion seen on bedside echo, hemodynamically stable          Shabnam Roberts M.D.  2/1/2018 12:43 PM

## 2018-02-01 NOTE — CONSULTS
Pulmonary & Critical Care Consult Note    DATE OF CONSULTATION:  2/1/2018     REFERRING PHYSICIAN:  Shabnam Roberts M.D.     CONSULTANT:  Luisito Lambert DO     REASON FOR CONSULTATION: Critical care management assist     HISTORY OF PRESENT ILLNESS: Mr. Barkley is a 52-year-old male with a past medical history of coronary artery disease, diabetes, dyslipidemia, hypertension. Mr. Barkley underwent a coronary angiography today for total occlusion of the right coronary artery, he had 3 drug-eluting stents placed to the RCA, at the end of the case the patient was noted to have a small extravasation from the marginal branch. He had a bedside echocardiogram which did not show significant pericardial effusion and is admitted to the cardiac intensive care unit. At this time the patient denies chest pain, shortness of breath, palpitations, abdominal pain, groin pain of bleeding.     PAST MEDICAL HISTORY:  Past Medical History:   Diagnosis Date   • Acute coronary syndrome (CMS-HCC) 12/10/2017   • Arthritis    • Asthma    • CAD (coronary artery disease)    • Diabetes (CMS-HCC)     oral medication   • Dyslipidemia 12/8/2017   • Hypertension    • NSTEMI (non-ST elevated myocardial infarction) (CMS-HCC) 12/8/2017   • Pain         PAST SURGICAL HISTORY:   Past Surgical History:   Procedure Laterality Date   • CARDIAC CATH  12/09/2017    ALICIA to Circ,  RCA with collaterals.   • ANKLE ARTHROSCOPY Right    • ARTHROSCOPY, KNEE Bilateral    • HAND SURGERY Right    • HIP ARTHROPLASTY TOTAL Left    • HIP ARTHROSCOPY Left    • NERVE ULNAR TRANSFER Right    • OTHER      neck   • OTHER Bilateral     2 left shoulder surgeries 3 right shoulder surgeries   • OTHER      lower back        ALLERGIES:    Ibuprofen     MEDICATIONS PRIOR TO ADMISSION:   No current facility-administered medications on file prior to encounter.      Current Outpatient Prescriptions on File Prior to Encounter   Medication Sig Dispense Refill   • atorvastatin (LIPITOR)  80 MG tablet Take 1 Tab by mouth every evening. 90 Tab 3   • metoprolol (LOPRESSOR) 25 MG Tab Take 1 Tab by mouth 2 times a day. 180 Tab 3   • acetaminophen/caffeine/butalbital 325-40-50 mg (FIORICET) -40 MG Tab Take 1 Tab by mouth every 6 hours as needed for Headache or Migraine. 30 Tab 1   • albuterol 108 (90 Base) MCG/ACT Aero Soln inhalation aerosol Inhale 2 Puffs by mouth every four hours as needed for Shortness of Breath.     • metformin (GLUCOPHAGE) 1000 MG tablet Take 1,000 mg by mouth 2 times a day, with meals.     • aspirin EC (ECOTRIN) 81 MG Tablet Delayed Response Take 81 mg by mouth every bedtime.     • multivitamin (THERAGRAN) Tab Take 1 Tab by mouth every morning.         SOCIAL HISTORY:   Social History     Social History   • Marital status:      Spouse name: N/A   • Number of children: N/A   • Years of education: N/A     Occupational History   • Not on file.     Social History Main Topics   • Smoking status: Never Smoker   • Smokeless tobacco: Never Used   • Alcohol use No   • Drug use: Yes      Comment: CBD oil topical, THC    • Sexual activity: Not on file     Other Topics Concern   • Not on file     Social History Narrative   • No narrative on file       FAMILY HISTORY:  Family History   Problem Relation Age of Onset   • Heart Disease Mother 50     CABG   • Heart Attack Father    • Heart Disease Maternal Grandmother 42     heart attack        REVIEW OF SYSTEMS:   Constitutional: No fever, no chills,  Respiratory: No cough, no hemoptysis, no dyspnea  Cardiovascular: No chest pain, no palpitations, no orthopnea, no PND, no lower extremity swelling  GI: No nausea, no vomiting, no abdominal pain, no diarrhea, no constipation, no hematochezia, no melena  : no dysuria, no frequency, no urgency  Endocrine: No polyuria, no polydipsia, no hair loss  Hematology: No easy bruising, no bleeding  Musculoskeletal: No joint swelling, no joint erythema, no arthralgia, no myalgias  Neuro: No  "seizures, no numbness, no tingling sensation, no extremity weakness, no change in vision.  Psychiatry: no depression, no suicidal ideation     PHYSICAL EXAMINATION:  /69   Pulse 66   Temp 36.4 °C (97.6 °F)   Resp 12   Ht 1.93 m (6' 4\")   Wt 115.9 kg (255 lb 8.2 oz)   SpO2 98%   BMI 31.10 kg/m²   GENERAL: Well-nourished, well-developed, age-appropriate appearing male  HEENT: Normal cephalic, atraumatic, PERRL, EOMI, external ears normal, external nose normal  NECK: Normal cephalic, atraumatic, PERRL, EOMI, external ears normal, external nose normal, moist mucosal membranes  PULM: Clear to auscultation bilaterally  CVS: Regular rate and rhythm  ABDOMEN: Soft, nontender, nondistended, no rebound, guarding  EXTREMITIES: Dressing in place, no clubbing, no cyanosis, no edema, Cool skin in the distal legs which patient states is chronic, brisk cap refill.  SKIN: Pink, no cyanosis, no edema  NEURO: Nonfocal    LABORATORY DATA:    Lab Results   Component Value Date/Time    WBC 7.0 01/29/2018 07:50 AM    RBC 5.44 01/29/2018 07:50 AM    HEMOGLOBIN 16.0 01/29/2018 07:50 AM    HEMATOCRIT 47.8 01/29/2018 07:50 AM    MCV 87.9 01/29/2018 07:50 AM    MCH 29.4 01/29/2018 07:50 AM    MCHC 33.5 (L) 01/29/2018 07:50 AM    MPV 9.8 01/29/2018 07:50 AM    NEUTSPOLYS 55.10 12/10/2017 03:27 AM    LYMPHOCYTES 34.70 12/10/2017 03:27 AM    MONOCYTES 7.60 12/10/2017 03:27 AM    EOSINOPHILS 1.80 12/10/2017 03:27 AM    BASOPHILS 0.60 12/10/2017 03:27 AM      Lab Results   Component Value Date/Time    SODIUM 135 01/29/2018 07:50 AM    POTASSIUM 4.5 01/29/2018 07:50 AM    CHLORIDE 101 01/29/2018 07:50 AM    CO2 24 01/29/2018 07:50 AM    GLUCOSE 236 (H) 01/29/2018 07:50 AM    BUN 21 01/29/2018 07:50 AM    CREATININE 0.79 01/29/2018 07:50 AM      Lab Results   Component Value Date/Time    PROTHROMBTM 13.2 01/29/2018 07:50 AM    INR 1.03 01/29/2018 07:50 AM         IMAGING:   CXR (personally reviewed)  ECHOCARDIOGRAM LTD W/O CONT   Final " Result        ASSESSMENT/PLAN:  Marginal branch extravasation following PCI   - CICU admission   - Close hemodynamic monitoring   - Echocardiogram, stat echocardiogram for any hemodynamic instability to r/o tamponade   - Tele    CAD   - DAPT, statin, B-blocker    DM   - SSI, Accuchecks    HTN   - B-blocker, ACE-I when able    HLP   - statin    Prophylaxis: SCDs    Patient is critically ill at this time.  I have spent 32 minutes examining this patient, all lab data, x-ray, and discussion with RN, RT. Critical care time: 32 min. No time overlap. Procedures not included in time. Thank you for asking me to consult on the patient.  I appreciate the opportunity to assist in their care and will follow along closely with you.    Luisito Lambert, DO  Critical Care Medicine    This dictation was created using voice recognition software. The accuracy of the dictation is limited to the abilities of the software. Errors of grammar and possibly content are to be expected.

## 2018-02-02 ENCOUNTER — PATIENT OUTREACH (OUTPATIENT)
Dept: HEALTH INFORMATION MANAGEMENT | Facility: OTHER | Age: 53
End: 2018-02-02

## 2018-02-02 VITALS
RESPIRATION RATE: 16 BRPM | BODY MASS INDEX: 30.87 KG/M2 | DIASTOLIC BLOOD PRESSURE: 69 MMHG | SYSTOLIC BLOOD PRESSURE: 114 MMHG | WEIGHT: 253.53 LBS | OXYGEN SATURATION: 94 % | TEMPERATURE: 97.6 F | HEART RATE: 74 BPM | HEIGHT: 76 IN

## 2018-02-02 LAB
ANION GAP SERPL CALC-SCNC: 7 MMOL/L (ref 0–11.9)
BUN SERPL-MCNC: 14 MG/DL (ref 8–22)
CALCIUM SERPL-MCNC: 9 MG/DL (ref 8.5–10.5)
CHLORIDE SERPL-SCNC: 105 MMOL/L (ref 96–112)
CO2 SERPL-SCNC: 24 MMOL/L (ref 20–33)
CREAT SERPL-MCNC: 0.75 MG/DL (ref 0.5–1.4)
EKG IMPRESSION: NORMAL
ERYTHROCYTE [DISTWIDTH] IN BLOOD BY AUTOMATED COUNT: 42.3 FL (ref 35.9–50)
GLUCOSE BLD-MCNC: 202 MG/DL (ref 65–99)
GLUCOSE SERPL-MCNC: 209 MG/DL (ref 65–99)
HCT VFR BLD AUTO: 42.4 % (ref 42–52)
HGB BLD-MCNC: 14.1 G/DL (ref 14–18)
LV EJECT FRACT  99904: 60
MCH RBC QN AUTO: 29.3 PG (ref 27–33)
MCHC RBC AUTO-ENTMCNC: 33.3 G/DL (ref 33.7–35.3)
MCV RBC AUTO: 88.1 FL (ref 81.4–97.8)
PLATELET # BLD AUTO: 245 K/UL (ref 164–446)
PMV BLD AUTO: 9.7 FL (ref 9–12.9)
POTASSIUM SERPL-SCNC: 4.4 MMOL/L (ref 3.6–5.5)
RBC # BLD AUTO: 4.81 M/UL (ref 4.7–6.1)
SODIUM SERPL-SCNC: 136 MMOL/L (ref 135–145)
WBC # BLD AUTO: 9 K/UL (ref 4.8–10.8)

## 2018-02-02 PROCEDURE — 85027 COMPLETE CBC AUTOMATED: CPT

## 2018-02-02 PROCEDURE — 93308 TTE F-UP OR LMTD: CPT | Mod: 26 | Performed by: INTERNAL MEDICINE

## 2018-02-02 PROCEDURE — 80048 BASIC METABOLIC PNL TOTAL CA: CPT

## 2018-02-02 PROCEDURE — 93308 TTE F-UP OR LMTD: CPT

## 2018-02-02 PROCEDURE — 97535 SELF CARE MNGMENT TRAINING: CPT

## 2018-02-02 PROCEDURE — 93010 ELECTROCARDIOGRAM REPORT: CPT | Performed by: INTERNAL MEDICINE

## 2018-02-02 PROCEDURE — 82962 GLUCOSE BLOOD TEST: CPT

## 2018-02-02 PROCEDURE — 93325 DOPPLER ECHO COLOR FLOW MAPG: CPT

## 2018-02-02 PROCEDURE — 93321 DOPPLER ECHO F-UP/LMTD STD: CPT

## 2018-02-02 PROCEDURE — 700102 HCHG RX REV CODE 250 W/ 637 OVERRIDE(OP): Performed by: INTERNAL MEDICINE

## 2018-02-02 PROCEDURE — 93005 ELECTROCARDIOGRAM TRACING: CPT | Performed by: INTERNAL MEDICINE

## 2018-02-02 PROCEDURE — A9270 NON-COVERED ITEM OR SERVICE: HCPCS | Performed by: INTERNAL MEDICINE

## 2018-02-02 RX ADMIN — PRASUGREL HYDROCHLORIDE 10 MG: 10 TABLET, FILM COATED ORAL at 08:51

## 2018-02-02 RX ADMIN — ASPIRIN 81 MG: 81 TABLET, COATED ORAL at 09:00

## 2018-02-02 RX ADMIN — INSULIN HUMAN 2 UNITS: 100 INJECTION, SOLUTION PARENTERAL at 05:15

## 2018-02-02 RX ADMIN — METOPROLOL TARTRATE 12.5 MG: 25 TABLET, FILM COATED ORAL at 08:51

## 2018-02-02 ASSESSMENT — ENCOUNTER SYMPTOMS
NAUSEA: 0
COUGH: 0
FOCAL WEAKNESS: 0
VOMITING: 0
MYALGIAS: 0
SENSORY CHANGE: 0
FEVER: 0
CHILLS: 0
SPUTUM PRODUCTION: 0
BACK PAIN: 1
ABDOMINAL PAIN: 0
DIZZINESS: 0
SHORTNESS OF BREATH: 0
STRIDOR: 0

## 2018-02-02 ASSESSMENT — PAIN SCALES - GENERAL
PAINLEVEL_OUTOF10: 0

## 2018-02-02 ASSESSMENT — LIFESTYLE VARIABLES: EVER_SMOKED: NEVER

## 2018-02-02 NOTE — CARE PLAN
Problem: Hemodynamic Status  Goal: Vital Signs and Fluid Balance Management  Outcome: PROGRESSING AS EXPECTED  Pt connected to monitor at all times. Will assess for cardiac tamponade.   Intervention: Hemodynamic Monitoring  BP maintained above 100 systolic.   Intervention: Assess peripheral pulses and capillary refill  Pedal pulses assessed Q15 to 1 hour with fem stop in place. And after bilat femoral cath sites.

## 2018-02-02 NOTE — PROGRESS NOTES
02/02/2018 1044 - Discharge Outreach - received VM from patient - returned call. States he gave them his cardiac stent card and did not get them back at discharge. Transferred to floor.

## 2018-02-02 NOTE — DISCHARGE INSTRUCTIONS
Discharge Instructions    Discharged to home by car with relative. Discharged via walking, hospital escort: Yes.  Special equipment needed: Not Applicable    Be sure to schedule a follow-up appointment with your primary care doctor or any specialists as instructed.     Discharge Plan:   Influenza Vaccine Indication: Not indicated: Previously immunized this influenza season and > 8 years of age    I understand that a diet low in cholesterol, fat, and sodium is recommended for good health. Unless I have been given specific instructions below for another diet, I accept this instruction as my diet prescription.   Other diet: Regular    Special Instructions: Diagnosis:  Acute Coronary Syndrome (ACS) is a diagnosis that encompasses cardiac-related chest pain and heart attack. ACS occurs when the blood flow to the heart muscle is severely reduced or cut off completely due to a slow process called atherosclerosis.  Atherosclerosis is a disease in which the coronary arteries become narrow from a buildup of fat, cholesterol, and other substances that combine to form plaque. If the plaque breaks, a blood clot will form and block the blood flow to the heart muscle. This lack of blood flow can cause damage or death to the heart muscle which is called a heart attack or Myocardial Infarction (MI). There are two different types of MIs:  ST Elevation Myocardial Infarction or STEMI (the most severe type of heart attack) and Non-ST Elevation Myocardial Infarction or NSTEMI.    Treatment Plan:  · Cardiac Diet  - Low fat, low salt, low cholesterol   · Cardiac Rehab  - Your doctor has ordered you a referral to [facility].  Call [phone] to schedule an appointment.  · Attend my follow-up appointment with my Cardiologist.  · Take my medications as prescribed by my doctor  · Exercise daily  · lower my blood pressure    Medications:  Certain medications are used to treat ACS.  Remember to always take medications as prescribed and never  stop talking medications unless told by your doctor.    You have been prescribed the following medicatons:    Aspirin - Aspirin is used as a blood thinning medication and you will require this medication indefinitely.  Anti-platelet/blood thinner - Your Anti-platelet/Blood thinning medication is called Prasugrel, and is used in combination with aspirin to prevent clots from forming in your heart and/or around your stent.  Your doctor will determine how long you need to be on this medicine.    Is patient discharged on Warfarin / Coumadin?   No     Prasugrel oral tablets  What is this medicine?  PRASUGREL (PRA ashley grel) helps to prevent blood clots. This medicine is used to prevent heart attack, stroke, or other vascular events in people who are at high risk.  This medicine may be used for other purposes; ask your health care provider or pharmacist if you have questions.  COMMON BRAND NAME(S): Effient  What should I tell my health care provider before I take this medicine?  They need to know if you have any of these conditions:  -bleeding disorders  -kidney disease  -liver disease  -recent trauma or surgery  -stomach or intestinal ulcers  -stroke or transient ischemic attack  -an unusual or allergic reaction to prasugrel, other medicines, foods, dyes, or preservatives  -pregnant or trying to get pregnant  -breast-feeding  How should I use this medicine?  Take this medicine by mouth with a drink of water. Follow the directions on the prescription label. You may take this medicine with or without food. If it upsets your stomach, take it with food. This medicine may be chewed or it may be crushed and put into food or liquids such as applesauce, juice, or water as long as it is taken immediately. This medicine has a bitter taste that you may notice if it is chewed or crushed. Take your medicine at regular intervals. Do not take your medicine more often than directed. Do not stop taking except on your doctor's  advice.  Talk to your pediatrician regarding the use of this medicine in children. Special care may be needed.  Overdosage: If you think you have taken too much of this medicine contact a poison control center or emergency room at once.  NOTE: This medicine is only for you. Do not share this medicine with others.  What if I miss a dose?  If you miss a dose, take it as soon as you can. If it is almost time for your next dose, take only that dose. Do not take double or extra doses.  What may interact with this medicine?  -aspirin  -certain medicines that treat or prevent blood clots like warfarin, enoxaparin, and dalteparin  -NSAIDS, medicines for pain and inflammation, like ibuprofen or naproxen  This list may not describe all possible interactions. Give your health care provider a list of all the medicines, herbs, non-prescription drugs, or dietary supplements you use. Also tell them if you smoke, drink alcohol, or use illegal drugs. Some items may interact with your medicine.  What should I watch for while using this medicine?  Visit your doctor or health care professional for regular check ups. Do not stop taking your medicine unless your doctor tells you to.  If you are going to have surgery or dental work, tell your doctor or health care professional that you are taking this medicine.  What side effects may I notice from receiving this medicine?  Side effects that you should report to your doctor or health care professional as soon as possible:  -allergic reactions like skin rash, itching or hives, swelling of the face, lips, or tongue  -black, tarry stools  -blood in urine or vomit  -breathing problems  -changes in vision  -confusion, trouble speaking or understanding  -fever or chills  -sudden weakness  -unusual bleeding or bruising  Side effects that usually do not require medical attention (report to your doctor or health care professional if they continue or are bothersome):  -diarrhea  -headache  -nausea,  vomiting  -pain in back, arms or legs  This list may not describe all possible side effects. Call your doctor for medical advice about side effects. You may report side effects to FDA at 6-668-HRZ-6095.  Where should I keep my medicine?  Keep out of the reach of children.  Store at room temperature between 15 and 30 degrees C (59 and 86 degrees F). Throw away any unused medicine after the expiration date.  NOTE: This sheet is a summary. It may not cover all possible information. If you have questions about this medicine, talk to your doctor, pharmacist, or health care provider.  © 2014, [employer]. (12/5/2012 11:26:00 AM)      Depression / Suicide Risk    As you are discharged from this [facility] facility, it is important to learn how to keep safe from harming yourself.    Recognize the warning signs:  · Abrupt changes in personality, positive or negative- including increase in energy   · Giving away possessions  · Change in eating patterns- significant weight changes-  positive or negative  · Change in sleeping patterns- unable to sleep or sleeping all the time   · Unwillingness or inability to communicate  · Depression  · Unusual sadness, discouragement and loneliness  · Talk of wanting to die  · Neglect of personal appearance   · Rebelliousness- reckless behavior  · Withdrawal from people/activities they love  · Confusion- inability to concentrate     If you or a loved one observes any of these behaviors or has concerns about self-harm, here's what you can do:  · Talk about it- your feelings and reasons for harming yourself  · Remove any means that you might use to hurt yourself (examples: pills, rope, extension cords, firearm)  · Get professional help from the community (Mental Health, Substance Abuse, psychological counseling)  · Do not be alone:Call your Safe Contact- someone whom you trust who will be there for you.  · Call your local CRISIS HOTLINE 967-2642 or 637-696-5046  · Call your local  Children's Mobile Crisis Response Team [place] (940) 252-0791 or www.[employer]  · Call the toll free National Suicide Prevention Hotlines   · National Suicide Prevention Lifeline 056-714-ERJK (6680)  · National Hope Line Network 800-SUICIDE (482-7178)

## 2018-02-02 NOTE — PROGRESS NOTES
"Pulmonary Critical Care Progress Note        Chief Complaint: post-cath    History of Present Illness: \"Mr. Barkley is a 52-year-old male with a past medical history of coronary artery disease, diabetes, dyslipidemia, hypertension. Mr. Barkley underwent a coronary angiography today for total occlusion of the right coronary artery, he had 3 drug-eluting stents placed to the RCA, at the end of the case the patient was noted to have a small extravasation from the marginal branch. He had a bedside echocardiogram which did not show significant pericardial effusion and is admitted to the cardiac intensive care unit. At this time the patient denies chest pain, shortness of breath, palpitations, abdominal pain, groin pain of bleeding.\"    Review of Systems   Constitutional: Negative for chills and fever.   Respiratory: Negative for cough, sputum production, shortness of breath and stridor.    Cardiovascular: Negative for chest pain.   Gastrointestinal: Negative for abdominal pain, nausea and vomiting.   Genitourinary: Negative for dysuria.   Musculoskeletal: Positive for back pain. Negative for myalgias.   Skin: Negative for rash.   Neurological: Negative for dizziness, sensory change and focal weakness.       Interval Events:  24 hour interval history reviewed   - doing well, slight back pain otherwise feels good   - Neuro: Alert and oriented   - HR: Sinus rhythm, 60s-80s   - BP: 100s-120s systolic   - GI: Tolerating diet   - UOP: Adequate   - Hogan: No   - Tm: 36.8 Celsius   - Lines: Peripheral IV   - PPx: GI diet, DVT ambulating   - On room air    PFSH:  No change.    Physical Exam   Constitutional: He is oriented to person, place, and time and well-developed, well-nourished, and in no distress.   HENT:   Head: Normocephalic and atraumatic.   Right Ear: External ear normal.   Left Ear: External ear normal.   Nose: Nose normal.   Mouth/Throat: Oropharynx is clear and moist.   Eyes: Conjunctivae and EOM are normal. Pupils are " equal, round, and reactive to light.   Neck: Neck supple. No JVD present. No tracheal deviation present.   Cardiovascular: Normal rate, regular rhythm, normal heart sounds and intact distal pulses.  Exam reveals no gallop and no friction rub.    No murmur heard.  Pulmonary/Chest: Effort normal and breath sounds normal. No respiratory distress. He has no wheezes.   Abdominal: Soft. Bowel sounds are normal. He exhibits no distension. There is no tenderness.   Musculoskeletal: Normal range of motion. He exhibits no edema or tenderness.   Neurological: He is alert and oriented to person, place, and time. No cranial nerve deficit. He exhibits normal muscle tone. Coordination normal. GCS score is 15.   Skin: Skin is warm and dry.   Psychiatric: Affect and judgment normal.   Nursing note and vitals reviewed.      Respiratory:     Pulse Oximetry: 92 %  Chest Tube Drains:  ImagingAvailable data reviewed         Invalid input(s): [id number]    HemoDynamics:  Pulse: 88, Heart Rate (Monitored): 70  Blood Pressure: 114/69, NIBP: 114/68     Imaging: Available data reviewed        Neuro:  GCS Total Josephine Coma Score: 15  Imaging: Available data reviewed    Fluids:  Intake/Output       01/31/18 0700 - 02/01/18 0659 (Not Admitted) 02/01/18 0700 - 02/02/18 0659 02/02/18 0700 - 02/03/18 0659      [id number] 6676-4702 Total 2942-4464 5932-8765 Total 2649-9797 3067-5610 Total       Intake    P.O.  --  -- --  120  100 220  --  -- --    P.O. -- -- -- 120 100 220 -- -- --    I.V.  --  -- --  1250  -- 1250  --  -- --    IV Volume -- -- -- 1250 -- 1250 -- -- --    Total Intake -- -- -- 6046 752 9570 -- -- --       Output    Urine  --  -- --  600  1250 1850  --  -- --    Void (ml) -- -- -- 600 1250 1850 -- -- --    Total Output -- -- -- 600 1250 1850 -- -- --       Net I/O     -- -- -- 770 -1150 -380 -- -- --        Weight: 115 kg (253 lb 8.5 oz)  Recent Labs      02/02/18   0500   SODIUM  136   POTASSIUM  4.4   CHLORIDE  105   CO2  24    BUN  14   CREATININE  0.75   CALCIUM  9.0       GI/Nutrition:  Imaging: Available data reviewed  taking PO  Liver Function  Recent Labs      18   0500   GLUCOSE  209*       Heme:  Recent Labs      18   0500   RBC  4.81   HEMOGLOBIN  14.1   HEMATOCRIT  42.4   PLATELETCT  245       Infectious Disease:  Temp  Av.7 °C (98 °F)  Min: 36.4 °C (97.6 °F)  Max: 36.8 °C (98.2 °F)  Micro: no cultures pending  Recent Labs      18   0500   WBC  9.0     Current Facility-Administered Medications   Medication Dose Frequency Provider Last Rate Last Dose   • NS infusion   PPU Continuous Shabnam Roberts M.D. 125 mL/hr at 18 1549     • ondansetron (ZOFRAN) syringe/vial injection 4 mg  4 mg Q4HRS PRN Shabnam Roberts M.D.   4 mg at 18 1556   • dexamethasone (DECADRON) injection 4 mg  4 mg Once PRN Shabnam Roberts M.D.       • diphenhydrAMINE (BENADRYL) injection 25 mg  25 mg Q6HRS PRN Shabnam Roberts M.D.       • haloperidol lactate (HALDOL) injection 1 mg  1 mg Q6HRS PRN Shabnam Roberts M.D.       • scopolamine (TRANSDERM-SCOP) patch 1 Patch  1 Patch Q72HRS PRN Shabnam Roberts M.D.       • aspirin EC (ECOTRIN) tablet 81 mg  81 mg DAILY Shabnam Roberts M.D.       • prasugrel (EFFIENT) tablet 10 mg  10 mg DAILY Shabnam Roberts M.D.       • acetaminophen (TYLENOL) tablet 650 mg  650 mg Q6HRS PRN Shabnam Roberts M.D.       • acetaminophen/caffeine/butalbital 325-40-50 mg (FIORICET) -40 MG per tablet 1 Tab  1 Tab Q4HRS PRN Shabnam Roberts M.D.       • atorvastatin (LIPITOR) tablet 80 mg  80 mg Q EVENING Shabnam Roberts M.D.   80 mg at 18   • metoprolol (LOPRESSOR) tablet 12.5 mg  12.5 mg BID Shabnam Roberts M.D.   12.5 mg at 18   • morphine (pf) 4 mg/ml injection 2-4 mg  2-4 mg Q2HRS PRN Lico Singh, PharmD        Or   • morphine (pf) 10 mg/ml 10 MG/ML injection 5 mg  5 mg Q2HRS PRN Lico Singh,  PharmD       • insulin regular (HUMULIN R) injection 1-6 Units  1-6 Units Q6HRS Luisito Lambert Jr., D.O.   2 Units at 02/02/18 0515    And   • glucose 4 g chewable tablet 16 g  16 g Q15 MIN PRN Luisito Lambert Jr., D.O.        And   • dextrose 50% (D50W) injection 25 mL  25 mL Q15 MIN PRN Luisito Lambert Jr., D.O.         Last reviewed on 2/1/2018  5:23 PM by Kady Max R.N.    Quality  Measures:  Labs reviewed, Medications reviewed and Radiology images reviewed  Hogan catheter: No Hogan      DVT Prophylaxis: Not indicated at this time, ambulatory  DVT prophylaxis - mechanical: SCDs  Ulcer prophylaxis: Not indicated      Problems/Plan:  Marginal branch extravasation following PCI              - Echocardiogram this AM without effusion   - Hemodynamically stable    CAD              - DAPT, statin, B-blocker      DM              - SSI, Accuchecks     HTN              - B-blocker, ACE-I when able     HLP              - statin     Prophylaxis: SCDs    To discharge home today    Discussed patient condition and risk of morbidity and/or mortality with RN, RT, Pharmacy, Charge nurse / hot rounds and Patient.

## 2018-02-02 NOTE — PROGRESS NOTES
Discharge orders received. Pt escorted out to [facility] with RN and significant other. Discharge orders discussed with pt and significant other. RN and pt discussed medications, cardiac rehab, and follow up appointment. Discharge paper work signed and completed. Copy sent home with pt. Physical therpay at bedside to discuss mobility and exertion with pt.  All questions answered. All belongings accounted for.

## 2018-02-02 NOTE — PROCEDURES
DATE OF SERVICE:  02/01/2018    PREOPERATIVE DIAGNOSES:  1.  Recent non-ST segment elevation myocardial infarction with residual   chronic total occlusion of the right coronary artery with evidence of ischemia   of the inferior wall on myocardial perfusion imaging.  2.  Status post recent stenting of the left circumflex artery for non-ST segment   elevation myocardial infarction.    POSTOPERATIVE DIAGNOSES:  1.  Chronic total occlusion of the [id number]-[id number] right coronary artery with   partial left to right collateral flow.  2.  Patent left circumflex artery stent and no flow limiting disease in the   left anterior descending artery.  3.  Status post stenting of the right coronary artery with 2.25x20 and 2.25x24   mm and 2.5x24 mm Synergy drug-eluting stents and 2.8x16 mm covered stent in   the proximal right coronary artery with no significant residual stenosis in   the stented segment and MARCUS-3 antegrade flow.    PROCEDURES:  1.  Selective coronary angiography.  2.  Percutaneous coronary intervention of chronic total occlusion of the right   coronary artery.    DESCRIPTION OF PROCEDURE:  After informed consent was obtained, the patient   was brought to cardiac catheterization laboratory in fasting state.  Both   groins were prepped and draped in the usual sterile fashion.  A 2% Xylocaine   was used as a local anesthesia.    Next, using portable ultrasound, the right common femoral artery was   identified and a 7-Trinidadian 45 cm long sheath was placed using modified   Seldinger technique.    Next, an 8-Trinidadian x 45 cm long sheath was then placed in the left common   femoral artery using modified Seldinger technique and ultrasound guidance as   well.    Next, an 7-Trinidadian Amplatz left 1 guide catheter was then seated into the right   coronary artery and an 8-Trinidadian EBU 3.5 guide was then seated into the left   main.  Simultaneous right and left coronary angiography was then performed in   2 orthogonal views.  9000  units of heparin was administered intravenously.  We   noted some ventricularizing of the pressure from the left coronary; therefore,   the guide was exchanged to an 8-Azeri EBU 3.5 guide with sidehole.    The diagnostic angiography reviewed a decent septal collateral into the distal   right coronary artery, but not much up of antegrade collateral.  The   occlusion was also a flush occlusion at the small acute marginal branch.  We felt that a retrograde approach is the better option.    A Whisper wire was then advanced into the septal branch, which seemed to   provide good collateral flow into the posterolateral branch of the right   coronary artery. A Corsair catheter was then advanced over the wire into the septal   .  The Whisper wire was then exchanged for Scion wire. However, despite   multiple attempts, the Scion wire could not be advanced into the distal right   coronary artery.  We then performed a selective injection into the Corsair.  It   did not show a clear collateral.  The Scion wire was then redirected into a different   septal branch. After relatively brief manipulation, at this time the Scion wire   crossed into the distal right coronary artery into an early takeoff posterior   descending artery. The Corsair was then advanced over the Scion wire into the   distal right coronary artery.  The Scion was subsequently exchanged for a    150 and then  200. However, when we advanced the  200 in the   retrograde fashion, it appeared that the  wire entered into subintimal space.    At this point we believed that a reverse CART technique was necessary.    However, attempt to advance the wire from the antegrade direction kept going into the   acute marginal branch and did not penetrate the occlusion.  A 1.5 balloon was   then used to partially block entry into the small acute marginal branch, and   with the Guidezilla, we were able to advance the wire past occlusion using    knuckle technique.  Also, using a knuckle wire, we were able to advance the   Corsair catheter in the retrograde fashion proximal to the occlusion site.    Next, the occluded area was dilated with a 2 mm and subsequently 2.5 mm balloon   to create the connection between the true lumen and subintimal space.    After several inflations, we were able to advance the retrograde wire and   subsequently the retrograde Corsair into Guidezilla.  The Corsair catheter was   advanced into the proximal portion of the guide catheter and a guidewire was   exchanged for RG3.  RG3 was then externalized from the antegrade   AL1 guide.  Attempt to advance the 2.5 mm stent at this point was   unsuccessful.  We then performed additional balloon inflation with a 2.5 mm   balloon and this allowed placement of a 2.25x24 mm Synergy drug-eluting stent   to the distal portion of the occlusion into the proximal portion of an early   takeoff posterior descending artery.  Next, a 2.5x24 mm Synergy drug-eluting   stent was then deployed into the more proximal portion of the occlusion with   slight overlap with the first stent.  Subsequent angiography showed no   significant stenosis in the stented segment but we noted some   subintimal dissection distal to the distal edge of the stent as well as a   small extravasation coming from the first acute marginal branch.  We did   perform a prolonged inflation across the small acute marginal branch, but the   extravasation persisted.  The patient, however, remained hemodynamically   stable, although he reported minor chest pain.  We did perform a stat bedside   Echocardiography and there was no pericardial effusion noted.    At this point, we therefore decided to place additional stent into the   proximal portion of the early takeoff posterior descending artery with a   2.25x20 mm Synergy drug-eluting stent at 8 atmospheres.  Subsequent   angiography showed brisk antegrade flow inside the stented  segment with some   diffuse spasm distal to the distal stent, but with brisk flow.  Another   guidewire was advanced into the posterolateral branch that was partially jailed   by the most distal stent. The ostium of the PLV was then dilated with a 2 mm balloon.    Several inflations were performed up to 14 atmospheres.  Subsequent   angiography showed no significant residual stenosis with brisk flow in both   PLV and PDA.  However, the small extravasation persisted.  We   therefore decided to place a stent graft 2.8x16 mm at 16 atmospheres.  The   stent was then post-dilated with 2.75x20 mm noncompliant balloon up to 18   atmospheres.  Subsequent angiography showed no significant residual stenosis   with minimal extravasation, we therefore decided to terminate the procedure.    The Corsair was then advanced into the stented segment and the RG3 wire was   then removed.  The Corsair was then subsequently removed.  Final angiography   was then performed, which showed acceptable result.  The guiding catheter was   removed over the wire.  Angiography of the left common femoral artery was   performed and found to be suitable for closure device.  The left femoral   sheath was removed.  Hemostasis was obtained using a 8-Nepalese Angio-Seal.    The sheath in the right groin was then exchanged for a 7-Nepalese short sheath. It   was secured to the skin.    At this point, stat EKG was also performed.  It did not show any new ST   changes.    Because of the small extravasation, we decided to reverse his anticoagulation.    Activated clotting time by this point was in the low 220.  We did administer   30 mg of protamine.  The patient was then transported to cardiac intensive   care unit in relatively stable condition.    FINDINGS:  The left main is a medium size vessel.  It is angiographically free   of disease, bifurcating into left anterior descending and left circumflex   artery.    The left anterior descending artery is around  2.5 mm in diameter.  It extends   slightly beyond the apex.  It gives rise to one small and one medium sized   diagonal branches.  There is about 30% mid left anterior descending stenosis,   but does not appear to be flow limiting.  Antegrade flow was normal.     Left circumflex artery is also around 2.5 mm in diameter.  It gives rises to 2   medium sized obtuse marginal branches. An existing stent was noted in the mid   left circumflex artery.  The stent is widely open with brisk antegrade flow.    The right coronary artery was occluded shortly after its origin at the   beginning of the case.  The occlusion occurred at the origin of a small first   acute marginal branch.  The proximal/ostial right coronary artery also   appeared to have some diffuse moderate disease as well.  The distal right   coronary artery was visualized from the left to right collateral revealed a   relatively early takeoff posterior descending artery and about 30 mm or so occlusion   of the proximal and mid right coronary artery.    After stenting, the right coronary artery has no significant residual stenosis in the   stented segment.  The proximal portion of the posterior descending artery has   moderate disease in the range of 20%.  Antegrade flow was brisk.    PLAN:  Monitor closely in the ICU.  If stable, we will remove the right   femoral arterial sheath later this evening.  Repeat echocardiography tomorrow.    Continue Prasugrel and aspirin.  Risk factor modification.       ____________________________________     MD YENIFER SEALS / NAN    DD:  02/01/2018 15:59:46  DT:  02/01/2018 17:03:03    D#:  [id number]  Job#:  [id number]

## 2018-02-02 NOTE — PROGRESS NOTES
Right after initiation of Femostop 1600, Pt HR dropped to 54 SR. BP dropped to as low at 74 systolic. Pt felt nauseous. Pt placed in trendelenburg and BS taken. Fluids running at 125 mL/hr. Pt has not eaten all day. Dr Lambert informed about episode. Pt VSS.

## 2018-02-02 NOTE — CARE PLAN
Problem: Safety  Goal: Will remain free from injury    Intervention: Provide assistance with mobility  Assistance with mobility provided. Pt encouraged to mobilize once bedrest orders were completed.       Problem: Hemodynamic Status  Goal: Vital Signs and Fluid Balance Management    Intervention: Cardiac Monitoring, Pulse Oximetry  Continuous hemodynamic and pulse oximetry monitoring in place.

## 2018-02-02 NOTE — PROGRESS NOTES
Fem stop in place appropriately at 1545. Fem stop completely removed and no signs of bleeding from site. Dry gauze and tegaderm in place.

## 2018-02-02 NOTE — PROGRESS NOTES
Became nauseated and hypotensive after Femstop was placed earlier  Currently chest pain free  Denies SOB  /70/ mmHg HR in high 60s  Will obtain CXR and continue close monitoring  Continue IV fluid till 8 PM

## 2018-02-02 NOTE — PROGRESS NOTES
Hemodynamically stable  Mild pleuritic type pain  No angina  No groin hematoma  No rub/rales  EKG no new change  CXR unremarkable  ECHO this AM no pericardial effusion  Will d/c home today   No heavy lifting for a week  Continue current meds  F/U in 1 week

## 2018-02-03 NOTE — DISCHARGE SUMMARY
DATE OF ADMISSION:  02/01/2018    DATE OF DISCHARGE:  02/02/2018    FINAL DIAGNOSES:  1.  Chronic total occlusion of the right coronary with angina and positive   myocardial perfusion imaging for inferolateral wall ischemia.  2.  Status post stenting of the chronically occluded right coronary artery   with 3 Synergy drug-eluting stents and 2.8 mm stent graft in the proximal portion of right   coronary artery.  3.  History of recent non-ST segment elevation myocardial infarction with patent left   circumflex stent.  4.  History of hypertension.  5.  History of dyslipidemia.    PROCEDURE:  Selective coronary angiography and percutaneous coronary   intervention of chronically occluded right coronary, on February 1st    Reason for admission: positive stress test with chronically occluded right coronary and   chest pain, here for elective coronary intervention.    BRIEF SUMMARY:  The patient is 52 years old male who suffered a non-ST segment   elevation myocardial infarction about 8 weeks ago.  Cardiac catheterization   at that time showed subtotal occlusion of the mid left circumflex artery as   well as a chronic total occlusion of the right coronary artery with left to   right collateral.  He underwent successful stenting of the circumflex artery   at that time.  The patient subsequently underwent myocardial perfusion   imaging, which showed ischemia in the inferolateral wall.  He also reported   some dyspnea on exertion and angina and therefore was brought in for attempt   intervention of chronically occluded right coronary artery.    The procedure was performed on the date of admission.  The right coronary   artery was recanalized.  There was a small extravasation from the proximal   part of right coronary artery probably from the small acute marginal branch;   therefore, a stent graft was placed.  His intra-procedural echocardiography   did not show any pericardial effusion.    He was observed closely overnight.   Serial electrocardiogram showed no new   changes.  Repeat echocardiography the following day also did not show any   pericardial effusion.  He was hemodynamically stable throughout the night   except brief bradycardia and hypotension when a right femoral sheath was   placed and a FemoStop was applied probably from vasovagal.     Next morning, he has no angina-like chest pain.  Blood pressure was in the 130 range.    Both groins have no significant hematoma.  He was therefore released home in   stable condition.    DISCHARGE INSTRUCTIONS:  No heavy lifting more than 5 pounds for a week.    Resume cardiac diet and home medications, which include aspirin 81 mg   daily, prasugrel 10 mg daily, lisinopril 10 mg daily, metoprolol 25 twice a   day, atorvastatin 80 mg daily, omega-3 fatty acid, vitamin D.    FOLLOW UP PLANS:  Follow up in our office in a week.       ____________________________________     MD YENIFER SEALS / NAN    DD:  02/02/2018 08:25:31  DT:  02/02/2018 16:02:47    D#:  [id number]  Job#:  [id number]

## 2018-02-07 NOTE — DOCUMENTATION QUERY
DOCUMENTATION QUERY    PROVIDERS: Please select “Cosign w/ note”to reply to query.    To better represent the severity of illness of your patient, please review the following information and exercise your independent professional judgment in responding to this query.     It is noted in the Operative note and throughout the chart that there was extravasation of the marginal branch. A stent graft was placed in attempt to stop the bleed and patient was admitted to CICU for monitoring secondary to the extravasation.     Can this extravasation be further clarified as    1. Pre-existing extravasation   2. Intraoperative complication  3. Extravasation inherent and incidental to the procedure  4. Other explanation of clinical presentation (please document)  5. Unable to determine      The medical record reflects the following:   Clinical Findings  extravasation   Treatment  stenting, monitoring   Risk Factors     Location within medical record  Progress Notes, Discharge Summary and Operative Note     Thank you,   Marni Thompson

## 2018-02-12 ENCOUNTER — OFFICE VISIT (OUTPATIENT)
Dept: CARDIOLOGY | Facility: MEDICAL CENTER | Age: 53
End: 2018-02-12
Payer: COMMERCIAL

## 2018-02-12 VITALS
SYSTOLIC BLOOD PRESSURE: 110 MMHG | HEART RATE: 66 BPM | WEIGHT: 254 LBS | OXYGEN SATURATION: 97 % | DIASTOLIC BLOOD PRESSURE: 70 MMHG | HEIGHT: 76 IN | BODY MASS INDEX: 30.93 KG/M2

## 2018-02-12 DIAGNOSIS — Z95.5 S/P DRUG ELUTING CORONARY STENT PLACEMENT: ICD-10-CM

## 2018-02-12 DIAGNOSIS — I25.82 CHRONIC TOTAL OCCLUSION OF NATIVE CORONARY ARTERY: ICD-10-CM

## 2018-02-12 DIAGNOSIS — I25.10 CHRONIC TOTAL OCCLUSION OF NATIVE CORONARY ARTERY: ICD-10-CM

## 2018-02-12 DIAGNOSIS — R06.02 SHORTNESS OF BREATH: ICD-10-CM

## 2018-02-12 DIAGNOSIS — I10 ESSENTIAL HYPERTENSION: ICD-10-CM

## 2018-02-12 PROCEDURE — 99214 OFFICE O/P EST MOD 30 MIN: CPT | Performed by: INTERNAL MEDICINE

## 2018-02-12 PROCEDURE — 93000 ELECTROCARDIOGRAM COMPLETE: CPT | Performed by: INTERNAL MEDICINE

## 2018-02-12 RX ORDER — NITROGLYCERIN 0.4 MG/1
0.4 TABLET SUBLINGUAL ONCE
Status: DISCONTINUED | OUTPATIENT
Start: 2018-02-12 | End: 2018-02-12

## 2018-02-12 RX ORDER — NITROGLYCERIN 0.4 MG/1
0.4 TABLET SUBLINGUAL PRN
Qty: 25 TAB | Refills: 2 | Status: SHIPPED | OUTPATIENT
Start: 2018-02-12

## 2018-02-12 RX ORDER — ISOSORBIDE MONONITRATE 30 MG/1
30 TABLET, EXTENDED RELEASE ORAL EVERY MORNING
Qty: 30 TAB | Refills: 5 | Status: SHIPPED | OUTPATIENT
Start: 2018-02-12 | End: 2018-02-12 | Stop reason: SDUPTHER

## 2018-02-12 RX ORDER — ISOSORBIDE MONONITRATE 30 MG/1
30 TABLET, EXTENDED RELEASE ORAL EVERY MORNING
Qty: 30 TAB | Refills: 5 | Status: SHIPPED | OUTPATIENT
Start: 2018-02-12 | End: 2018-05-22

## 2018-02-12 ASSESSMENT — ENCOUNTER SYMPTOMS
NECK PAIN: 1
LOSS OF CONSCIOUSNESS: 0
FEVER: 0
EYE DISCHARGE: 0
VOMITING: 0
MYALGIAS: 0
DEPRESSION: 0
NERVOUS/ANXIOUS: 0
BLURRED VISION: 0
EYE PAIN: 0
NAUSEA: 0
COUGH: 0
HEMOPTYSIS: 0
PALPITATIONS: 0
ABDOMINAL PAIN: 0
WHEEZING: 0
BRUISES/BLEEDS EASILY: 0
HEADACHES: 1
SPEECH CHANGE: 0

## 2018-02-12 NOTE — LETTER
[facility]   [address], [address]  [place] [address]  Phone: [phone]  Fax: [phone]              Benoit Barkley  1965    Encounter Date: 2/12/2018    Shabnam Roberts M.D.          PROGRESS NOTE:  Subjective:   Benoit Barkley is a 52 y.o. male who presents today for f/u intermittent chest discomfort and recent PCI of chronically occluded right coronarartery    Chief Complaint: f/u above issues    Underwent PCI of RCA  11 days ago.  RCA was successfully recanalized with reverse CART technique.   Procedure was rather difficult.  RCA was relatively small with early take off PDA.   Several stents were implanted. The mid and distal RCA stents were rather small (2.25 mm stents) with 2.5 mm more proximal stent. Covered stent was used due to extravasation from small branch originating from the occlusion site.   Still reporting occasional tightness in upper chest/lower neck associated with dyspnea.   They occur randomly and not related to exertion.  It takes his energy away when it happens. This has been going on since before the first stent in December.  Able to walk 10-12 minutes wit out chest pain  No CHF type symptoms  Denies palptations, dizziness or syncope    Taking lisinopril due to his DM    LV EF has been normal    Past Medical History:   Diagnosis Date   • Acute coronary syndrome (CMS-HCC) 12/10/2017   • Arthritis    • Asthma    • CAD (coronary artery disease)    • Diabetes (CMS-HCC)     oral medication   • Dyslipidemia 12/8/2017   • Hypertension    • NSTEMI (non-ST elevated myocardial infarction) (CMS-HCC) 12/8/2017   • Pain      Past Surgical History:   Procedure Laterality Date   • CARDIAC CATH  12/09/2017    ALICIA to Circ,  RCA with collaterals.   • KNEE ARTHROSCOPY Left 1989   • KNEE ARTHROSCOPY Right 1988   • ANKLE ARTHROSCOPY Right    • ARTHROSCOPY, KNEE Bilateral    • HAND SURGERY Right    • HIP ARTHROPLASTY TOTAL Left    •  HIP ARTHROSCOPY Left    • NERVE ULNAR TRANSFER Right    • OTHER      neck   • OTHER Bilateral     2 left shoulder surgeries 3 right shoulder surgeries   • OTHER      lower back   • SHOULDER ARTHROSCOPY Left    • SHOULDER ARTHROSCOPY Right      Family History   Problem Relation Age of Onset   • Heart Disease Mother 50     CABG   • Heart Attack Father    • Heart Disease Father    • Heart Disease Maternal Grandmother 42     heart attack     History   Smoking Status   • Never Smoker   Smokeless Tobacco   • Never Used     Allergies   Allergen Reactions   • Ibuprofen      Stomach pains...     Outpatient Encounter Prescriptions as of 2/12/2018   Medication Sig Dispense Refill   • isosorbide mononitrate SR (IMDUR) 30 MG TABLET SR 24 HR Take 1 Tab by mouth every morning. 30 Tab 5   • nitroglycerin (NITROSTAT) 0.4 MG SL Tab Place 1 Tab under tongue as needed for Chest Pain. 25 Tab 2   • lisinopril (PRINIVIL) 10 MG Tab Take 10 mg by mouth every bedtime.     • prasugrel (EFFIENT) 10 MG Tab Take 10 mg by mouth every morning.     • Omega-3 Fatty Acids (EQL OMEGA 3 FISH OIL) 1400 MG Cap Take 2 Tabs by mouth 2 Times a Day.     • vitamin D (CHOLECALCIFEROL) 1000 UNIT Tab Take 1,000 Units by mouth 2 Times a Day.     • acetaminophen (TYLENOL) 325 MG Tab Take 650 mg by mouth every four hours as needed.     • atorvastatin (LIPITOR) 80 MG tablet Take 1 Tab by mouth every evening. 90 Tab 3   • metoprolol (LOPRESSOR) 25 MG Tab Take 1 Tab by mouth 2 times a day. 180 Tab 3   • acetaminophen/caffeine/butalbital 325-40-50 mg (FIORICET) -40 MG Tab Take 1 Tab by mouth every 6 hours as needed for Headache or Migraine. 30 Tab 1   • albuterol 108 (90 Base) MCG/ACT Aero Soln inhalation aerosol Inhale 2 Puffs by mouth every four hours as needed for Shortness of Breath.     • metformin (GLUCOPHAGE) 1000 MG tablet Take 1,000 mg by mouth 2 times a day, with meals.     • aspirin EC (ECOTRIN) 81 MG Tablet Delayed Response Take 81 mg by mouth every  "bedtime.     • multivitamin (THERAGRAN) Tab Take 1 Tab by mouth every morning.     • [DISCONTINUED] isosorbide mononitrate SR (IMDUR) 30 MG TABLET SR 24 HR Take 1 Tab by mouth every morning. 30 Tab 5   • [DISCONTINUED] nitroglycerin (NITROSTAT) tablet 0.4 mg        No facility-administered encounter medications on file as of 2/12/2018.      Review of Systems   Constitutional: Negative for fever.   HENT: Negative for congestion.    Eyes: Negative for blurred vision, pain and discharge.   Respiratory: Negative for cough, hemoptysis and wheezing.    Cardiovascular: Positive for chest pain. Negative for palpitations.   Gastrointestinal: Negative for abdominal pain, nausea and vomiting.   Musculoskeletal: Positive for neck pain. Negative for joint pain and myalgias.   Skin: Negative for rash.   Neurological: Positive for headaches. Negative for speech change and loss of consciousness.        Chronic   Endo/Heme/Allergies: Does not bruise/bleed easily.   Psychiatric/Behavioral: Negative for depression. The patient is not nervous/anxious.    All other systems reviewed and are negative.       Objective:   /70   Pulse 66   Ht 1.93 m (6' 4\")   Wt 115.2 kg (254 lb)   SpO2 97%   BMI 30.92 kg/m²      Physical Exam   Constitutional: He is oriented to person, place, and time. No distress.   HENT:   Mouth/Throat: Mucous membranes are normal.   Eyes: Conjunctivae and EOM are normal.   Neck: No JVD present. No tracheal deviation present. No thyroid mass and no thyromegaly present.   Cardiovascular: Normal rate, regular rhythm and intact distal pulses.    No murmur heard.  No groin hematoma   Pulmonary/Chest: Effort normal and breath sounds normal. No respiratory distress. He exhibits no tenderness.   Abdominal: Soft. There is no tenderness.   Musculoskeletal: He exhibits no edema.   Surgical scar right anterior aspect of his neck   Neurological: He is alert and oriented to person, place, and time. He has normal strength. " He displays no tremor.   Skin: Skin is warm and dry. He is not diaphoretic.   Psychiatric: He has a normal mood and affect. His behavior is normal.   Vitals reviewed.    EKG today by my review sinus rhythm rate 61 bpm, inferior St changes but no new change from prior tracing    Assessment:     1. S/P drug eluting coronary stent placement  EKG   2. Essential hypertension     3. Chronic total occlusion of native coronary artery  nitroglycerin (NITROSTAT) tablet 0.4 mg   4. Shortness of breath  EKG    nitroglycerin (NITROSTAT) tablet 0.4 mg       Medical Decision Making:  Today's Assessment / Status / Plan:     Is symptoms are probably angina. Will try Imdur 30 mg/d and SL NTG PRN.  To allow room for nitrates, will reduce lisinopril dose since LVSF has been normal.  Will postpone cardiac rehabilitation for another week or so but may continue gradually increase his walk.  Will see him back in 10 days or so or sooner as needed.  We will keep you posted about our findings and further recommendations as they become available. Please also do not hesitate to call for any questions.  Thank you kindly for allowing me to participate in the care of this patient.          No Recipients

## 2018-02-12 NOTE — LETTER
[facility]   [address], [address]  [place] [address]  Phone: [phone]  Fax: [phone]              Benoit Barkley  1965    Encounter Date: 2/12/2018    Shabnam Roberts M.D.          PROGRESS NOTE:  Subjective:   Benoit Barkley is a 52 y.o. male who presents today for f/u intermittent chest discomfort and recent PCI of chronically occluded right coronarartery    Chief Complaint: f/u above issues    Underwent PCI of RCA  11 days ago.  RCA was successfully recanalized with reverse CART technique.   Procedure was rather difficult.  RCA was relatively small with early take off PDA.   Several stents were implanted. The mid and distal RCA stents were rather small (2.25 mm stents) with 2.5 mm more proximal stent. Covered stent was used due to extravasation from small branch originating from the occlusion site.   Still reporting occasional tightness in upper chest/lower neck associated with dyspnea.   They occur randomly and not related to exertion.  It takes his energy away when it happens. This has been going on since before the first stent in December.  Able to walk 10-12 minutes wit out chest pain  No CHF type symptoms  Denies palptations, dizziness or syncope    Taking lisinopril due to his DM    LV EF has been normal    Past Medical History:   Diagnosis Date   • Acute coronary syndrome (CMS-HCC) 12/10/2017   • Arthritis    • Asthma    • CAD (coronary artery disease)    • Diabetes (CMS-HCC)     oral medication   • Dyslipidemia 12/8/2017   • Hypertension    • NSTEMI (non-ST elevated myocardial infarction) (CMS-HCC) 12/8/2017   • Pain      Past Surgical History:   Procedure Laterality Date   • CARDIAC CATH  12/09/2017    ALICIA to Circ,  RCA with collaterals.   • KNEE ARTHROSCOPY Left 1989   • KNEE ARTHROSCOPY Right 1988   • ANKLE ARTHROSCOPY Right    • ARTHROSCOPY, KNEE Bilateral    • HAND SURGERY Right    • HIP ARTHROPLASTY TOTAL Left    •  HIP ARTHROSCOPY Left    • NERVE ULNAR TRANSFER Right    • OTHER      neck   • OTHER Bilateral     2 left shoulder surgeries 3 right shoulder surgeries   • OTHER      lower back   • SHOULDER ARTHROSCOPY Left    • SHOULDER ARTHROSCOPY Right      Family History   Problem Relation Age of Onset   • Heart Disease Mother 50     CABG   • Heart Attack Father    • Heart Disease Father    • Heart Disease Maternal Grandmother 42     heart attack     History   Smoking Status   • Never Smoker   Smokeless Tobacco   • Never Used     Allergies   Allergen Reactions   • Ibuprofen      Stomach pains...     Outpatient Encounter Prescriptions as of 2/12/2018   Medication Sig Dispense Refill   • isosorbide mononitrate SR (IMDUR) 30 MG TABLET SR 24 HR Take 1 Tab by mouth every morning. 30 Tab 5   • nitroglycerin (NITROSTAT) 0.4 MG SL Tab Place 1 Tab under tongue as needed for Chest Pain. 25 Tab 2   • lisinopril (PRINIVIL) 10 MG Tab Take 10 mg by mouth every bedtime.     • prasugrel (EFFIENT) 10 MG Tab Take 10 mg by mouth every morning.     • Omega-3 Fatty Acids (EQL OMEGA 3 FISH OIL) 1400 MG Cap Take 2 Tabs by mouth 2 Times a Day.     • vitamin D (CHOLECALCIFEROL) 1000 UNIT Tab Take 1,000 Units by mouth 2 Times a Day.     • acetaminophen (TYLENOL) 325 MG Tab Take 650 mg by mouth every four hours as needed.     • atorvastatin (LIPITOR) 80 MG tablet Take 1 Tab by mouth every evening. 90 Tab 3   • metoprolol (LOPRESSOR) 25 MG Tab Take 1 Tab by mouth 2 times a day. 180 Tab 3   • acetaminophen/caffeine/butalbital 325-40-50 mg (FIORICET) -40 MG Tab Take 1 Tab by mouth every 6 hours as needed for Headache or Migraine. 30 Tab 1   • albuterol 108 (90 Base) MCG/ACT Aero Soln inhalation aerosol Inhale 2 Puffs by mouth every four hours as needed for Shortness of Breath.     • metformin (GLUCOPHAGE) 1000 MG tablet Take 1,000 mg by mouth 2 times a day, with meals.     • aspirin EC (ECOTRIN) 81 MG Tablet Delayed Response Take 81 mg by mouth every  "bedtime.     • multivitamin (THERAGRAN) Tab Take 1 Tab by mouth every morning.     • [DISCONTINUED] isosorbide mononitrate SR (IMDUR) 30 MG TABLET SR 24 HR Take 1 Tab by mouth every morning. 30 Tab 5   • [DISCONTINUED] nitroglycerin (NITROSTAT) tablet 0.4 mg        No facility-administered encounter medications on file as of 2/12/2018.      Review of Systems   Constitutional: Negative for fever.   HENT: Negative for congestion.    Eyes: Negative for blurred vision, pain and discharge.   Respiratory: Negative for cough, hemoptysis and wheezing.    Cardiovascular: Positive for chest pain. Negative for palpitations.   Gastrointestinal: Negative for abdominal pain, nausea and vomiting.   Musculoskeletal: Positive for neck pain. Negative for joint pain and myalgias.   Skin: Negative for rash.   Neurological: Positive for headaches. Negative for speech change and loss of consciousness.        Chronic   Endo/Heme/Allergies: Does not bruise/bleed easily.   Psychiatric/Behavioral: Negative for depression. The patient is not nervous/anxious.    All other systems reviewed and are negative.       Objective:   /70   Pulse 66   Ht 1.93 m (6' 4\")   Wt 115.2 kg (254 lb)   SpO2 97%   BMI 30.92 kg/m²      Physical Exam   Constitutional: He is oriented to person, place, and time. No distress.   HENT:   Mouth/Throat: Mucous membranes are normal.   Eyes: Conjunctivae and EOM are normal.   Neck: No JVD present. No tracheal deviation present. No thyroid mass and no thyromegaly present.   Cardiovascular: Normal rate, regular rhythm and intact distal pulses.    No murmur heard.  No groin hematoma   Pulmonary/Chest: Effort normal and breath sounds normal. No respiratory distress. He exhibits no tenderness.   Abdominal: Soft. There is no tenderness.   Musculoskeletal: He exhibits no edema.   Surgical scar right anterior aspect of his neck   Neurological: He is alert and oriented to person, place, and time. He has normal strength. " He displays no tremor.   Skin: Skin is warm and dry. He is not diaphoretic.   Psychiatric: He has a normal mood and affect. His behavior is normal.   Vitals reviewed.    EKG today by my review sinus rhythm rate 61 bpm, inferior St changes but no new change from prior tracing    Assessment:     1. S/P drug eluting coronary stent placement  EKG   2. Essential hypertension     3. Chronic total occlusion of native coronary artery  nitroglycerin (NITROSTAT) tablet 0.4 mg   4. Shortness of breath  EKG    nitroglycerin (NITROSTAT) tablet 0.4 mg       Medical Decision Making:  Today's Assessment / Status / Plan:     Is symptoms are probably angina. Will try Imdur 30 mg/d and SL NTG PRN.  To allow room for nitrates, will reduce lisinopril dose since LVSF has been normal.  Will postpone cardiac rehabilitation for another week or so but may continue gradually increase his walk.  Will see him back in 10 days or so or sooner as needed.  We will keep you posted about our findings and further recommendations as they become available. Please also do not hesitate to call for any questions.  Thank you kindly for allowing me to participate in the care of this patient.          No Recipients

## 2018-02-13 LAB — EKG IMPRESSION: NORMAL

## 2018-02-13 NOTE — PROGRESS NOTES
Subjective:   Benoit Barkley is a 52 y.o. male who presents today for f/u intermittent chest discomfort and recent PCI of chronically occluded right coronarartery    Chief Complaint: f/u above issues    Underwent PCI of RCA  11 days ago.  RCA was successfully recanalized with reverse CART technique.   Procedure was rather difficult.  RCA was relatively small with early take off PDA.   Several stents were implanted. The mid and distal RCA stents were rather small (2.25 mm stents) with 2.5 mm more proximal stent. Covered stent was used due to extravasation from small branch originating from the occlusion site.   Still reporting occasional tightness in upper chest/lower neck associated with dyspnea.   They occur randomly and not related to exertion.  It takes his energy away when it happens. This has been going on since before the first stent in December.  Able to walk 10-12 minutes wit out chest pain  No CHF type symptoms  Denies palptations, dizziness or syncope    Taking lisinopril due to his DM    LV EF has been normal    Past Medical History:   Diagnosis Date   • Acute coronary syndrome (CMS-HCC) 12/10/2017   • Arthritis    • Asthma    • CAD (coronary artery disease)    • Diabetes (CMS-HCC)     oral medication   • Dyslipidemia 12/8/2017   • Hypertension    • NSTEMI (non-ST elevated myocardial infarction) (CMS-HCC) 12/8/2017   • Pain      Past Surgical History:   Procedure Laterality Date   • CARDIAC CATH  12/09/2017    ALICIA to Circ,  RCA with collaterals.   • KNEE ARTHROSCOPY Left 1989   • KNEE ARTHROSCOPY Right 1988   • ANKLE ARTHROSCOPY Right    • ARTHROSCOPY, KNEE Bilateral    • HAND SURGERY Right    • HIP ARTHROPLASTY TOTAL Left    • HIP ARTHROSCOPY Left    • NERVE ULNAR TRANSFER Right    • OTHER      neck   • OTHER Bilateral     2 left shoulder surgeries 3 right shoulder surgeries   • OTHER      lower back   • SHOULDER ARTHROSCOPY Left    • SHOULDER ARTHROSCOPY Right      Family History   Problem  Relation Age of Onset   • Heart Disease Mother 50     CABG   • Heart Attack Father    • Heart Disease Father    • Heart Disease Maternal Grandmother 42     heart attack     History   Smoking Status   • Never Smoker   Smokeless Tobacco   • Never Used     Allergies   Allergen Reactions   • Ibuprofen      Stomach pains...     Outpatient Encounter Prescriptions as of 2/12/2018   Medication Sig Dispense Refill   • isosorbide mononitrate SR (IMDUR) 30 MG TABLET SR 24 HR Take 1 Tab by mouth every morning. 30 Tab 5   • nitroglycerin (NITROSTAT) 0.4 MG SL Tab Place 1 Tab under tongue as needed for Chest Pain. 25 Tab 2   • lisinopril (PRINIVIL) 10 MG Tab Take 10 mg by mouth every bedtime.     • prasugrel (EFFIENT) 10 MG Tab Take 10 mg by mouth every morning.     • Omega-3 Fatty Acids (EQL OMEGA 3 FISH OIL) 1400 MG Cap Take 2 Tabs by mouth 2 Times a Day.     • vitamin D (CHOLECALCIFEROL) 1000 UNIT Tab Take 1,000 Units by mouth 2 Times a Day.     • acetaminophen (TYLENOL) 325 MG Tab Take 650 mg by mouth every four hours as needed.     • atorvastatin (LIPITOR) 80 MG tablet Take 1 Tab by mouth every evening. 90 Tab 3   • metoprolol (LOPRESSOR) 25 MG Tab Take 1 Tab by mouth 2 times a day. 180 Tab 3   • acetaminophen/caffeine/butalbital 325-40-50 mg (FIORICET) -40 MG Tab Take 1 Tab by mouth every 6 hours as needed for Headache or Migraine. 30 Tab 1   • albuterol 108 (90 Base) MCG/ACT Aero Soln inhalation aerosol Inhale 2 Puffs by mouth every four hours as needed for Shortness of Breath.     • metformin (GLUCOPHAGE) 1000 MG tablet Take 1,000 mg by mouth 2 times a day, with meals.     • aspirin EC (ECOTRIN) 81 MG Tablet Delayed Response Take 81 mg by mouth every bedtime.     • multivitamin (THERAGRAN) Tab Take 1 Tab by mouth every morning.     • [DISCONTINUED] isosorbide mononitrate SR (IMDUR) 30 MG TABLET SR 24 HR Take 1 Tab by mouth every morning. 30 Tab 5   • [DISCONTINUED] nitroglycerin (NITROSTAT) tablet 0.4 mg        No  "facility-administered encounter medications on file as of 2/12/2018.      Review of Systems   Constitutional: Negative for fever.   HENT: Negative for congestion.    Eyes: Negative for blurred vision, pain and discharge.   Respiratory: Negative for cough, hemoptysis and wheezing.    Cardiovascular: Positive for chest pain. Negative for palpitations.   Gastrointestinal: Negative for abdominal pain, nausea and vomiting.   Musculoskeletal: Positive for neck pain. Negative for joint pain and myalgias.   Skin: Negative for rash.   Neurological: Positive for headaches. Negative for speech change and loss of consciousness.        Chronic   Endo/Heme/Allergies: Does not bruise/bleed easily.   Psychiatric/Behavioral: Negative for depression. The patient is not nervous/anxious.    All other systems reviewed and are negative.       Objective:   /70   Pulse 66   Ht 1.93 m (6' 4\")   Wt 115.2 kg (254 lb)   SpO2 97%   BMI 30.92 kg/m²     Physical Exam   Constitutional: He is oriented to person, place, and time. No distress.   HENT:   Mouth/Throat: Mucous membranes are normal.   Eyes: Conjunctivae and EOM are normal.   Neck: No JVD present. No tracheal deviation present. No thyroid mass and no thyromegaly present.   Cardiovascular: Normal rate, regular rhythm and intact distal pulses.    No murmur heard.  No groin hematoma   Pulmonary/Chest: Effort normal and breath sounds normal. No respiratory distress. He exhibits no tenderness.   Abdominal: Soft. There is no tenderness.   Musculoskeletal: He exhibits no edema.   Surgical scar right anterior aspect of his neck   Neurological: He is alert and oriented to person, place, and time. He has normal strength. He displays no tremor.   Skin: Skin is warm and dry. He is not diaphoretic.   Psychiatric: He has a normal mood and affect. His behavior is normal.   Vitals reviewed.    EKG today by my review sinus rhythm rate 61 bpm, inferior St changes but no new change from prior " tracing    Assessment:     1. S/P drug eluting coronary stent placement  EKG   2. Essential hypertension     3. Chronic total occlusion of native coronary artery  nitroglycerin (NITROSTAT) tablet 0.4 mg   4. Shortness of breath  EKG    nitroglycerin (NITROSTAT) tablet 0.4 mg       Medical Decision Making:  Today's Assessment / Status / Plan:     Is symptoms are probably angina. Will try Imdur 30 mg/d and SL NTG PRN.  To allow room for nitrates, will reduce lisinopril dose since LVSF has been normal.  Will postpone cardiac rehabilitation for another week or so but may continue gradually increase his walk.  Will see him back in 10 days or so or sooner as needed.  We will keep you posted about our findings and further recommendations as they become available. Please also do not hesitate to call for any questions.  Thank you kindly for allowing me to participate in the care of this patient.

## 2018-02-15 ENCOUNTER — TELEPHONE (OUTPATIENT)
Dept: CARDIOLOGY | Facility: MEDICAL CENTER | Age: 53
End: 2018-02-15

## 2018-02-16 ENCOUNTER — TELEPHONE (OUTPATIENT)
Dept: CARDIOLOGY | Facility: MEDICAL CENTER | Age: 53
End: 2018-02-16

## 2018-02-16 DIAGNOSIS — I10 ESSENTIAL HYPERTENSION: ICD-10-CM

## 2018-02-16 RX ORDER — LOSARTAN POTASSIUM 25 MG/1
25 TABLET ORAL DAILY
Qty: 30 TAB | Refills: 11 | COMMUNITY
Start: 2018-02-16

## 2018-02-16 NOTE — TELEPHONE ENCOUNTER
"Patient upset, stating he was put on Imdur and NTG and he does not understand why.  Chart indicates he was c/o residual chest tightness and dyspnea after intervention so Imdur was prescribed.  He is asking how long he has to be on \"all of these medications\" to which I explained that he has known heart disease with recent stenting. He replied \"there is nothing wrong with my heart, it is my arteries that have the stents\".  Lengthy educating discussion regarding CAD, stents, heart disease, need for medications, statins, diet, etc. And he was very thankful.  He states what he meant to describe to Dr. PORTER at  was that he has a \"lump\" in his throat and a sensation to cough, and not chest tightness and SOB.  [facility] his lisinopril to Losartan 25mg/d based on his description of symptoms.  Patient is very reluctant to add Inderal or any other medications to his regimen, but will do so if CR MD thinks he needs this.  He understands that CR MD is aware of his coronary anatomy more so than his PCP.  He is returning for  in 1 week with CR MD and in the meantime will DC lisinopril, start Losartan and see if his symptoms resolve.  If he has chest tightness, dyspnea or his same symptoms continue he will start Imdur and call.  To CR MD.    "

## 2018-02-16 NOTE — TELEPHONE ENCOUNTER
----- Message from Lolita Meneses sent at 2/15/2018  4:27 PM PST -----  Regarding: patient confused about nitro medications  YENIFER/Lauren      Patient said CR put him on 2 different nitro medications. He's confused as to why he needs the nitroglycerin. He can be reached at [phone].

## 2018-02-22 ENCOUNTER — OFFICE VISIT (OUTPATIENT)
Dept: CARDIOLOGY | Facility: MEDICAL CENTER | Age: 53
End: 2018-02-22
Payer: COMMERCIAL

## 2018-02-22 VITALS
HEART RATE: 72 BPM | BODY MASS INDEX: 31.2 KG/M2 | OXYGEN SATURATION: 96 % | SYSTOLIC BLOOD PRESSURE: 130 MMHG | WEIGHT: 256.2 LBS | HEIGHT: 76 IN | DIASTOLIC BLOOD PRESSURE: 72 MMHG

## 2018-02-22 DIAGNOSIS — I25.10 CORONARY ARTERY DISEASE INVOLVING NATIVE CORONARY ARTERY OF NATIVE HEART WITHOUT ANGINA PECTORIS: ICD-10-CM

## 2018-02-22 DIAGNOSIS — E78.5 DYSLIPIDEMIA: ICD-10-CM

## 2018-02-22 DIAGNOSIS — Z95.5 S/P DRUG ELUTING CORONARY STENT PLACEMENT: ICD-10-CM

## 2018-02-22 DIAGNOSIS — I10 ESSENTIAL HYPERTENSION: ICD-10-CM

## 2018-02-22 PROCEDURE — 99214 OFFICE O/P EST MOD 30 MIN: CPT | Performed by: INTERNAL MEDICINE

## 2018-02-22 ASSESSMENT — ENCOUNTER SYMPTOMS
VOMITING: 0
ABDOMINAL PAIN: 0
HEMOPTYSIS: 0
NAUSEA: 0
SPEECH CHANGE: 0
WHEEZING: 0
PALPITATIONS: 0
BLURRED VISION: 0
MYALGIAS: 0
BRUISES/BLEEDS EASILY: 0
EYE DISCHARGE: 0
FEVER: 0
COUGH: 0
EYE PAIN: 0
LOSS OF CONSCIOUSNESS: 0
DEPRESSION: 0
CHILLS: 0
NERVOUS/ANXIOUS: 0

## 2018-02-22 NOTE — LETTER
[facility]   [address], [address]  [place] [address]  Phone: [phone]  Fax: [phone]              Benoit Barkley  1965    Encounter Date: 2/22/2018    Shabnam Roberts M.D.          PROGRESS NOTE:  Subjective:   Benoit Barkley is a 52 y.o. male who presents today for f/u CAD and recent PCI and medication change    Feeling better now. No longer with chest discomfort after stopping ACEI.  Has not started Imdur  He denies anypalpitation or heart failure type symptoms.  Denies any side effect from medications.    Past Medical History:   Diagnosis Date   • Acute coronary syndrome (CMS-HCC) 12/10/2017   • Arthritis    • Asthma    • CAD (coronary artery disease)    • Diabetes (CMS-HCC)     oral medication   • Dyslipidemia 12/8/2017   • Hypertension    • NSTEMI (non-ST elevated myocardial infarction) (CMS-HCC) 12/8/2017   • Pain      Past Surgical History:   Procedure Laterality Date   • CARDIAC CATH  12/09/2017    ALICIA to Circ,  RCA with collaterals.   • KNEE ARTHROSCOPY Left 1989   • KNEE ARTHROSCOPY Right 1988   • ANKLE ARTHROSCOPY Right    • ARTHROSCOPY, KNEE Bilateral    • HAND SURGERY Right    • HIP ARTHROPLASTY TOTAL Left    • HIP ARTHROSCOPY Left    • NERVE ULNAR TRANSFER Right    • OTHER      neck   • OTHER Bilateral     2 left shoulder surgeries 3 right shoulder surgeries   • OTHER      lower back   • SHOULDER ARTHROSCOPY Left    • SHOULDER ARTHROSCOPY Right      Family History   Problem Relation Age of Onset   • Heart Disease Mother 50     CABG   • Heart Attack Father    • Heart Disease Father    • Heart Disease Maternal Grandmother 42     heart attack     History   Smoking Status   • Never Smoker   Smokeless Tobacco   • Never Used     Allergies   Allergen Reactions   • Ibuprofen      Stomach pains...     Outpatient Encounter Prescriptions as of 2/22/2018   Medication Sig Dispense Refill   • losartan (COZAAR) 25 MG Tab Take 50 mg by  mouth every day. 30 Tab 11   • prasugrel (EFFIENT) 10 MG Tab Take 10 mg by mouth every morning.     • Omega-3 Fatty Acids (EQL OMEGA 3 FISH OIL) 1400 MG Cap Take 2 Tabs by mouth 2 Times a Day.     • vitamin D (CHOLECALCIFEROL) 1000 UNIT Tab Take 1,000 Units by mouth 2 Times a Day.     • acetaminophen (TYLENOL) 325 MG Tab Take 650 mg by mouth every four hours as needed.     • atorvastatin (LIPITOR) 80 MG tablet Take 1 Tab by mouth every evening. 90 Tab 3   • metoprolol (LOPRESSOR) 25 MG Tab Take 1 Tab by mouth 2 times a day. 180 Tab 3   • albuterol 108 (90 Base) MCG/ACT Aero Soln inhalation aerosol Inhale 2 Puffs by mouth every four hours as needed for Shortness of Breath.     • metformin (GLUCOPHAGE) 1000 MG tablet Take 1,000 mg by mouth 2 times a day, with meals.     • aspirin EC (ECOTRIN) 81 MG Tablet Delayed Response Take 81 mg by mouth every bedtime.     • multivitamin (THERAGRAN) Tab Take 1 Tab by mouth every morning.     • isosorbide mononitrate SR (IMDUR) 30 MG TABLET SR 24 HR Take 1 Tab by mouth every morning. 30 Tab 5   • nitroglycerin (NITROSTAT) 0.4 MG SL Tab Place 1 Tab under tongue as needed for Chest Pain. 25 Tab 2   • acetaminophen/caffeine/butalbital 325-40-50 mg (FIORICET) -40 MG Tab Take 1 Tab by mouth every 6 hours as needed for Headache or Migraine. 30 Tab 1     No facility-administered encounter medications on file as of 2/22/2018.      Review of Systems   Constitutional: Negative for chills and fever.   HENT: Negative for congestion.    Eyes: Negative for blurred vision, pain and discharge.   Respiratory: Negative for cough, hemoptysis and wheezing.    Cardiovascular: Negative for chest pain and palpitations.   Gastrointestinal: Negative for abdominal pain, nausea and vomiting.   Musculoskeletal: Negative for joint pain and myalgias.   Skin: Negative for itching and rash.   Neurological: Negative for speech change and loss of consciousness.   Endo/Heme/Allergies: Does not bruise/bleed  "easily.   Psychiatric/Behavioral: Negative for depression. The patient is not nervous/anxious.    All other systems reviewed and are negative.       Objective:   /72   Pulse 72   Ht 1.93 m (6' 4\")   Wt 116.2 kg (256 lb 3.2 oz)   SpO2 96%   BMI 31.19 kg/m²      Physical Exam   Constitutional: He is oriented to person, place, and time. No distress.   HENT:   Mouth/Throat: Mucous membranes are normal.   Eyes: Conjunctivae and EOM are normal.   Neck: No JVD present. No tracheal deviation present. No thyroid mass and no thyromegaly present.   Cardiovascular: Normal rate, regular rhythm and intact distal pulses.    No murmur heard.  Pulmonary/Chest: Effort normal and breath sounds normal. No respiratory distress. He exhibits no tenderness.   Abdominal: Soft. There is no tenderness.   Musculoskeletal: Normal range of motion. He exhibits no edema.   Neurological: He is alert and oriented to person, place, and time. He has normal strength. He displays no tremor.   Skin: Skin is warm and dry. He is not diaphoretic.   Psychiatric: He has a normal mood and affect. His behavior is normal.   Vitals reviewed.      Assessment:     1. S/P drug eluting coronary stent placement     2. Coronary artery disease involving native coronary artery of native heart without angina pectoris     3. Dyslipidemia     4. Essential hypertension         Medical Decision Making:  Today's Assessment / Status / Plan:     The patient's above cardiovascular conditions are stable.   If no chest discomfort, may leave off Imdur for now.  May start cardiac rehabilitation.  Will continue current medications and have him return for a followup in 3-4 months. Will be happy to see the patient sooner as needed.   Thank you for allowing me to participate in the caring of this patient.      No Recipients              "

## 2018-02-22 NOTE — PROGRESS NOTES
Subjective:   Benoit Barkley is a 52 y.o. male who presents today for f/u CAD and recent PCI and medication change    Feeling better now. No longer with chest discomfort after stopping ACEI.  Has not started Imdur  He denies anypalpitation or heart failure type symptoms.  Denies any side effect from medications.    Past Medical History:   Diagnosis Date   • Acute coronary syndrome (CMS-HCC) 12/10/2017   • Arthritis    • Asthma    • CAD (coronary artery disease)    • Diabetes (CMS-HCC)     oral medication   • Dyslipidemia 12/8/2017   • Hypertension    • NSTEMI (non-ST elevated myocardial infarction) (CMS-HCC) 12/8/2017   • Pain      Past Surgical History:   Procedure Laterality Date   • CARDIAC CATH  12/09/2017    ALICIA to Circ,  RCA with collaterals.   • KNEE ARTHROSCOPY Left 1989   • KNEE ARTHROSCOPY Right 1988   • ANKLE ARTHROSCOPY Right    • ARTHROSCOPY, KNEE Bilateral    • HAND SURGERY Right    • HIP ARTHROPLASTY TOTAL Left    • HIP ARTHROSCOPY Left    • NERVE ULNAR TRANSFER Right    • OTHER      neck   • OTHER Bilateral     2 left shoulder surgeries 3 right shoulder surgeries   • OTHER      lower back   • SHOULDER ARTHROSCOPY Left    • SHOULDER ARTHROSCOPY Right      Family History   Problem Relation Age of Onset   • Heart Disease Mother 50     CABG   • Heart Attack Father    • Heart Disease Father    • Heart Disease Maternal Grandmother 42     heart attack     History   Smoking Status   • Never Smoker   Smokeless Tobacco   • Never Used     Allergies   Allergen Reactions   • Ibuprofen      Stomach pains...     Outpatient Encounter Prescriptions as of 2/22/2018   Medication Sig Dispense Refill   • losartan (COZAAR) 25 MG Tab Take 50 mg by mouth every day. 30 Tab 11   • prasugrel (EFFIENT) 10 MG Tab Take 10 mg by mouth every morning.     • Omega-3 Fatty Acids (EQL OMEGA 3 FISH OIL) 1400 MG Cap Take 2 Tabs by mouth 2 Times a Day.     • vitamin D (CHOLECALCIFEROL) 1000 UNIT Tab Take 1,000 Units by mouth 2  "Times a Day.     • acetaminophen (TYLENOL) 325 MG Tab Take 650 mg by mouth every four hours as needed.     • atorvastatin (LIPITOR) 80 MG tablet Take 1 Tab by mouth every evening. 90 Tab 3   • metoprolol (LOPRESSOR) 25 MG Tab Take 1 Tab by mouth 2 times a day. 180 Tab 3   • albuterol 108 (90 Base) MCG/ACT Aero Soln inhalation aerosol Inhale 2 Puffs by mouth every four hours as needed for Shortness of Breath.     • metformin (GLUCOPHAGE) 1000 MG tablet Take 1,000 mg by mouth 2 times a day, with meals.     • aspirin EC (ECOTRIN) 81 MG Tablet Delayed Response Take 81 mg by mouth every bedtime.     • multivitamin (THERAGRAN) Tab Take 1 Tab by mouth every morning.     • isosorbide mononitrate SR (IMDUR) 30 MG TABLET SR 24 HR Take 1 Tab by mouth every morning. 30 Tab 5   • nitroglycerin (NITROSTAT) 0.4 MG SL Tab Place 1 Tab under tongue as needed for Chest Pain. 25 Tab 2   • acetaminophen/caffeine/butalbital 325-40-50 mg (FIORICET) -40 MG Tab Take 1 Tab by mouth every 6 hours as needed for Headache or Migraine. 30 Tab 1     No facility-administered encounter medications on file as of 2/22/2018.      Review of Systems   Constitutional: Negative for chills and fever.   HENT: Negative for congestion.    Eyes: Negative for blurred vision, pain and discharge.   Respiratory: Negative for cough, hemoptysis and wheezing.    Cardiovascular: Negative for chest pain and palpitations.   Gastrointestinal: Negative for abdominal pain, nausea and vomiting.   Musculoskeletal: Negative for joint pain and myalgias.   Skin: Negative for itching and rash.   Neurological: Negative for speech change and loss of consciousness.   Endo/Heme/Allergies: Does not bruise/bleed easily.   Psychiatric/Behavioral: Negative for depression. The patient is not nervous/anxious.    All other systems reviewed and are negative.       Objective:   /72   Pulse 72   Ht 1.93 m (6' 4\")   Wt 116.2 kg (256 lb 3.2 oz)   SpO2 96%   BMI 31.19 kg/m² "     Physical Exam   Constitutional: He is oriented to person, place, and time. No distress.   HENT:   Mouth/Throat: Mucous membranes are normal.   Eyes: Conjunctivae and EOM are normal.   Neck: No JVD present. No tracheal deviation present. No thyroid mass and no thyromegaly present.   Cardiovascular: Normal rate, regular rhythm and intact distal pulses.    No murmur heard.  Pulmonary/Chest: Effort normal and breath sounds normal. No respiratory distress. He exhibits no tenderness.   Abdominal: Soft. There is no tenderness.   Musculoskeletal: Normal range of motion. He exhibits no edema.   Neurological: He is alert and oriented to person, place, and time. He has normal strength. He displays no tremor.   Skin: Skin is warm and dry. He is not diaphoretic.   Psychiatric: He has a normal mood and affect. His behavior is normal.   Vitals reviewed.      Assessment:     1. S/P drug eluting coronary stent placement     2. Coronary artery disease involving native coronary artery of native heart without angina pectoris     3. Dyslipidemia     4. Essential hypertension         Medical Decision Making:  Today's Assessment / Status / Plan:     The patient's above cardiovascular conditions are stable.   If no chest discomfort, may leave off Imdur for now.  May start cardiac rehabilitation.  Will continue current medications and have him return for a followup in 3-4 months. Will be happy to see the patient sooner as needed.   Thank you for allowing me to participate in the caring of this patient.

## 2018-02-27 ENCOUNTER — NON-PROVIDER VISIT (OUTPATIENT)
Dept: CARDIOLOGY | Facility: MEDICAL CENTER | Age: 53
End: 2018-02-27
Payer: COMMERCIAL

## 2018-02-27 DIAGNOSIS — Z95.5 S/P DRUG ELUTING CORONARY STENT PLACEMENT: Primary | ICD-10-CM

## 2018-02-27 LAB — EKG IMPRESSION: NORMAL

## 2018-02-27 PROCEDURE — G0422 INTENS CARDIAC REHAB W/EXERC: HCPCS | Performed by: FAMILY MEDICINE

## 2018-02-27 PROCEDURE — G0423 INTENS CARDIAC REHAB NO EXER: HCPCS | Mod: 59 | Performed by: FAMILY MEDICINE

## 2018-02-27 ASSESSMENT — PATIENT HEALTH QUESTIONNAIRE - PHQ9
2. FEELING DOWN, DEPRESSED, IRRITABLE, OR HOPELESS: 0
8. MOVING OR SPEAKING SO SLOWLY THAT OTHER PEOPLE COULD HAVE NOTICED. OR THE OPPOSITE, BEING SO FIGETY OR RESTLESS THAT YOU HAVE BEEN MOVING AROUND A LOT MORE THAN USUAL: 0
4. FEELING TIRED OR HAVING LITTLE ENERGY: 1
7. TROUBLE CONCENTRATING ON THINGS, SUCH AS READING THE NEWSPAPER OR WATCHING TELEVISION: 0
5. POOR APPETITE OR OVEREATING: 0
SUM OF ALL RESPONSES TO PHQ QUESTIONS 1-9: 1
3. TROUBLE FALLING OR STAYING ASLEEP OR SLEEPING TOO MUCH: 0
SUM OF ALL RESPONSES TO PHQ9 QUESTIONS 1 AND 2: 0
SUM OF ALL RESPONSES TO PHQ QUESTIONS 1-9: 1
9. THOUGHTS THAT YOU WOULD BE BETTER OFF DEAD, OR OF HURTING YOURSELF: 0
1. LITTLE INTEREST OR PLEASURE IN DOING THINGS: 0
6. FEELING BAD ABOUT YOURSELF - OR THAT YOU ARE A FAILURE OR HAVE LET YOURSELF OR YOUR FAMILY DOWN: 0

## 2018-02-27 NOTE — PROGRESS NOTES
Cardiac Rehab Individual Treatment Plan Assessment: Initial 18 Session #     1   MRN: [id number]   Allergies: Ibuprofen   Patient Name: Benoit Barkley : 1965 Risk Stratification: High    Diagnoses:   1. S/P drug eluting coronary stent placement     Age: 52 y.o. Physician: Regina Jj M.D.    Date of Event: 18 Specialist: Arm[facility]   Risk Factors:  Hypertension, Hyperlipidemia, Diabetes, Family History, Age, Male > 45   Exercise Nutrition Education Psychosocial   Stages of change Stages of change Stages of change Stages of change   Preparation Preparation Preparation Preparation   Fitness Test Lipids Learning Barriers Outcome Survey Tools   DIST: 1365  Available: Yes Learning Barriers: Vision FP QOL Overall Score: 28.38   Max HR: 93 Date: 18 Family Support PHQ-9: 1   RPE: 9 Total: 114 mg/dL Yes Nutrition Screen: 66 %   SPO2: 96 % Tri mg/dL Lives: Spouse Intervention   MET: 4.03 HDL: 32 mg/dL Tobacco Use Behavioral Health Consult: Yes   EF= 60 (2018) LDL: 25 mg/dL History   Smoking Status   • Never Smoker   Smokeless Tobacco   • Never Used      Physician Referral: No   Ambulatory Status Diabetes Smoking Intervention Identifies Stressors: Yes   Fall Risk Assessed: Yes Diabetes: Yes Smoking Cessation Referral: N/A Drug Intervention: No   Exercise Ambulatory Status Assist Devices: None HbA1C: 8.5 % Date: 17 Ind. Education / Counseling: N/A Education   Exercise Prescription Monitors BS at Home: Yes Tobacco Adjunct: N/A Psychosocial Education: Coping Techniques, Positive Support System, S/S Depression   Mode: Treadmill, NuStep, UBE, Airdyne   Frequency: couple times a week Random BS: 209 (12/10/2017) Education Intervention Target Goal   Frequency:  3 days/week Weight Management Healthy Heart Education: Class Schedule Given, Medications Reviewed, Patient Education Binder Provided, Risk Factors Discussed, Videos Viewed per Miss[employer] Assess presence  "or absence of depression using a valid screening: Yes   Duration:  30-45 Weight: 114.8 kg (253 lb) Target Goal Use Stress Management: Yes   Intensity:  11-13 RPE Height: 193 cm (6' 3.98\") Complete Tobacco Cessation: Complete Tobacco Cessation: N/A Adverse Events: Yes   METS:  3.0-5.0 BMI (Calculated): 30.81 Educate / Review and have understanding of cardiac disease prevention: Educate / Review and have understanding of cardiac disease prevention: Yes Unexpected Events: Yes   Progression:  ^ increments of 1-3 to THR/RPE as tolerated Goal weight: 240 Medication Compliance: Yes    THR: THR: Other (see comment) (118-128) History   Alcohol Use No         Angina with Exercise?  Angina with Exercise: No      Resistance Training?  Resistance Training: Yes      Hypertension      Diagnosed with HTN: Yes Intervention      Resting BP: 137/76 Dietician Consult/Class: Pending (scheduled)      Peak Ex BP: 154/68 Nurse/Patient Discussion: Yes     Intervention Diabetes Ed Referral: Yes     Home Exercise:  Yes Discuss Maintenance /Wt Loss: Yes     Mode: Walk, Other (duplicate workouts from [employer]) Attend [employer]: Pending (scheduled)     Duration: 30-45 Dietary Goal: >=58 rate your plate, low sodium, low sugar DM2     Frequency: 7 days active  Education     Education Nutrition Education: S&S Hypo/Hyper glycemia, Relate Diabetes to CAD, Healthy Eating, Sodium Reduction     Equipment Orientation, Exercise Safety, S/S to Report, RPE Scale, Warm Up / Cool Down, Physically Active Target Goal     Target Goal LDL-C < 100 if trig. > 200:  Yes       Start Individual Exercise Rx Yes LDL-C < 70 for high risk patient:  Yes       BP < 140/90 or < 130/80 if DM or CKD Yes Non HDL-C Should be < 130:  Yes       Aerobic activity 30 + min / day 5 days / wk Yes HbA1C < 7%: Yes         BMI < 25: No (<29)       Notes: Monitor all sessions.  No body limitations.  21 visits.   Has chronic pain which is baseline at a 4/10.  Will not be testing glucose " "here.   Initial Assessment:  Heart Sounds: S1S2 WNL       Lung Sounds: clear throughout  bilaterally       Edema: none present        Right Peripheral Pulses:  Carotid: 2+  Radial: 2+  Dorsalis Pedis: 2+  Posterior Tibialis: 2+   Left Peripheral Pulses:  Carotid: 2+  Radial: 2+  Dorsalis Pedis: 2+  Posterior Tibialis: 2+    Incision: none present       Color: pink       Frame Size: Medium        Cognitive Assessment: A&O X 4, no cognitive deficits reported.       Fall Assessment:    Gait: steady  Balance: steady  Upper Body: no pain or limitations with ROM  Lower Body: no pain or limitation wtih ROM   Recent Falls: none reported.   Current Medications and Vaccinations: Reviewed  Patient Stated Goals: \"I would like to lose weight, I would like to get off of diabetes medication or at least cut them in half.  I would like to know my limits and what exercise I can do out of cardiac rehab.  I would like to be able to recognize stress and learn better coping techniques.\"  Other: Benoit arrives for orientation today.  Exercise current: walking 30-45 minutes most days of the week.  Goal: to know what other activity besides walking is safe for him to do.  Wants to feel confident about exercis eand activity.  Progress: engaged in attending ICR.   Nutrition current:  Rate your Plate score 66.  He has made several heart healthy changes since his event.  Low fat, low sodium choices and low sugar.  Goals: to make everything he is doing so far with diet is the right thing.  Progress: feels he has made many healthy changes.  Stress current: has an adult child living with him and says that can sometimes be stressful but does not feel depressed.  Goal: to learn new ways to manage stress when he does feel any.  Progress: denies feeling depressed, and says that he doesn't have a high level of stress.        "

## 2018-02-28 NOTE — PROGRESS NOTES
Date: 2/27/2018    Initial Individual Treatment Plan review for the [facility] Intensive Cardiac Rehabilitation (ICR) Program.    Benoit Barkley, 52 y.o. male, with qualifying diagnosis/diagnoses of S/P Stent.      Primary Care Provider: Regina Jj M.D.    Heart Specialist (s): Dr. Roberts    Patient has successfully completed 1 Exercise Sessions, and an appropriate number of corresponding Education Sessions.  Patient is an excellent candidate for the [facility] ([facility]CR) Program.    I will review the Individual Treatment Plan again at 30 day post-initiation of ICR.    Bg Lagos MD, [facility]  , [facility]ensive [facility] (ICR) Program

## 2018-03-02 ENCOUNTER — NON-PROVIDER VISIT (OUTPATIENT)
Dept: CARDIOLOGY | Facility: MEDICAL CENTER | Age: 53
End: 2018-03-02
Payer: COMMERCIAL

## 2018-03-02 DIAGNOSIS — Z95.5 S/P DRUG ELUTING CORONARY STENT PLACEMENT: ICD-10-CM

## 2018-03-02 LAB — EKG IMPRESSION: NORMAL

## 2018-03-02 PROCEDURE — G0423 INTENS CARDIAC REHAB NO EXER: HCPCS | Mod: 59 | Performed by: FAMILY MEDICINE

## 2018-03-02 PROCEDURE — G0422 INTENS CARDIAC REHAB W/EXERC: HCPCS | Performed by: FAMILY MEDICINE

## 2018-03-02 NOTE — NON-PROVIDER
Benoit Barkley attending cooking school from 10:00-11:00am.  Today  prepared Chocolate-Banana Pancakes.     Patient received handouts and nutrition information regarding the specific recipes.

## 2018-03-05 ENCOUNTER — NON-PROVIDER VISIT (OUTPATIENT)
Dept: CARDIOLOGY | Facility: MEDICAL CENTER | Age: 53
End: 2018-03-05
Payer: COMMERCIAL

## 2018-03-05 DIAGNOSIS — Z95.5 S/P DRUG ELUTING CORONARY STENT PLACEMENT: ICD-10-CM

## 2018-03-05 LAB — EKG IMPRESSION: NORMAL

## 2018-03-05 PROCEDURE — G0422 INTENS CARDIAC REHAB W/EXERC: HCPCS | Performed by: FAMILY MEDICINE

## 2018-03-05 PROCEDURE — G0423 INTENS CARDIAC REHAB NO EXER: HCPCS | Mod: 59 | Performed by: FAMILY MEDICINE

## 2018-03-05 NOTE — NON-PROVIDER
Benoit Barkley attended the following workshop from 9:00-10:00am.    The topic was: Taking Charge of Stress.     Patient received handouts regarding the specific exercise information.

## 2018-03-07 ENCOUNTER — NON-PROVIDER VISIT (OUTPATIENT)
Dept: CARDIOLOGY | Facility: MEDICAL CENTER | Age: 53
End: 2018-03-07
Payer: COMMERCIAL

## 2018-03-07 DIAGNOSIS — Z95.5 S/P DRUG ELUTING CORONARY STENT PLACEMENT: ICD-10-CM

## 2018-03-07 LAB — EKG IMPRESSION: NORMAL

## 2018-03-07 PROCEDURE — G0422 INTENS CARDIAC REHAB W/EXERC: HCPCS | Performed by: FAMILY MEDICINE

## 2018-03-07 PROCEDURE — G0423 INTENS CARDIAC REHAB NO EXER: HCPCS | Mod: 59 | Performed by: FAMILY MEDICINE

## 2018-03-09 ENCOUNTER — NON-PROVIDER VISIT (OUTPATIENT)
Dept: CARDIOLOGY | Facility: MEDICAL CENTER | Age: 53
End: 2018-03-09
Payer: COMMERCIAL

## 2018-03-09 DIAGNOSIS — Z95.5 S/P DRUG ELUTING CORONARY STENT PLACEMENT: ICD-10-CM

## 2018-03-09 LAB — EKG IMPRESSION: NORMAL

## 2018-03-09 PROCEDURE — G0423 INTENS CARDIAC REHAB NO EXER: HCPCS | Mod: 59 | Performed by: FAMILY MEDICINE

## 2018-03-09 PROCEDURE — G0422 INTENS CARDIAC REHAB W/EXERC: HCPCS | Performed by: FAMILY MEDICINE

## 2018-03-09 NOTE — NON-PROVIDER
Benoit Barkley attending cooking school from 9:00-10:00am.  Today  prepared Marinara Sauce.    Patient received handouts and nutrition information regarding the specific recipes.

## 2018-03-12 ENCOUNTER — NON-PROVIDER VISIT (OUTPATIENT)
Dept: CARDIOLOGY | Facility: MEDICAL CENTER | Age: 53
End: 2018-03-12
Payer: COMMERCIAL

## 2018-03-12 DIAGNOSIS — Z95.5 S/P DRUG ELUTING CORONARY STENT PLACEMENT: ICD-10-CM

## 2018-03-12 LAB — EKG IMPRESSION: NORMAL

## 2018-03-12 PROCEDURE — G0422 INTENS CARDIAC REHAB W/EXERC: HCPCS | Performed by: FAMILY MEDICINE

## 2018-03-12 PROCEDURE — G0423 INTENS CARDIAC REHAB NO EXER: HCPCS | Mod: 59 | Performed by: FAMILY MEDICINE

## 2018-03-12 NOTE — NON-PROVIDER
Benoit Barkley attended: Exercise Workshop from 9:00-10:00am.      The topic was: Core Training.      Patient received handouts regarding the specific exercise information.

## 2018-03-14 ENCOUNTER — NON-PROVIDER VISIT (OUTPATIENT)
Dept: CARDIOLOGY | Facility: MEDICAL CENTER | Age: 53
End: 2018-03-14
Payer: COMMERCIAL

## 2018-03-14 DIAGNOSIS — Z95.5 S/P DRUG ELUTING CORONARY STENT PLACEMENT: ICD-10-CM

## 2018-03-14 LAB — EKG IMPRESSION: NORMAL

## 2018-03-14 PROCEDURE — G0422 INTENS CARDIAC REHAB W/EXERC: HCPCS | Performed by: FAMILY MEDICINE

## 2018-03-14 PROCEDURE — G0423 INTENS CARDIAC REHAB NO EXER: HCPCS | Mod: 59 | Performed by: FAMILY MEDICINE

## 2018-03-19 ENCOUNTER — NON-PROVIDER VISIT (OUTPATIENT)
Dept: CARDIOLOGY | Facility: MEDICAL CENTER | Age: 53
End: 2018-03-19
Payer: COMMERCIAL

## 2018-03-19 DIAGNOSIS — Z95.5 S/P DRUG ELUTING CORONARY STENT PLACEMENT: ICD-10-CM

## 2018-03-19 LAB — EKG IMPRESSION: NORMAL

## 2018-03-19 PROCEDURE — G0422 INTENS CARDIAC REHAB W/EXERC: HCPCS | Performed by: FAMILY MEDICINE

## 2018-03-19 PROCEDURE — G0423 INTENS CARDIAC REHAB NO EXER: HCPCS | Mod: 59 | Performed by: FAMILY MEDICINE

## 2018-03-19 NOTE — PROGRESS NOTES
Cardiac Rehab Individual Treatment Plan Assessment: 30 day 03/19/18 Session #     9   MRN: [id number]   Allergies: Ibuprofen   Patient Name: Benoit Barkley : 1965 Risk Stratification: High    Diagnoses:   1. S/P drug eluting coronary stent placement     Age: 53 y.o. Physician: Regina Jj M.D.    Date of Event: 18 Specialist: Armando ([facility]   Risk Factors:  Hypertension, Hyperlipidemia, Diabetes, Family History, Age, Male > 45   Exercise Nutrition Education Psychosocial   Stages of change Stages of change Stages of change Stages of change   Preparation Preparation Preparation Preparation   Fitness Test Lipids Learning Barriers Outcome Survey Tools   DIST:  (na)  Available: Yes Learning Barriers: Vision FP QOL Overall Score:  (na)   Max HR:  (na) Date: 18 Family Support PHQ-9:  (na)   RPE:  (na) Total: 114 mg/dL Yes Nutrition Screen:  (na)   SPO2:  (na) Tri mg/dL Lives: Spouse Intervention   MET:  (na) HDL: 32 mg/dL Tobacco Use Behavioral Health Consult: Yes   EF= 60 (2018) LDL: 25 mg/dL History   Smoking Status   • Never Smoker   Smokeless Tobacco   • Never Used      Physician Referral: No   Ambulatory Status Diabetes Smoking Intervention Identifies Stressors: Yes   Fall Risk Assessed: Yes Diabetes: Yes Smoking Cessation Referral: N/A Drug Intervention: No   Exercise Ambulatory Status Assist Devices: None HbA1C: 8.5 % Date: 17 Ind. Education / Counseling: N/A Education   Exercise Prescription Monitors BS at Home: Yes Tobacco Adjunct: N/A Psychosocial Education: Coping Techniques, Positive Support System, S/S Depression   Mode: Treadmill, NuStep, UBE, Airdyne   Frequency: couple times a week Random BS: 209 (12/10/2017) Education Intervention Target Goal   Frequency:  3 days/week Weight Management Healthy Heart Education: Class Schedule Given, Medications Reviewed, Patient Education Binder Provided, Risk Factors Discussed, Videos Viewed per Missael  "Assess presence or absence of depression using a valid screening: Yes   Duration:  30-45 Weight: 113.4 kg (250 lb) Target Goal Use Stress Management: Yes   Intensity:  11-13 RPE Height: 193 cm (6' 3.98\") Complete Tobacco Cessation: Complete Tobacco Cessation: N/A Adverse Events: No   METS:  3.0-5.0 BMI (Calculated): 30.44 Educate / Review and have understanding of cardiac disease prevention: Educate / Review and have understanding of cardiac disease prevention: Yes Unexpected Events: No   Progression:  ^ increments of 1-3 to THR/RPE as tolerated Goal weight: 240 Medication Compliance: Yes    THR: THR: Other (see comment) (118-128) History   Alcohol Use No         Angina with Exercise?  Angina with Exercise: No      Resistance Training?  Resistance Training: Yes      Hypertension      Diagnosed with HTN: Yes Intervention      Resting BP: 128/79 Dietician Consult/Class: Pending (scheduled)      Peak Ex BP:  (na) Nurse/Patient Discussion: Yes     Intervention Diabetes Ed Referral: Yes     Home Exercise:  Yes Discuss Maintenance /Wt Loss: Yes     Mode: Walk, Other (duplicate workouts from [employer]) Attend [employer]: Pending (scheduled)     Duration: 30-45 Dietary Goal: >=58 rate your plate, low sodium, low sugar DM2     Frequency: 7 days active  Education     Education Nutrition Education: S&S Hypo/Hyper glycemia, Relate Diabetes to CAD, Healthy Eating, Sodium Reduction     Equipment Orientation, Exercise Safety, S/S to Report, RPE Scale, Warm Up / Cool Down, Physically Active Target Goal     Target Goal LDL-C < 100 if trig. > 200:  Yes       Start Individual Exercise Rx Yes LDL-C < 70 for high risk patient:  Yes       BP < 140/90 or < 130/80 if DM or CKD Yes Non HDL-C Should be < 130:  Yes       Aerobic activity 30 + min / day 5 days / wk Yes HbA1C < 7%: Yes         BMI < 25: No (<29)       Notes:   Exercise:     Current: Benoit is currently walking 6 days a week for 40-60minutes at a time.     Goal: Benoit wants to " start a weight exercise routine at home on the weekends that [facility] will provide him.     Progress: Benoit will add 5-10 minutes to his walks each day.     Diet:     Current: Benoit feels like he is doing well on his diet and doesn't over indulged in anything unhealthy. He rarely treats himself but if he does it is in a small portion. He also is struggling with moving to the [employer] program because he is a diabetic.     Goal: Benoit looks forward to meeting with the dietician to get more eating strategies.     Stress:     Current: Benoit is currently arguing with his wife about their sons responsibilities around the house which is causing his stress. He is also often called to help people from his [employer] avoid their specific addictions and that also causes him stress.     Progress: Benoit removes himself from stressful environments and does some sort of physical activities to reduce stress.

## 2018-03-21 ENCOUNTER — NON-PROVIDER VISIT (OUTPATIENT)
Dept: CARDIOLOGY | Facility: MEDICAL CENTER | Age: 53
End: 2018-03-21
Payer: COMMERCIAL

## 2018-03-21 DIAGNOSIS — Z95.5 S/P DRUG ELUTING CORONARY STENT PLACEMENT: ICD-10-CM

## 2018-03-21 LAB — EKG IMPRESSION: NORMAL

## 2018-03-21 PROCEDURE — G0422 INTENS CARDIAC REHAB W/EXERC: HCPCS | Performed by: FAMILY MEDICINE

## 2018-03-21 PROCEDURE — G0423 INTENS CARDIAC REHAB NO EXER: HCPCS | Mod: 59 | Performed by: FAMILY MEDICINE

## 2018-03-23 ENCOUNTER — NON-PROVIDER VISIT (OUTPATIENT)
Dept: CARDIOLOGY | Facility: MEDICAL CENTER | Age: 53
End: 2018-03-23
Payer: COMMERCIAL

## 2018-03-23 DIAGNOSIS — Z95.5 S/P DRUG ELUTING CORONARY STENT PLACEMENT: ICD-10-CM

## 2018-03-23 LAB — EKG IMPRESSION: NORMAL

## 2018-03-23 PROCEDURE — G0423 INTENS CARDIAC REHAB NO EXER: HCPCS | Mod: 59 | Performed by: FAMILY MEDICINE

## 2018-03-23 PROCEDURE — G0422 INTENS CARDIAC REHAB W/EXERC: HCPCS | Performed by: FAMILY MEDICINE

## 2018-03-23 NOTE — NON-PROVIDER
Benoit Barkley attending cooking school from 9:00-10:00am.   Today  prepared Paella.     Patient received handouts and nutrition information regarding the specific recipes.

## 2018-03-23 NOTE — PROGRESS NOTES
Date: 3/23/2018    30 day Individual Treatment Plan review for the [facility] Int[facility] ([facility]CR) Program.    Benoit Barkley, 53 y.o. male, with qualifying diagnosis/diagnoses of S/P Stent.       Primary Care Provider: Regina Jj M.D.     Heart Specialist (s): Dr. Roberts    Patient has successfully completed 9 Exercise Sessions, and an appropriate number of corresponding Education Sessions.  Patient continues to be an excellent candidate for the [facility]/Pritikin Intensive Cardiac Rehabilitation (ICR) Program.    I will review the Individual Treatment Plan again at 60 day post-initiation of ICR.    Bg Lagos MD, [facility]  , [facility]

## 2018-03-26 ENCOUNTER — NON-PROVIDER VISIT (OUTPATIENT)
Dept: CARDIOLOGY | Facility: MEDICAL CENTER | Age: 53
End: 2018-03-26
Payer: COMMERCIAL

## 2018-03-26 DIAGNOSIS — Z95.5 S/P DRUG ELUTING CORONARY STENT PLACEMENT: ICD-10-CM

## 2018-03-26 LAB — EKG IMPRESSION: NORMAL

## 2018-03-26 PROCEDURE — G0422 INTENS CARDIAC REHAB W/EXERC: HCPCS | Performed by: FAMILY MEDICINE

## 2018-03-26 PROCEDURE — G0423 INTENS CARDIAC REHAB NO EXER: HCPCS | Mod: 59 | Performed by: FAMILY MEDICINE

## 2018-03-26 NOTE — NON-PROVIDER
Benoit Barkley attended: Healthy Mindset Workshop from 9:00-10:00am.    The topic was: Stress.    Patient received handouts regarding the specific exercise information.

## 2018-03-28 ENCOUNTER — NON-PROVIDER VISIT (OUTPATIENT)
Dept: CARDIOLOGY | Facility: MEDICAL CENTER | Age: 53
End: 2018-03-28
Payer: COMMERCIAL

## 2018-03-28 DIAGNOSIS — Z95.5 S/P DRUG ELUTING CORONARY STENT PLACEMENT: ICD-10-CM

## 2018-03-28 LAB — EKG IMPRESSION: NORMAL

## 2018-03-28 PROCEDURE — G0422 INTENS CARDIAC REHAB W/EXERC: HCPCS | Performed by: FAMILY MEDICINE

## 2018-03-28 PROCEDURE — G0423 INTENS CARDIAC REHAB NO EXER: HCPCS | Mod: 59 | Performed by: FAMILY MEDICINE

## 2018-03-28 NOTE — NON-PROVIDER
"  Nutrition Consult Note:    Eliza Perry  Date & Time note created:    3/28/2018   11:10 AM     Referring MD:   No ref. provider found  Time: 10:00-11:00am     Patient ID:   Name:             Benoit Barkley   YOB: 1965  Age:                 53 y.o.  male   MRN:               [id number]        Benoit Barkley is here today for Intensive Cardiac Rehab.  This program includes Nutrition Education and Counseling following the Pritikin guidelines which includes at least 5 servings of vegetables, 4 servings of fruit, 2 or less servings of dairy, at least 5 servings of whole grains, animal fat from fish/lean protein only and no added salt (goal 1200-1500mg sodium per day).        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 He consumes is consuming a traditional \"diabetic diet\" [facility]     Current Weight:   247lbs      IBWR:   202 lbs +/-10%                            Current Living SItuation:  With spouse     Comprehensive Nutrition Education Instruction or training leading to in-depth nutrition related knowledge about:   Portion control  Discussed grocery shopping tips and label reading  Meal planning  Culinary instruction, including cooking without salt or added fat  The effect of sodium and saturated fats on heart disease risk factors   Identified ways to self monitor diet - food journaling  Reviewed benefits of exercise and helped pt set exercise goals      VIDEOS VIEWED:  4/16 core videos.    Past Medical History:   Past Medical History:   Diagnosis Date   • Acute coronary syndrome (CMS-HCC) 12/10/2017   • Arthritis    • Asthma    • CAD (coronary artery disease)    • Diabetes (CMS-HCC)     oral medication   • Dyslipidemia 12/8/2017   • Hypertension    • NSTEMI (non-ST elevated myocardial infarction) (CMS-HCC) 12/8/2017   • Pain        Past Surgical History:  Past Surgical History:   Procedure Laterality Date   • CARDIAC CATH  12/09/2017    ALICIA to " Circ,  RCA with collaterals.   • KNEE ARTHROSCOPY Left 1989   • KNEE ARTHROSCOPY Right 1988   • ANKLE ARTHROSCOPY Right    • ARTHROSCOPY, KNEE Bilateral    • HAND SURGERY Right    • HIP ARTHROPLASTY TOTAL Left    • HIP ARTHROSCOPY Left    • NERVE ULNAR TRANSFER Right    • OTHER      neck   • OTHER Bilateral     2 left shoulder surgeries 3 right shoulder surgeries   • OTHER      lower back   • SHOULDER ARTHROSCOPY Left    • SHOULDER ARTHROSCOPY Right        Current Outpatient Medications:  Current Outpatient Prescriptions   Medication Sig Dispense Refill   • losartan (COZAAR) 25 MG Tab Take 50 mg by mouth every day. 30 Tab 11   • isosorbide mononitrate SR (IMDUR) 30 MG TABLET SR 24 HR Take 1 Tab by mouth every morning. 30 Tab 5   • nitroglycerin (NITROSTAT) 0.4 MG SL Tab Place 1 Tab under tongue as needed for Chest Pain. 25 Tab 2   • prasugrel (EFFIENT) 10 MG Tab Take 10 mg by mouth every morning.     • Omega-3 Fatty Acids (EQL OMEGA 3 FISH OIL) 1400 MG Cap Take 2 Tabs by mouth 2 Times a Day.     • vitamin D (CHOLECALCIFEROL) 1000 UNIT Tab Take 1,000 Units by mouth 2 Times a Day.     • acetaminophen (TYLENOL) 325 MG Tab Take 650 mg by mouth every four hours as needed.     • atorvastatin (LIPITOR) 80 MG tablet Take 1 Tab by mouth every evening. 90 Tab 3   • metoprolol (LOPRESSOR) 25 MG Tab Take 1 Tab by mouth 2 times a day. 180 Tab 3   • acetaminophen/caffeine/butalbital 325-40-50 mg (FIORICET) -40 MG Tab Take 1 Tab by mouth every 6 hours as needed for Headache or Migraine. 30 Tab 1   • albuterol 108 (90 Base) MCG/ACT Aero Soln inhalation aerosol Inhale 2 Puffs by mouth every four hours as needed for Shortness of Breath.     • metformin (GLUCOPHAGE) 1000 MG tablet Take 1,000 mg by mouth 2 times a day, with meals.     • aspirin EC (ECOTRIN) 81 MG Tablet Delayed Response Take 81 mg by mouth every bedtime.     • multivitamin (THERAGRAN) Tab Take 1 Tab by mouth every morning.       No current  facility-administered medications for this visit.          Allergies:  Allergies   Allergen Reactions   • Ibuprofen      Stomach pains...       Family History:  Family History   Problem Relation Age of Onset   • Heart Disease Mother 50     CABG   • Heart Attack Father    • Heart Disease Father    • Heart Disease Maternal Grandmother 42     heart attack       Social History:  Social History     Social History   • Marital status:      Spouse name: N/A   • Number of children: N/A   • Years of education: N/A     Occupational History   • Not on file.     Social History Main Topics   • Smoking status: Never Smoker   • Smokeless tobacco: Never Used   • Alcohol use No   • Drug use: Yes      Comment: CBD oil topical, THC    • Sexual activity: Not on file     Other Topics Concern   • Not on file     Social History Narrative   • No narrative on file       Strengths:  Pt is a trained  with strong family support    Barriers:   Diabetes     Goals set at this visit:  1. Lose 1-2 lbs per week to lower HbA1c   2. Bike 4 times per week  3. Patient would like to see his medication dosage reduced in 6 weeks     Follow up:  As needed.

## 2018-04-06 ENCOUNTER — NON-PROVIDER VISIT (OUTPATIENT)
Dept: CARDIOLOGY | Facility: MEDICAL CENTER | Age: 53
End: 2018-04-06
Payer: COMMERCIAL

## 2018-04-06 DIAGNOSIS — Z95.5 S/P DRUG ELUTING CORONARY STENT PLACEMENT: ICD-10-CM

## 2018-04-06 LAB — EKG IMPRESSION: NORMAL

## 2018-04-06 PROCEDURE — G0422 INTENS CARDIAC REHAB W/EXERC: HCPCS | Performed by: FAMILY MEDICINE

## 2018-04-06 PROCEDURE — G0423 INTENS CARDIAC REHAB NO EXER: HCPCS | Mod: 59 | Performed by: FAMILY MEDICINE

## 2018-04-06 NOTE — NON-PROVIDER
Benoit Barkley attending cooking school from  9:00-10:00am.  Today  prepared Borscht.     Patient received handouts and nutrition information regarding the specific recipes.

## 2018-04-09 ENCOUNTER — NON-PROVIDER VISIT (OUTPATIENT)
Dept: CARDIOLOGY | Facility: MEDICAL CENTER | Age: 53
End: 2018-04-09
Payer: COMMERCIAL

## 2018-04-09 DIAGNOSIS — Z95.5 S/P DRUG ELUTING CORONARY STENT PLACEMENT: ICD-10-CM

## 2018-04-09 LAB — EKG IMPRESSION: NORMAL

## 2018-04-09 PROCEDURE — G0423 INTENS CARDIAC REHAB NO EXER: HCPCS | Mod: 59 | Performed by: FAMILY MEDICINE

## 2018-04-09 PROCEDURE — G0422 INTENS CARDIAC REHAB W/EXERC: HCPCS | Performed by: FAMILY MEDICINE

## 2018-04-09 NOTE — NON-PROVIDER
Benoit Barkley attended: Exercise Workshop from 9:00-9:40am.   The topic was: Biomechanical Limitations.       Patient received handouts regarding the specific exercise information.

## 2018-04-13 ENCOUNTER — NON-PROVIDER VISIT (OUTPATIENT)
Dept: CARDIOLOGY | Facility: MEDICAL CENTER | Age: 53
End: 2018-04-13

## 2018-04-13 NOTE — PROGRESS NOTES
Cardiac Rehab Individual Treatment Plan Assessment: 60 day 18 Session #     15   MRN: [id number]   Allergies: Ibuprofen   Patient Name: Benoit Barkley : 1965 Risk Stratification: High    Diagnoses: No diagnosis found. Age: 53 y.o. Physician: Regina Jj M.D.    Date of Event: 18 Specialist: Armando   Risk Factors:  Hypertension, Hyperlipidemia, Diabetes, Family History, Age, Male > 45   Exercise Nutrition Education Psychosocial   Stages of change Stages of change Stages of change Stages of change   Preparation (Patient is not present today.) Preparation   Preparation   Fitness Test Lipids Learning Barriers Outcome Survey Tools   DIST:            Max HR:   Date:   Family Support     RPE:   Total:         SPO2:   Trig:     Intervention   MET:   HDL:   Tobacco Use     EF=   LDL:   History   Smoking Status   • Never Smoker   Smokeless Tobacco   • Never Used          Ambulatory Status Diabetes Smoking Intervention                       Education   Exercise Prescription                 Education Intervention Target Goal   Frequency:    Weight Management       Duration:      Target Goal     Intensity:      Complete Tobacco Cessation:       METS:      Educate / Review and have understanding of cardiac disease prevention:       Progression:           THR:   History   Alcohol Use No         Angina with Exercise?         Resistance Training?         Hypertension        Intervention                       Intervention       Home Exercise:          Mode:         Duration:         Frequency:   Education     Education         Target Goal     Target Goal LDL-C < 100 if trig. > 200:          Start Individual Exercise Rx   LDL-C < 70 for high risk patient:          BP < 140/90 or < 130/80 if DM or CKD   Non HDL-C Should be < 130:          Aerobic activity 30 + min / day 5 days / wk                     Notes: Benoit called in sick today with a chest cold.  We will review his goals and progress with him  when he returns and forward a progress note to Dr. Lagos at that time.

## 2018-04-15 NOTE — PROGRESS NOTES
Patient not present for ITP today, so ITP review will be postponed until ITP performed.  Sincerely,  Bg Lagos MD

## 2018-04-16 ENCOUNTER — NON-PROVIDER VISIT (OUTPATIENT)
Dept: CARDIOLOGY | Facility: MEDICAL CENTER | Age: 53
End: 2018-04-16
Payer: COMMERCIAL

## 2018-04-16 DIAGNOSIS — Z95.5 S/P DRUG ELUTING CORONARY STENT PLACEMENT: ICD-10-CM

## 2018-04-16 LAB — EKG IMPRESSION: NORMAL

## 2018-04-16 PROCEDURE — G0423 INTENS CARDIAC REHAB NO EXER: HCPCS | Mod: 59 | Performed by: FAMILY MEDICINE

## 2018-04-16 PROCEDURE — G0422 INTENS CARDIAC REHAB W/EXERC: HCPCS | Performed by: FAMILY MEDICINE

## 2018-04-16 NOTE — NON-PROVIDER
Benoit Barkley attended: Healthy Mindset Workshop from 9:00-10:00am.      The topic was: New Thoughts/New Behaviors.    Patient received handouts regarding the specific exercise information.

## 2018-04-16 NOTE — PROGRESS NOTES
This is a follow-up progress note from Benoit's 60 day review as he was out of town.  Benoit is walking at least 60 minutes, 6 days a week.  He also met his goal of adding a home weight routine at least once or twice a week.  He has no new goals for exercise at this time other than increasing levels in ICR.  He states he continues to do well with his diet.  He struggles with diet somewhat when he travels.  He is trying to pack healthy snacks of vegetables to keep with him so he stays full.  His goal is to continue with finding options for plant based protein.  He met with the dietician which he says was helpful and one of his prior goals.  As far as stress goes he states he is pretty mellow and can honestly say that his stress comes from dealing with his arthritic pain.  He has an appointment to try injections for his hips.  Over all he feels like everything is going well.

## 2018-04-18 ENCOUNTER — NON-PROVIDER VISIT (OUTPATIENT)
Dept: CARDIOLOGY | Facility: MEDICAL CENTER | Age: 53
End: 2018-04-18
Payer: COMMERCIAL

## 2018-04-18 DIAGNOSIS — Z95.5 S/P DRUG ELUTING CORONARY STENT PLACEMENT: ICD-10-CM

## 2018-04-18 LAB — EKG IMPRESSION: NORMAL

## 2018-04-18 PROCEDURE — G0423 INTENS CARDIAC REHAB NO EXER: HCPCS | Mod: 59 | Performed by: FAMILY MEDICINE

## 2018-04-18 PROCEDURE — G0422 INTENS CARDIAC REHAB W/EXERC: HCPCS | Performed by: FAMILY MEDICINE

## 2018-04-20 ENCOUNTER — NON-PROVIDER VISIT (OUTPATIENT)
Dept: CARDIOLOGY | Facility: MEDICAL CENTER | Age: 53
End: 2018-04-20
Payer: COMMERCIAL

## 2018-04-20 DIAGNOSIS — Z95.5 S/P DRUG ELUTING CORONARY STENT PLACEMENT: ICD-10-CM

## 2018-04-20 LAB — EKG IMPRESSION: NORMAL

## 2018-04-20 PROCEDURE — G0423 INTENS CARDIAC REHAB NO EXER: HCPCS | Mod: 59 | Performed by: FAMILY MEDICINE

## 2018-04-20 PROCEDURE — G0422 INTENS CARDIAC REHAB W/EXERC: HCPCS | Performed by: FAMILY MEDICINE

## 2018-05-01 ENCOUNTER — NON-PROVIDER VISIT (OUTPATIENT)
Dept: CARDIOLOGY | Facility: MEDICAL CENTER | Age: 53
End: 2018-05-01
Payer: COMMERCIAL

## 2018-05-01 DIAGNOSIS — Z95.5 S/P DRUG ELUTING CORONARY STENT PLACEMENT: ICD-10-CM

## 2018-05-01 LAB — EKG IMPRESSION: NORMAL

## 2018-05-01 PROCEDURE — G0423 INTENS CARDIAC REHAB NO EXER: HCPCS | Mod: 59 | Performed by: FAMILY MEDICINE

## 2018-05-01 PROCEDURE — G0422 INTENS CARDIAC REHAB W/EXERC: HCPCS | Performed by: FAMILY MEDICINE

## 2018-05-07 ENCOUNTER — NON-PROVIDER VISIT (OUTPATIENT)
Dept: CARDIOLOGY | Facility: MEDICAL CENTER | Age: 53
End: 2018-05-07
Payer: COMMERCIAL

## 2018-05-07 DIAGNOSIS — Z95.5 S/P DRUG ELUTING CORONARY STENT PLACEMENT: ICD-10-CM

## 2018-05-07 LAB — EKG IMPRESSION: NORMAL

## 2018-05-07 PROCEDURE — G0423 INTENS CARDIAC REHAB NO EXER: HCPCS | Mod: 59 | Performed by: FAMILY MEDICINE

## 2018-05-07 PROCEDURE — G0422 INTENS CARDIAC REHAB W/EXERC: HCPCS | Performed by: FAMILY MEDICINE

## 2018-05-07 ASSESSMENT — PATIENT HEALTH QUESTIONNAIRE - PHQ9
1. LITTLE INTEREST OR PLEASURE IN DOING THINGS: 0
9. THOUGHTS THAT YOU WOULD BE BETTER OFF DEAD, OR OF HURTING YOURSELF: 0
2. FEELING DOWN, DEPRESSED, IRRITABLE, OR HOPELESS: 0
1. LITTLE INTEREST OR PLEASURE IN DOING THINGS: 0
6. FEELING BAD ABOUT YOURSELF - OR THAT YOU ARE A FAILURE OR HAVE LET YOURSELF OR YOUR FAMILY DOWN: 0
5. POOR APPETITE OR OVEREATING: 0
7. TROUBLE CONCENTRATING ON THINGS, SUCH AS READING THE NEWSPAPER OR WATCHING TELEVISION: 0
6. FEELING BAD ABOUT YOURSELF - OR THAT YOU ARE A FAILURE OR HAVE LET YOURSELF OR YOUR FAMILY DOWN: 0
SUM OF ALL RESPONSES TO PHQ QUESTIONS 1-9: 0
3. TROUBLE FALLING OR STAYING ASLEEP OR SLEEPING TOO MUCH: 0
SUM OF ALL RESPONSES TO PHQ9 QUESTIONS 1 AND 2: 0
3. TROUBLE FALLING OR STAYING ASLEEP OR SLEEPING TOO MUCH: 0
4. FEELING TIRED OR HAVING LITTLE ENERGY: 0
SUM OF ALL RESPONSES TO PHQ QUESTIONS 1-9: 0
7. TROUBLE CONCENTRATING ON THINGS, SUCH AS READING THE NEWSPAPER OR WATCHING TELEVISION: 0
4. FEELING TIRED OR HAVING LITTLE ENERGY: 0
SUM OF ALL RESPONSES TO PHQ9 QUESTIONS 1 AND 2: 0
5. POOR APPETITE OR OVEREATING: 0
9. THOUGHTS THAT YOU WOULD BE BETTER OFF DEAD, OR OF HURTING YOURSELF: 0
2. FEELING DOWN, DEPRESSED, IRRITABLE, OR HOPELESS: 0
8. MOVING OR SPEAKING SO SLOWLY THAT OTHER PEOPLE COULD HAVE NOTICED. OR THE OPPOSITE, BEING SO FIGETY OR RESTLESS THAT YOU HAVE BEEN MOVING AROUND A LOT MORE THAN USUAL: 0
SUM OF ALL RESPONSES TO PHQ QUESTIONS 1-9: 0
8. MOVING OR SPEAKING SO SLOWLY THAT OTHER PEOPLE COULD HAVE NOTICED. OR THE OPPOSITE, BEING SO FIGETY OR RESTLESS THAT YOU HAVE BEEN MOVING AROUND A LOT MORE THAN USUAL: 0

## 2018-05-07 NOTE — PROGRESS NOTES
Benoit Barkley attended: Healthy Mindset Workshop from 9:00-10:00 am.     The topic was: Managing Moods and Relationships.    Patient received handouts regarding the specific exercise information.

## 2018-05-07 NOTE — Clinical Note
Resent. I  investigated the sessions and he was at 20 not 15. He had to graduate early due to insurance reasons. Could you please resign. This was an ITP that had too many hands in the pot. In the future I will say why they are graduating before 72 sessions.

## 2018-05-09 NOTE — PROGRESS NOTES
Cardiac Rehab Individual Treatment Plan Assessment: Discharge 18 Session #     15   MRN: [id number]   Allergies: Ibuprofen   Patient Name: Benoit Barkley : 1965 Risk Stratification: High    Diagnoses:   1. S/P drug eluting coronary stent placement     Age: 53 y.o. Physician: Regina Jj M.D.    Date of Event: 18 Specialist: Armando   Risk Factors:  Hypertension, Hyperlipidemia, Diabetes, Family History, Age, Male > 45   Exercise Nutrition Education Psychosocial   Stages of change Stages of change Stages of change Stages of change   Action Action Action Action   Fitness Test Lipids Learning Barriers Outcome Survey Tools   DIST: 1453  Available: No new Learning Barriers: Vision FP QOL Overall Score: 28.13   Max HR: 86 Date: 18 Family Support PHQ-9: 0   RPE: 8 Total: 114 mg/dL Yes Nutrition Screen: 69 %   SPO2: 97 % Tri mg/dL Lives: Spouse Intervention   MET: 4.08 HDL: 32 mg/dL Tobacco Use Behavioral Health Consult: Yes   EF= 60 (2018) LDL: 25 mg/dL History   Smoking Status   • Never Smoker   Smokeless Tobacco   • Never Used      Physician Referral: No   Ambulatory Status Diabetes Smoking Intervention Identifies Stressors: Yes   Fall Risk Assessed: Yes Diabetes: Yes Smoking Cessation Referral: N/A Drug Intervention: No   Exercise Ambulatory Status Assist Devices: None HbA1C: 8.5 % Date: 17 Ind. Education / Counseling: N/A Education   Exercise Prescription Monitors BS at Home: Yes Tobacco Adjunct: N/A Psychosocial Education: Coping Techniques, Positive Support System, S/S Depression   Mode:  (Graduate to home exercise )   Frequency: 2 times a week Random BS: 209 (12/10/2017) Education Intervention Target Goal   Frequency:  3 days/week Weight Management Healthy Heart Education: Class Schedule Given, Medications Reviewed, Patient Education Binder Provided, Risk Factors Discussed, Videos Viewed per Missael Assess presence or absence of depression using a valid  "screening: Yes   Duration:  30-45 Weight: 110.7 kg (244 lb) Target Goal Use Stress Management: Yes   Intensity:  11-13 RPE Height: 193 cm (6' 3.98\") Complete Tobacco Cessation: Complete Tobacco Cessation: N/A Adverse Events: No   METS:  3.0-5.0 BMI (Calculated): 29.71 Educate / Review and have understanding of cardiac disease prevention: Educate / Review and have understanding of cardiac disease prevention: Yes Unexpected Events: No   Progression:  ^ increments of 1-3 to THR/RPE as tolerated Goal weight: 240 Medication Compliance: Yes    THR: THR: Other (see comment) (118-128) History   Alcohol Use No         Angina with Exercise?  Angina with Exercise: No      Resistance Training?  Resistance Training: Yes      Hypertension      Diagnosed with HTN: Yes Intervention      Resting BP: 103/54 Dietician Consult/Class: Pending (scheduled)      Peak Ex BP:  (na) Nurse/Patient Discussion: Yes     Intervention Diabetes Ed Referral: Yes     Home Exercise:  Yes Discuss Maintenance /Wt Loss: Yes     Mode: Walk, Other (duplicate workouts from [facility]) Attend [employer]: Pending (scheduled)     Duration: 30-60 Dietary Goal: >=58 rate your plate, low sodium, low sugar DM2     Frequency: 7 days active  Education     Education Nutrition Education: S&S Hypo/Hyper glycemia, Relate Diabetes to CAD, Healthy Eating, Sodium Reduction     Equipment Orientation, Exercise Safety, S/S to Report, RPE Scale, Warm Up / Cool Down, Physically Active Target Goal     Target Goal LDL-C < 100 if trig. > 200:  Yes       Start Individual Exercise Rx Yes LDL-C < 70 for high risk patient:  Yes       BP < 140/90 or < 130/80 if DM or CKD Yes Non HDL-C Should be < 130:  Yes       Aerobic activity 30 + min / day 5 days / wk Yes HbA1C < 7%: Yes         BMI < 25: No (<29)       Notes:   Benoit graduates today.      Exercise current:   Benoit is walking 45 minutes on the days that he is not here.  He is currently doing a weights routine in cardiac rehab and is " successfully completing 30 minutes of aerobic exercise and has increased his resistance greatly since starting.      Goal: he plans on losing more weight and would like to be 240.   Currently (247).  He is going to get some gym equipment for home to continue his exercise program and will start a weight program at home as well.  He is also going to get a punching bag and plans to teach his daughter some boxing too.      Nutrition   current: he thinks he is going pretty well, not 100% Pritikin but has made several changes in his diet.  He says that when he was treats he has small portions.      Goal: continue with the healthy choices that he has made since starting th program.  He would like to try some more of the Pritikin recipes.      Stress current:   has some stress with moving.   Goal: he uses exercise to reduce stress and feels this is effective, he would like to continue that.  He feels overall that the program has been very helpful for him and was happy with the program overall.

## 2018-05-10 NOTE — PROGRESS NOTES
Date: 5/9/2018    DIscharge Individual Treatment Plan review for the [facility] Intensive Cardiac Rehabilitation (ICR) Program.    Benoit Barkley, 53 y.o. male, with qualifying diagnosis/diagnoses of S/P Stent.       Primary Care Provider: Regina Jj M.D.     Heart Specialist (s): Dr. Roberts    Patient has successfully completed 15 Exercise Sessions, and an appropriate number of corresponding Education Sessions.  Patient has been an excellent candidate [facility].    Bg Lagos MD, [facility]  , [facility]

## 2018-05-11 ASSESSMENT — PATIENT HEALTH QUESTIONNAIRE - PHQ9: SUM OF ALL RESPONSES TO PHQ QUESTIONS 1-9: 0

## 2018-05-11 NOTE — PROGRESS NOTES
Cardiac Rehab Individual Treatment Plan Assessment: Discharge 18 Session #     20   MRN: [id number]   Allergies: Ibuprofen   Patient Name: Benoit Barkley : 1965 Risk Stratification: High    Diagnoses:   1. S/P drug eluting coronary stent placement     Age: 53 y.o. Physician: Regina Jj M.D.    Date of Event: 18 Specialist: Armando   Risk Factors:  Hypertension, Hyperlipidemia, Diabetes, Family History, Age, Male > 45   Exercise Nutrition Education Psychosocial   Stages of change Stages of change Stages of change Stages of change   Action Action Action Action   Fitness Test Lipids Learning Barriers Outcome Survey Tools   DIST: 1453  Available: No new Learning Barriers: Vision FP QOL Overall Score: 28.13   Max HR: 86 Date: 18 Family Support PHQ-9: 0   RPE: 8 Total: 114 mg/dL Yes Nutrition Screen: 69 %   SPO2: 97 % Tri mg/dL Lives: Spouse Intervention   MET: 4.08 HDL: 32 mg/dL Tobacco Use Behavioral Health Consult: Yes   EF= 60 (2018) LDL: 25 mg/dL History   Smoking Status   • Never Smoker   Smokeless Tobacco   • Never Used      Physician Referral: No   Ambulatory Status Diabetes Smoking Intervention Identifies Stressors: Yes   Fall Risk Assessed: Yes Diabetes: Yes Smoking Cessation Referral: N/A Drug Intervention: No   Exercise Ambulatory Status Assist Devices: None HbA1C: 8.5 % Date: 17 Ind. Education / Counseling: N/A Education   Exercise Prescription Monitors BS at Home: Yes Tobacco Adjunct: N/A Psychosocial Education: Coping Techniques, Positive Support System, S/S Depression   Mode:  (Graduate to home exercise )   Frequency: 2 times a week Random BS: 209 (12/10/2017) Education Intervention Target Goal   Frequency:  3 days/week Weight Management Healthy Heart Education: Class Schedule Given, Medications Reviewed, Patient Education Binder Provided, Risk Factors Discussed, Videos Viewed per Missael Assess presence or absence of depression using a valid  "screening: Yes   Duration:  30-45 Weight: 110.7 kg (244 lb) Target Goal Use Stress Management: Yes   Intensity:  11-13 RPE Height: 193 cm (6' 3.98\") Complete Tobacco Cessation: Complete Tobacco Cessation: N/A Adverse Events: No   METS:  3.0-5.0 BMI (Calculated): 29.71 Educate / Review and have understanding of cardiac disease prevention: Educate / Review and have understanding of cardiac disease prevention: Yes Unexpected Events: No   Progression:  ^ increments of 1-3 to THR/RPE as tolerated Goal weight: 240 Medication Compliance: Yes    THR: THR: Other (see comment) (118-128) History   Alcohol Use No         Angina with Exercise?  Angina with Exercise: No      Resistance Training?  Resistance Training: Yes      Hypertension      Diagnosed with HTN: Yes Intervention      Resting BP: 103/54 Dietician Consult/Class: Pending (scheduled)      Peak Ex BP:  (na) Nurse/Patient Discussion: Yes     Intervention Diabetes Ed Referral: Yes     Home Exercise:  Yes Discuss Maintenance /Wt Loss: Yes     Mode: Walk, Other (duplicate workouts from [facility]) Attend [employer]: Pending (scheduled)     Duration: 30-60 Dietary Goal: >=58 rate your plate, low sodium, low sugar DM2     Frequency: 7 days active  Education     Education Nutrition Education: S&S Hypo/Hyper glycemia, Relate Diabetes to CAD, Healthy Eating, Sodium Reduction     Equipment Orientation, Exercise Safety, S/S to Report, RPE Scale, Warm Up / Cool Down, Physically Active Target Goal     Target Goal LDL-C < 100 if trig. > 200:  Yes       Start Individual Exercise Rx Yes LDL-C < 70 for high risk patient:  Yes       BP < 140/90 or < 130/80 if DM or CKD Yes Non HDL-C Should be < 130:  Yes       Aerobic activity 30 + min / day 5 days / wk Yes HbA1C < 7%: Yes         BMI < 25: No (<29)       Notes:   Benoit graduates today due to insurance requirements.      Exercise current:   Benoit is walking 45 minutes on the days that he is not here.  He is currently doing a weights " routine in cardiac rehab and is successfully completing 30 minutes of aerobic exercise and has increased his resistance greatly since starting.      Goal: he plans on losing more weight and would like to be 240.   Currently (247).  He is going to get some gym equipment for home to continue his exercise program and will start a weight program at home as well.  He is also going to get a punching bag and plans to teach his daughter some boxing too.      Nutrition current:   he thinks he is going pretty well, not 100% Pritikin but has made several changes in his diet.  He says that when he was treats he has small portions.      Goal: continue with the healthy choices that he has made since starting th program.  He would like to try some more of the Pritikin recipes.      Stress current:   has some stress with moving.     Goal: he uses exercise to reduce stress and feels this is effective, he would like to continue that.  He feels overall that the program has been very helpful for him and was happy with the program overall.

## 2018-05-14 NOTE — PROGRESS NOTES
Date: 5/9/2018     DIscharge Individual Treatment Plan review for the [facility] Intensive Cardiac Rehabilitation (ICR) Program.     Benoit Barkley, 53 y.o. male, with qualifying diagnosis/diagnoses of S/P Stent.       Primary Care Provider: Regina Jj M.D.     Heart Specialist (s): Dr. Roberts     Patient has successfully completed 20 Exercise Sessions, and an appropriate number of corresponding Education Sessions.  Patient has been an excellent candidate [facility].     Bg Lagos MD, [facility]  , [facility]

## 2018-05-22 ENCOUNTER — OFFICE VISIT (OUTPATIENT)
Dept: CARDIOLOGY | Facility: MEDICAL CENTER | Age: 53
End: 2018-05-22
Payer: COMMERCIAL

## 2018-05-22 VITALS
BODY MASS INDEX: 29.83 KG/M2 | HEIGHT: 76 IN | OXYGEN SATURATION: 95 % | SYSTOLIC BLOOD PRESSURE: 110 MMHG | DIASTOLIC BLOOD PRESSURE: 60 MMHG | WEIGHT: 245 LBS | HEART RATE: 66 BPM

## 2018-05-22 DIAGNOSIS — E78.5 DYSLIPIDEMIA: ICD-10-CM

## 2018-05-22 DIAGNOSIS — I10 ESSENTIAL HYPERTENSION: ICD-10-CM

## 2018-05-22 DIAGNOSIS — I25.10 CORONARY ARTERY DISEASE INVOLVING NATIVE CORONARY ARTERY OF NATIVE HEART WITHOUT ANGINA PECTORIS: ICD-10-CM

## 2018-05-22 DIAGNOSIS — Z95.5 S/P DRUG ELUTING CORONARY STENT PLACEMENT: ICD-10-CM

## 2018-05-22 PROCEDURE — 99213 OFFICE O/P EST LOW 20 MIN: CPT | Performed by: INTERNAL MEDICINE

## 2018-05-22 ASSESSMENT — ENCOUNTER SYMPTOMS
MYALGIAS: 0
ABDOMINAL PAIN: 0
DEPRESSION: 0
BRUISES/BLEEDS EASILY: 0
NERVOUS/ANXIOUS: 0
PALPITATIONS: 0
SHORTNESS OF BREATH: 0
VOMITING: 0
NAUSEA: 0
EYE DISCHARGE: 0
LOSS OF CONSCIOUSNESS: 0
BLURRED VISION: 0
EYE PAIN: 0
SPEECH CHANGE: 0
COUGH: 0

## 2018-05-22 NOTE — LETTER
[facility]   [address] [place] [address]  [place] [address]  Phone: [phone]  Fax: [phone]              Benoit Barkley  1965    Encounter Date: 5/22/2018    Shabnam Roberts M.D.          PROGRESS NOTE:  No notes on file      No Recipients

## 2018-05-22 NOTE — PROGRESS NOTES
"Chief Complaint   Patient presents with   • Coronary Artery Disease     follow up   Prior angina    Subjective:   Benoit Barkley is a 53 y.o. male who presents today for f/u CAD, prior coronary stent for  RCA    Feeling much better last few months  Denies angina or HF symptoms. 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  Plan to get his weight down to 230 lbs  [facility]  \"[facility]\"    Past Medical History:   Diagnosis Date   • Acute coronary syndrome ([facility]) 12/10/2017   • Arthritis    • Asthma    • CAD (coronary artery disease)    • Diabetes ([facility])     oral medication   • Dyslipidemia 12/8/2017   • Hypertension    • NSTEMI (non-ST elevated myocardial infarction) ([facility]) 12/8/2017   • Pain      Past Surgical History:   Procedure Laterality Date   • CARDIAC CATH  12/09/2017    ALICIA to Circ,  RCA with collaterals.   • KNEE ARTHROSCOPY Left 1989   • KNEE ARTHROSCOPY Right 1988   • ANKLE ARTHROSCOPY Right    • ARTHROSCOPY, KNEE Bilateral    • HAND SURGERY Right    • HIP ARTHROPLASTY TOTAL Left    • HIP ARTHROSCOPY Left    • NERVE ULNAR TRANSFER Right    • OTHER      neck   • OTHER Bilateral     2 left shoulder surgeries 3 right shoulder surgeries   • OTHER      lower back   • SHOULDER ARTHROSCOPY Left    • SHOULDER ARTHROSCOPY Right      Family History   Problem Relation Age of Onset   • Heart Disease Mother 50     CABG   • Heart Attack Father    • Heart Disease Father    • Heart Disease Maternal Grandmother 42     heart attack     Social History     Social History   • Marital status:      Spouse name: N/A   • Number of children: N/A   • Years of education: N/A     Occupational History   • Not on file.     Social History Main Topics   • Smoking status: Never Smoker   • Smokeless tobacco: Never Used   • Alcohol use No   • Drug use: Yes      Comment: CBD oil topical, THC    • Sexual activity: Not on file     Other Topics " Concern   • Not on file     Social History Narrative   • No narrative on file     Allergies   Allergen Reactions   • Ibuprofen      Stomach pains...     Outpatient Encounter Prescriptions as of 5/22/2018   Medication Sig Dispense Refill   • Empagliflozin 25 MG Tab Take  by mouth.     • losartan (COZAAR) 25 MG Tab Take 25 mg by mouth every day. 30 Tab 11   • nitroglycerin (NITROSTAT) 0.4 MG SL Tab Place 1 Tab under tongue as needed for Chest Pain. 25 Tab 2   • prasugrel (EFFIENT) 10 MG Tab Take 10 mg by mouth every morning.     • Omega-3 Fatty Acids (EQL OMEGA 3 FISH OIL) 1400 MG Cap Take 2 Tabs by mouth 2 Times a Day.     • vitamin D (CHOLECALCIFEROL) 1000 UNIT Tab Take 1,000 Units by mouth 2 Times a Day.     • acetaminophen (TYLENOL) 325 MG Tab Take 650 mg by mouth every four hours as needed.     • atorvastatin (LIPITOR) 80 MG tablet Take 1 Tab by mouth every evening. 90 Tab 3   • metoprolol (LOPRESSOR) 25 MG Tab Take 1 Tab by mouth 2 times a day. 180 Tab 3   • albuterol 108 (90 Base) MCG/ACT Aero Soln inhalation aerosol Inhale 2 Puffs by mouth every four hours as needed for Shortness of Breath.     • metformin (GLUCOPHAGE) 1000 MG tablet Take 1,000 mg by mouth 2 times a day, with meals.     • aspirin EC (ECOTRIN) 81 MG Tablet Delayed Response Take 81 mg by mouth every bedtime.     • multivitamin (THERAGRAN) Tab Take 1 Tab by mouth every morning.     • [DISCONTINUED] isosorbide mononitrate SR (IMDUR) 30 MG TABLET SR 24 HR Take 1 Tab by mouth every morning. (Patient not taking: Reported on 5/22/2018) 30 Tab 5   • acetaminophen/caffeine/butalbital 325-40-50 mg (FIORICET) -40 MG Tab Take 1 Tab by mouth every 6 hours as needed for Headache or Migraine. 30 Tab 1     No facility-administered encounter medications on file as of 5/22/2018.      Review of Systems   Constitutional:        Able to lose 15 lbs last few months   HENT: Negative for congestion.    Eyes: Negative for blurred vision, pain and discharge.  "  Respiratory: Negative for cough and shortness of breath.    Cardiovascular: Negative for chest pain, palpitations and leg swelling.   Gastrointestinal: Negative for abdominal pain, nausea and vomiting.   Musculoskeletal: Negative for myalgias.   Skin: Negative for rash.   Neurological: Negative for speech change and loss of consciousness.   Endo/Heme/Allergies: Does not bruise/bleed easily.   Psychiatric/Behavioral: Negative for depression. The patient is not nervous/anxious.    All other systems reviewed and are negative.       Objective:   /60   Pulse 66   Ht 1.93 m (6' 4\")   Wt 111.1 kg (245 lb)   SpO2 95%   BMI 29.82 kg/m²     Physical Exam   Constitutional: He is oriented to person, place, and time. He appears well-developed and well-nourished.   Neck: No JVD present.   Cardiovascular: Normal rate and regular rhythm.  Exam reveals no gallop.    No murmur heard.  Pulmonary/Chest: Effort normal and breath sounds normal. No respiratory distress. He has no wheezes. He has no rales.   Abdominal: Soft. He exhibits no distension. There is no tenderness.   Musculoskeletal: He exhibits no edema.   Neurological: He is alert and oriented to person, place, and time.   Skin: Skin is warm and dry. No erythema.   Psychiatric: He has a normal mood and affect. His behavior is normal.       Assessment:     1. S/P drug eluting coronary stent placement     2. Coronary artery disease involving native coronary artery of native heart without angina pectoris     3. Essential hypertension     4. Dyslipidemia         Medical Decision Making:  Today's Assessment / Status / Plan:     The patient's above cardiovascular conditions are stable.   May gradually increase his activities. Encourage continuing weight loss effort.  Will continue current medications and have the patient return for a followup in 6 months. Will be happy to see the patient sooner as needed.   Thank you for allowing me to participate in the caring of this " patient.

## 2018-11-20 ENCOUNTER — OFFICE VISIT (OUTPATIENT)
Dept: CARDIOLOGY | Facility: MEDICAL CENTER | Age: 53
End: 2018-11-20
Payer: COMMERCIAL

## 2018-11-20 VITALS
DIASTOLIC BLOOD PRESSURE: 60 MMHG | HEART RATE: 64 BPM | SYSTOLIC BLOOD PRESSURE: 112 MMHG | HEIGHT: 76 IN | OXYGEN SATURATION: 97 % | BODY MASS INDEX: 29.59 KG/M2 | WEIGHT: 243 LBS

## 2018-11-20 DIAGNOSIS — Z95.5 S/P DRUG ELUTING CORONARY STENT PLACEMENT: ICD-10-CM

## 2018-11-20 DIAGNOSIS — E78.5 DYSLIPIDEMIA: ICD-10-CM

## 2018-11-20 DIAGNOSIS — I10 ESSENTIAL HYPERTENSION: ICD-10-CM

## 2018-11-20 DIAGNOSIS — I25.10 CORONARY ARTERY DISEASE INVOLVING NATIVE CORONARY ARTERY OF NATIVE HEART WITHOUT ANGINA PECTORIS: ICD-10-CM

## 2018-11-20 PROCEDURE — 99214 OFFICE O/P EST MOD 30 MIN: CPT | Performed by: INTERNAL MEDICINE

## 2018-11-20 ASSESSMENT — ENCOUNTER SYMPTOMS
WEIGHT LOSS: 1
FOCAL WEAKNESS: 0
INSOMNIA: 0
NAUSEA: 0
WEAKNESS: 0
SHORTNESS OF BREATH: 0
DIZZINESS: 0
MYALGIAS: 0
NECK PAIN: 0
PALPITATIONS: 0
VOMITING: 0
NERVOUS/ANXIOUS: 1
BLOOD IN STOOL: 0

## 2018-11-20 NOTE — PROGRESS NOTES
Chief Complaint   Patient presents with   • Coronary Artery Disease     x6mon. f/v    • HTN (Controlled)   Dyslipidemia  S/p PCI of  RCA 2/2018    Subjective:   Benoit Barkley is a 53 y.o. male who presents today for follow-up of above issues.    The patient is doing well from cardiac standpoint.   He denies any chest pain, palpitation or heart failure type symptoms.  Denies any side effect from medications.  Has been able to lose close to 20 pounds.    He has completed his cardiac rehabilitation program.  He would like to resume his mountain biking and do more weight lifting.    Past Medical History:   Diagnosis Date   • Acute coronary syndrome ([facility]) 12/10/2017   • Arthritis    • Asthma    • CAD (coronary artery disease)    • Diabetes ([facility])     oral medication   • Dyslipidemia 12/8/2017   • Hypertension    • NSTEMI (non-ST elevated myocardial infarction) ([facility]) 12/8/2017   • Pain      Past Surgical History:   Procedure Laterality Date   • CARDIAC CATH  12/09/2017    ALICIA to Circ,  RCA with collaterals.   • KNEE ARTHROSCOPY Left 1989   • KNEE ARTHROSCOPY Right 1988   • ANKLE ARTHROSCOPY Right    • ARTHROSCOPY, KNEE Bilateral    • HAND SURGERY Right    • HIP ARTHROPLASTY TOTAL Left    • HIP ARTHROSCOPY Left    • NERVE ULNAR TRANSFER Right    • OTHER      neck   • OTHER Bilateral     2 left shoulder surgeries 3 right shoulder surgeries   • OTHER      lower back   • SHOULDER ARTHROSCOPY Left    • SHOULDER ARTHROSCOPY Right      Family History   Problem Relation Age of Onset   • Heart Disease Mother 50        CABG   • Heart Attack Father    • Heart Disease Father    • Heart Disease Maternal Grandmother 42        heart attack     Social History     Social History   • Marital status:      Spouse name: N/A   • Number of children: N/A   • Years of education: N/A     Occupational History   • Not on file.     Social History Main Topics   • Smoking status: Never Smoker   • Smokeless tobacco: Never Used   •  Alcohol use No   • Drug use: Yes      Comment: CBD oil topical, THC    • Sexual activity: Not on file     Other Topics Concern   • Not on file     Social History Narrative   • No narrative on file     Allergies   Allergen Reactions   • Ibuprofen      Stomach pains...     Outpatient Encounter Prescriptions as of 11/20/2018   Medication Sig Dispense Refill   • Empagliflozin 25 MG Tab Take  by mouth.     • losartan (COZAAR) 25 MG Tab Take 25 mg by mouth every day. 30 Tab 11   • prasugrel (EFFIENT) 10 MG Tab Take 10 mg by mouth every morning.     • Omega-3 Fatty Acids (EQL OMEGA 3 FISH OIL) 1400 MG Cap Take 2 Tabs by mouth 2 Times a Day.     • vitamin D (CHOLECALCIFEROL) 1000 UNIT Tab Take 1,000 Units by mouth 2 Times a Day.     • atorvastatin (LIPITOR) 80 MG tablet Take 1 Tab by mouth every evening. 90 Tab 3   • metoprolol (LOPRESSOR) 25 MG Tab Take 1 Tab by mouth 2 times a day. 180 Tab 3   • metformin (GLUCOPHAGE) 1000 MG tablet Take 1,000 mg by mouth 2 times a day, with meals.     • aspirin EC (ECOTRIN) 81 MG Tablet Delayed Response Take 81 mg by mouth every bedtime.     • multivitamin (THERAGRAN) Tab Take 1 Tab by mouth every morning.     • nitroglycerin (NITROSTAT) 0.4 MG SL Tab Place 1 Tab under tongue as needed for Chest Pain. 25 Tab 2   • acetaminophen (TYLENOL) 325 MG Tab Take 650 mg by mouth every four hours as needed.     • acetaminophen/caffeine/butalbital 325-40-50 mg (FIORICET) -40 MG Tab Take 1 Tab by mouth every 6 hours as needed for Headache or Migraine. 30 Tab 1   • albuterol 108 (90 Base) MCG/ACT Aero Soln inhalation aerosol Inhale 2 Puffs by mouth every four hours as needed for Shortness of Breath.       No facility-administered encounter medications on file as of 11/20/2018.      Review of Systems   Constitutional: Positive for weight loss. Negative for malaise/fatigue.   HENT: Negative for congestion and nosebleeds.    Respiratory: Negative for shortness of breath.    Cardiovascular: Negative  "for chest pain, palpitations and leg swelling.   Gastrointestinal: Negative for blood in stool, nausea and vomiting.   Musculoskeletal: Negative for myalgias and neck pain.   Neurological: Negative for dizziness, focal weakness and weakness.   Psychiatric/Behavioral: The patient is nervous/anxious. The patient does not have insomnia.         Objective:   /60 (BP Location: Left arm, Patient Position: Sitting, BP Cuff Size: Adult)   Pulse 64   Ht 1.93 m (6' 4\")   Wt 110.2 kg (243 lb)   SpO2 97%   BMI 29.58 kg/m²     Physical Exam   Constitutional: He is oriented to person, place, and time. He appears well-developed. No distress.   HENT:   Head: Normocephalic and atraumatic.   Eyes: Pupils are equal, round, and reactive to light. EOM are normal.   Neck: No JVD present. No thyromegaly present.   Cardiovascular: Normal rate and regular rhythm.  Exam reveals no gallop.    No murmur heard.  Pulmonary/Chest: Effort normal. No respiratory distress. He has no wheezes. He has no rales.   Abdominal: Soft. He exhibits no distension. There is no tenderness.   Musculoskeletal: He exhibits no edema or deformity.   Neurological: He is alert and oriented to person, place, and time.   Skin: Skin is warm. No erythema.   Psychiatric: He has a normal mood and affect. His behavior is normal.     [facility]; CMP was normal except glucose 134.  Lipid profile showed total cholesterol 120 LDL 54 HDL 31    Assessment:     1. Coronary artery disease involving native coronary artery of native heart without angina pectoris     2. S/P drug eluting coronary stent placement     3. Essential hypertension     4. Dyslipidemia         Medical Decision Making:  Today's Assessment / Status / Plan:     The patient's above cardiovascular conditions are stable. Will continue current medications but may discontinue praziquantel after February.  He was encouraged to continue weight reduction with the goal of body weight " around 230 pounds.  He was advised that he may gradually increase his exercise capacity over the next 6-12 weeks. Will have the patient return for a followup in 1 year.   Will be happy to see the patient sooner as needed. Thank you for allowing me to participate in the caring of this patient.

## 2018-11-20 NOTE — LETTER
[facility]   [address], [address]  [place] [address]  Phone: [phone]  Fax: [phone]              Benoit Barkley  1965    Encounter Date: 11/20/2018    Shabnam Roberts M.D.          PROGRESS NOTE:  Chief Complaint   Patient presents with   • Coronary Artery Disease     x6mon. f/v    • HTN (Controlled)   Dyslipidemia  S/p PCI of  RCA 2/2018    Subjective:   Benoit Barkley is a 53 y.o. male who presents today for follow-up of above issues.    The patient is doing well from cardiac standpoint.   He denies any chest pain, palpitation or heart failure type symptoms.  Denies any side effect from medications.  Has been able to lose close to 20 pounds.    He has completed his cardiac rehabilitation program.  He would like to resume his mountain biking and do more weight lifting.    Past Medical History:   Diagnosis Date   • Acute coronary syndrome ([facility]) 12/10/2017   • Arthritis    • Asthma    • CAD (coronary artery disease)    • Diabetes ([facility])     oral medication   • Dyslipidemia 12/8/2017   • Hypertension    • NSTEMI (non-ST elevated myocardial infarction) ([facility]) 12/8/2017   • Pain      Past Surgical History:   Procedure Laterality Date   • CARDIAC CATH  12/09/2017    ALICIA to Circ,  RCA with collaterals.   • KNEE ARTHROSCOPY Left 1989   • KNEE ARTHROSCOPY Right 1988   • ANKLE ARTHROSCOPY Right    • ARTHROSCOPY, KNEE Bilateral    • HAND SURGERY Right    • HIP ARTHROPLASTY TOTAL Left    • HIP ARTHROSCOPY Left    • NERVE ULNAR TRANSFER Right    • OTHER      neck   • OTHER Bilateral     2 left shoulder surgeries 3 right shoulder surgeries   • OTHER      lower back   • SHOULDER ARTHROSCOPY Left    • SHOULDER ARTHROSCOPY Right      Family History   Problem Relation Age of Onset   • Heart Disease Mother 50        CABG   • Heart Attack Father    • Heart Disease Father    • Heart Disease Maternal Grandmother 42        heart attack     Social History        Social History   • Marital status:      Spouse name: N/A   • Number of children: N/A   • Years of education: N/A     Occupational History   • Not on file.     Social History Main Topics   • Smoking status: Never Smoker   • Smokeless tobacco: Never Used   • Alcohol use No   • Drug use: Yes      Comment: CBD oil topical, THC    • Sexual activity: Not on file     Other Topics Concern   • Not on file     Social History Narrative   • No narrative on file     Allergies   Allergen Reactions   • Ibuprofen      Stomach pains...     Outpatient Encounter Prescriptions as of 11/20/2018   Medication Sig Dispense Refill   • Empagliflozin 25 MG Tab Take  by mouth.     • losartan (COZAAR) 25 MG Tab Take 25 mg by mouth every day. 30 Tab 11   • prasugrel (EFFIENT) 10 MG Tab Take 10 mg by mouth every morning.     • Omega-3 Fatty Acids (EQL OMEGA 3 FISH OIL) 1400 MG Cap Take 2 Tabs by mouth 2 Times a Day.     • vitamin D (CHOLECALCIFEROL) 1000 UNIT Tab Take 1,000 Units by mouth 2 Times a Day.     • atorvastatin (LIPITOR) 80 MG tablet Take 1 Tab by mouth every evening. 90 Tab 3   • metoprolol (LOPRESSOR) 25 MG Tab Take 1 Tab by mouth 2 times a day. 180 Tab 3   • metformin (GLUCOPHAGE) 1000 MG tablet Take 1,000 mg by mouth 2 times a day, with meals.     • aspirin EC (ECOTRIN) 81 MG Tablet Delayed Response Take 81 mg by mouth every bedtime.     • multivitamin (THERAGRAN) Tab Take 1 Tab by mouth every morning.     • nitroglycerin (NITROSTAT) 0.4 MG SL Tab Place 1 Tab under tongue as needed for Chest Pain. 25 Tab 2   • acetaminophen (TYLENOL) 325 MG Tab Take 650 mg by mouth every four hours as needed.     • acetaminophen/caffeine/butalbital 325-40-50 mg (FIORICET) -40 MG Tab Take 1 Tab by mouth every 6 hours as needed for Headache or Migraine. 30 Tab 1   • albuterol 108 (90 Base) MCG/ACT Aero Soln inhalation aerosol Inhale 2 Puffs by mouth every four hours as needed for Shortness of Breath.       No facility-administered  "encounter medications on file as of 11/20/2018.      Review of Systems   Constitutional: Positive for weight loss. Negative for malaise/fatigue.   HENT: Negative for congestion and nosebleeds.    Respiratory: Negative for shortness of breath.    Cardiovascular: Negative for chest pain, palpitations and leg swelling.   Gastrointestinal: Negative for blood in stool, nausea and vomiting.   Musculoskeletal: Negative for myalgias and neck pain.   Neurological: Negative for dizziness, focal weakness and weakness.   Psychiatric/Behavioral: The patient is nervous/anxious. The patient does not have insomnia.         Objective:   /60 (BP Location: Left arm, Patient Position: Sitting, BP Cuff Size: Adult)   Pulse 64   Ht 1.93 m (6' 4\")   Wt 110.2 kg (243 lb)   SpO2 97%   BMI 29.58 kg/m²      Physical Exam   Constitutional: He is oriented to person, place, and time. He appears well-developed. No distress.   HENT:   Head: Normocephalic and atraumatic.   Eyes: Pupils are equal, round, and reactive to light. EOM are normal.   Neck: No JVD present. No thyromegaly present.   Cardiovascular: Normal rate and regular rhythm.  Exam reveals no gallop.    No murmur heard.  Pulmonary/Chest: Effort normal. No respiratory distress. He has no wheezes. He has no rales.   Abdominal: Soft. He exhibits no distension. There is no tenderness.   Musculoskeletal: He exhibits no edema or deformity.   Neurological: He is alert and oriented to person, place, and time.   Skin: Skin is warm. No erythema.   Psychiatric: He has a normal mood and affect. His behavior is normal.     [facility]; CMP was normal except glucose 134.  Lipid profile showed total cholesterol 120 LDL 54 HDL 31    Assessment:     1. Coronary artery disease involving native coronary artery of native heart without angina pectoris     2. S/P drug eluting coronary stent placement     3. Essential hypertension     4. Dyslipidemia         Medical Decision " Making:  Today's Assessment / Status / Plan:     The patient's above cardiovascular conditions are stable. Will continue current medications but may discontinue praziquantel after February.  He was encouraged to continue weight reduction with the goal of body weight around 230 pounds.  He was advised that he may gradually increase his exercise capacity over the next 6-12 weeks. Will have the patient return for a followup in 1 year.   Will be happy to see the patient sooner as needed. Thank you for allowing me to participate in the caring of this patient.      No Recipients

## 2019-12-02 NOTE — NON-PROVIDER
Benoit Barkley attended the following workshop from 9:00-10:00am.  Workshop Title: Dining Out.      Lecture was attended and patient questions addressed. The patient will continue workshops and nutrition education.               no

## 2020-02-05 ENCOUNTER — OFFICE VISIT (OUTPATIENT)
Dept: CARDIOLOGY | Facility: MEDICAL CENTER | Age: 55
End: 2020-02-05
Payer: MEDICARE

## 2020-02-05 VITALS
OXYGEN SATURATION: 97 % | SYSTOLIC BLOOD PRESSURE: 130 MMHG | WEIGHT: 247 LBS | DIASTOLIC BLOOD PRESSURE: 72 MMHG | BODY MASS INDEX: 30.08 KG/M2 | HEART RATE: 62 BPM | HEIGHT: 76 IN

## 2020-02-05 DIAGNOSIS — E78.5 DYSLIPIDEMIA: ICD-10-CM

## 2020-02-05 DIAGNOSIS — I25.10 CORONARY ARTERY DISEASE INVOLVING NATIVE CORONARY ARTERY OF NATIVE HEART WITHOUT ANGINA PECTORIS: ICD-10-CM

## 2020-02-05 DIAGNOSIS — Z95.5 S/P DRUG ELUTING CORONARY STENT PLACEMENT: ICD-10-CM

## 2020-02-05 DIAGNOSIS — I10 ESSENTIAL HYPERTENSION: ICD-10-CM

## 2020-02-05 PROCEDURE — 99213 OFFICE O/P EST LOW 20 MIN: CPT | Performed by: INTERNAL MEDICINE

## 2020-02-05 ASSESSMENT — ENCOUNTER SYMPTOMS
GASTROINTESTINAL NEGATIVE: 1
NEUROLOGICAL NEGATIVE: 1
MUSCULOSKELETAL NEGATIVE: 1
PSYCHIATRIC NEGATIVE: 1
WEIGHT LOSS: 0
CARDIOVASCULAR NEGATIVE: 1

## 2020-02-05 NOTE — PROGRESS NOTES
Chief Complaint   Patient presents with   • Coronary Artery Disease     follow up   Old MI, s/p PCI RCA 2/2018  Dyslipidemia    Subjective:   Benoit Barkley is a 54 y.o. male who presents today for f/u above issues    Denies cardiac issues  No limitations  Having issue handling stress    Past Medical History:   Diagnosis Date   • Acute coronary syndrome ([facility]) 12/10/2017   • Arthritis    • Asthma    • CAD (coronary artery disease)    • Diabetes ([facility])     oral medication   • Dyslipidemia 12/8/2017   • Hypertension    • NSTEMI (non-ST elevated myocardial infarction) ([facility]) 12/8/2017   • Pain      Past Surgical History:   Procedure Laterality Date   • ZZZ CARDIAC CATH  12/09/2017    ALICIA to Circ,  RCA with collaterals.   • KNEE ARTHROSCOPY Left 1989   • KNEE ARTHROSCOPY Right 1988   • ANKLE ARTHROSCOPY Right    • ARTHROSCOPY, KNEE Bilateral    • HAND SURGERY Right    • HIP ARTHROPLASTY TOTAL Left    • HIP ARTHROSCOPY Left    • NERVE ULNAR TRANSFER Right    • OTHER      neck   • OTHER Bilateral     2 left shoulder surgeries 3 right shoulder surgeries   • OTHER      lower back   • SHOULDER ARTHROSCOPY Left    • SHOULDER ARTHROSCOPY Right      Family History   Problem Relation Age of Onset   • Heart Disease Mother 50        CABG   • Heart Attack Father    • Heart Disease Father    • Heart Disease Maternal Grandmother 42        heart attack     Social History     Socioeconomic History   • Marital status:      Spouse name: Not on file   • Number of children: Not on file   • Years of education: Not on file   • Highest education level: Not on file   Occupational History   • Not on file   Social Needs   • Financial resource strain: Not on file   • Food insecurity:     Worry: Not on file     Inability: Not on file   • Transportation needs:     Medical: Not on file     Non-medical: Not on file   Tobacco Use   • Smoking status: Never Smoker   • Smokeless tobacco: Never Used   Substance and Sexual Activity   • Alcohol  use: No   • Drug use: Yes     Comment: CBD oil topical, THC    • Sexual activity: Not on file   Lifestyle   • Physical activity:     Days per week: Not on file     Minutes per session: Not on file   • Stress: Not on file   Relationships   • Social connections:     Talks on phone: Not on file     Gets together: Not on file     Attends Protestant service: Not on file     Active member of club or organization: Not on file     Attends meetings of clubs or organizations: Not on file     Relationship status: Not on file   • Intimate partner violence:     Fear of current or ex partner: Not on file     Emotionally abused: Not on file     Physically abused: Not on file     Forced sexual activity: Not on file   Other Topics Concern   • Not on file   Social History Narrative   • Not on file     Allergies   Allergen Reactions   • Ibuprofen      Stomach pains...     Outpatient Encounter Medications as of 2/5/2020   Medication Sig Dispense Refill   • Empagliflozin 25 MG Tab Take  by mouth.     • losartan (COZAAR) 25 MG Tab Take 25 mg by mouth every day. 30 Tab 11   • nitroglycerin (NITROSTAT) 0.4 MG SL Tab Place 1 Tab under tongue as needed for Chest Pain. 25 Tab 2   • Omega-3 Fatty Acids (EQL OMEGA 3 FISH OIL) 1400 MG Cap Take 2 Tabs by mouth 2 Times a Day.     • vitamin D (CHOLECALCIFEROL) 1000 UNIT Tab Take 1,000 Units by mouth 2 Times a Day.     • atorvastatin (LIPITOR) 80 MG tablet Take 1 Tab by mouth every evening. 90 Tab 3   • metoprolol (LOPRESSOR) 25 MG Tab Take 1 Tab by mouth 2 times a day. 180 Tab 3   • albuterol 108 (90 Base) MCG/ACT Aero Soln inhalation aerosol Inhale 2 Puffs by mouth every four hours as needed for Shortness of Breath.     • metformin (GLUCOPHAGE) 1000 MG tablet Take 1,000 mg by mouth 2 times a day, with meals.     • aspirin EC (ECOTRIN) 81 MG Tablet Delayed Response Take 81 mg by mouth every bedtime.     • multivitamin (THERAGRAN) Tab Take 1 Tab by mouth every morning.     • prasugrel (EFFIENT) 10 MG  "Tab Take 10 mg by mouth every morning.     • acetaminophen (TYLENOL) 325 MG Tab Take 650 mg by mouth every four hours as needed.     • acetaminophen/caffeine/butalbital 325-40-50 mg (FIORICET) -40 MG Tab Take 1 Tab by mouth every 6 hours as needed for Headache or Migraine. (Patient not taking: Reported on 2/5/2020) 30 Tab 1     No facility-administered encounter medications on file as of 2/5/2020.      Review of Systems   Constitutional: Negative for malaise/fatigue and weight loss.   HENT: Negative.    Cardiovascular: Negative.    Gastrointestinal: Negative.    Genitourinary: Negative.    Musculoskeletal: Negative.    Skin: Negative.    Neurological: Negative.    Psychiatric/Behavioral: Negative.         Objective:   /72 (BP Location: Left arm, Patient Position: Sitting)   Pulse 62   Ht 1.93 m (6' 4\")   Wt 112 kg (247 lb)   SpO2 97%   BMI 30.07 kg/m²     Physical Exam   Constitutional: He is oriented to person, place, and time. He appears well-nourished.   Neck: No JVD present.   Cardiovascular: Normal rate and regular rhythm. Exam reveals no gallop.   No murmur heard.  Pulmonary/Chest: Effort normal and breath sounds normal. No respiratory distress. He has no wheezes. He has no rales.   Abdominal: Soft. He exhibits no distension. There is no tenderness.   Musculoskeletal:         General: No edema.   Neurological: He is alert and oriented to person, place, and time.   Skin: Skin is warm and dry. No erythema.   Psychiatric: He has a normal mood and affect. His behavior is normal.       Assessment:     1. S/P drug eluting coronary stent placement     2. Coronary artery disease involving native coronary artery of native heart without angina pectoris     3. Essential hypertension     4. Dyslipidemia         Medical Decision Making:  Today's Assessment / Status / Plan:     The patient's above cardiovascular conditions are stable.   Will continue current medications and have the patient return for a " followup in 1 year.   Will be happy to see the patient sooner as needed.   Thank you for allowing me to participate in the caring of this patient.

## 2021-07-27 ENCOUNTER — NON-PROVIDER VISIT (OUTPATIENT)
Dept: NEUROLOGY | Facility: MEDICAL CENTER | Age: 56
End: 2021-07-27
Attending: FAMILY MEDICINE
Payer: COMMERCIAL

## 2021-07-27 DIAGNOSIS — R20.0 NUMBNESS: ICD-10-CM

## 2021-07-27 PROCEDURE — 95886 MUSC TEST DONE W/N TEST COMP: CPT | Performed by: SPECIALIST

## 2021-07-27 PROCEDURE — 95886 MUSC TEST DONE W/N TEST COMP: CPT | Mod: 26 | Performed by: SPECIALIST

## 2021-07-27 PROCEDURE — 95910 NRV CNDJ TEST 7-8 STUDIES: CPT | Performed by: SPECIALIST

## 2021-07-27 PROCEDURE — 95910 NRV CNDJ TEST 7-8 STUDIES: CPT | Mod: 26 | Performed by: SPECIALIST

## 2021-07-27 NOTE — PROCEDURES
NERVE CONDUCTION STUDIES AND ELECTROMYOGRAPHY REPORT        07/27/21      Referring provider: Ravi Kim D.O.      SUMMARY OF PATIENT'S CLINICAL HISTORY,PHYSICAL EXAM, AND RATIONALE FOR TESTING:    Mr. Benoit Barkley 56 y.o. presenting with bilateral upper extremity numbness in a patient who is undergone a C3-C7 fusion and right carpal tunnel release and ulnar nerve transposition.  He reports bilateral right greater than left triceps muscle atrophy.  Past Medical History is significant for :   Past Medical History:   Diagnosis Date   • Acute coronary syndrome ([facility]) 12/10/2017   • Arthritis    • Asthma    • CAD (coronary artery disease)    • Diabetes ([facility])     oral medication   • Dyslipidemia 12/8/2017   • Hypertension    • NSTEMI (non-ST elevated myocardial infarction) ([facility]) 12/8/2017   • Pain        A brief general examination was remarkable/unremarkable for atrophy of the proximal upper extremities bilaterally with more prominent left triceps atrophy.    The electrodiagnostic studies were performed to evaluate for possible radiculopathy versus peripheral neuropathy.      ELECTRODIAGNOSTIC EXAMINATION:  Nerve conduction studies (NCS) and electromyography (EMG) are utilized to evaluate direct or indirect damage to the peripheral nervous system. NCS are performed to measure the nerve(s) response(s) to electrostimulation across a given nerve segment. EMG evaluates the passive and active electrical activity of the muscle(s) in question.  Muscles are innervated by specific peripheral nerves and roots. Often times, several nerves the muscle to be examined in order to determine the presence or absence of the disease process. Furthermore, nerves and muscles may need to be tested in a [id number] comparison, as well as in additional extremities, as this may be crucial in characterizing the extent of the disease process, which may be diffuse or isolated and of varying degree of severity. The extent of the  neurodiagnostic exam is justified as it may help arrive to a proper diagnosis, which ultimately may contribute to better management of the patient. Therefore, the nerves to muscles examined during the study were medically necessary.    Unless otherwise noted, temperature of the extremity(s) study was monitored before and during the examination and remained between 32 and 36 degrees C for the upper extremities, and between 30 and 36 degrees C for the lower extremities.      NERVE CONDUCTION STUDIES:  Sensory nerves:   - Bilateral Median sensory nerves were examined.The responses were abnormal bilaterally.  The response on the right was not elicited with antidromic stimuli.  The response on the left exhibited a normal onset latency but decreased amplitude of 6.3 µV.  - Bilateral Ulnar sensory nerves were examined. The responses were abnormal bilaterally.  Onset latency was normal bilaterally but amplitude was decreased to 4.6 µV on the left and 8.0 µV on the right.    Motor nerves:   - Bilateral Median motor nerves were examined. Recording electrodes placed at the Abductor Pollicis Brevis muscles. The responses were within normal limits.  - Bilateral Ulnar motor nerves were examined. Recording electrodes placed at the Abductor Digiti Minimi muscles. The response on the right was within normal limits.  The response on the left was abnormal.  Onset latency was prolonged at 3.6 mV, amplitude was normal and conduction velocity was mildly slow at the elbow.        ELECTROMYOGRAPHY:  The study was performed the concentric needle electrode. Fibrillation and fasciculation activity is graded by convention from none (0) to continuous (4+).  Needle electrode examination was performed in the following muscles: Bilateral deltoid, biceps, triceps, first dorsal interosseous, abductor pollicis brevis.  No acute denervation changes were noted, there was no increased insertional activity fibrillation potentials or positive sharp waves.   Chronic neuropathic changes consisting of decreased recruitment of increased amplitude potentials were noted bilaterally in the biceps and triceps muscles.  These findings were most prominent in the left triceps muscle where essentially a single motor unit firing pattern was noted.  The bilateral deltoid, first dorsal interosseous, and abductor pollicis brevis muscles were normal electrophysiologically.      Nerve Conduction Studies     Stim Site NR Onset (ms) Norm Onset (ms) O-P Amp (µV) Norm O-P Amp Site1 Site2 Delta-P (ms) Dist (cm) Tino (m/s) Norm Tino (m/s)   Left Median Anti Sensory (2nd Digit)   Wrist    2.8 <3.6 *6.3 >10 Wrist 2nd Digit 3.8 14.0 *37 >50   Right Median Anti Sensory (2nd Digit)   Wrist *NR  <3.6  >10 Wrist 2nd Digit  14.0  >50   Left Ulnar Anti Sensory (5th Digit)   Wrist    3.0 <3.1 *4.6 >10 Wrist 5th Digit 3.7 14.0 *38 >50   Right Ulnar Anti Sensory (5th Digit)   Wrist    1.8 <3.1 *8.0 >10 Wrist 5th Digit 3.5 14.0 *40 >50        Stim Site NR Onset (ms) Norm Onset (ms) O-P Amp (mV) Norm O-P Amp Site1 Site2 Delta-0 (ms) Dist (cm) Tino (m/s) Norm Tino (m/s)   Left Median Motor (Abd Poll Brev)   Wrist    3.9 <4 7.4 >6 Elbow Wrist 5.4 29.0 54 >50   Elbow    9.3  7.2          Right Median Motor (Abd Poll Brev)   Wrist    3.8 <4 9.3 >6 Elbow Wrist 5.4 28.0 52 >50   Elbow    9.2  8.4          Left Ulnar Motor (Abd Dig Min)   Wrist    *3.6 <3.1 9.0 >7 B Elbow Wrist 3.4 20.0 59 >50   B Elbow    7.0  4.3  A Elbow B Elbow 2.5 10.0 40    A Elbow    9.5  7.4          Right Ulnar Motor (Abd Dig Min)   Wrist    3.0 <3.1 7.2 >7 B Elbow Wrist 4.9 22.0 *45 >50   B Elbow    7.9  5.9  A Elbow B Elbow 1.9 10.0 53    A Elbow    9.8  3.8                                Electromyography     Side Muscle Nerve Root Ins Act Fibs Psw Amp Dur Poly Recrt Int Pat Comment   Right Deltoid Axillary C5-6 Nml Nml Nml Nml Nml 0 Nml Nml    Right Biceps Musculocut C5-6 Nml Nml Nml *Incr Nml 0 *Reduced *75%    Right Triceps Radial  C6-7-8 Nml Nml Nml *Incr Nml 0 *Reduced *75%    Right 1stDorInt Ulnar C8-T1 Nml Nml Nml Nml Nml 0 Nml Nml    Right Abd Poll Brev Median C8-T1 Nml Nml Nml Nml Nml 0 Nml Nml    Left Deltoid Axillary C5-6 Nml Nml Nml Nml Nml 0 Nml Nml    Left Biceps Musculocut C5-6 Nml Nml Nml *Incr Nml 0 *Reduced *75%    Left Triceps Radial C6-7-8 Nml Nml Nml *Incr Nml 0 *Reduced *25%    Left 1stDorInt Ulnar C8-T1 Nml Nml Nml Nml Nml 0 Nml Nml    Left Abd Poll Brev Median C8-T1 Nml Nml Nml Nml Nml 0 Nml Nml          DIAGNOSTIC INTERPRETATION:   Extensive electrodiagnostic studies were performed to the bilateral upper extremities.  The results are as follows:    1.  Absent right median sensory response in a patient who was undergone a carpal tunnel release on that side.  The motor response was normal.  This could represent residual findings from the carpal tunnel or could represent ongoing active median nerve compression.  Clinical correlation is suggested.    2.  Left ulnar neuropathy with both slowing at the wrist and the elbow.  There were no motor unit changes in ulnar nerve supplied hand muscles.    3.  Chronic neuropathic changes noted in both the left and right biceps and triceps muscles without acute denervation changes noted.  This is suggestive of chronic radiculopathy at the C6 and C7 levels bilaterally.  This is most pronounced in the left triceps muscle which is prominently atrophic on examination.    4.  Absent right median sensory response with low amplitude left median and bilateral ulnar sensory responses.  This could indicate an underlying sensory neuropathy.  Clinical correlation is suggested.          VIPIN Mendez M.D.

## 2021-08-12 ENCOUNTER — OFFICE VISIT (OUTPATIENT)
Dept: CARDIOLOGY | Facility: MEDICAL CENTER | Age: 56
End: 2021-08-12
Payer: MEDICARE

## 2021-08-12 VITALS
DIASTOLIC BLOOD PRESSURE: 72 MMHG | WEIGHT: 250 LBS | RESPIRATION RATE: 14 BRPM | SYSTOLIC BLOOD PRESSURE: 128 MMHG | HEIGHT: 76 IN | OXYGEN SATURATION: 95 % | HEART RATE: 69 BPM | BODY MASS INDEX: 30.44 KG/M2

## 2021-08-12 DIAGNOSIS — Z95.5 S/P DRUG ELUTING CORONARY STENT PLACEMENT: ICD-10-CM

## 2021-08-12 DIAGNOSIS — Z79.899 HIGH RISK MEDICATION USE: ICD-10-CM

## 2021-08-12 DIAGNOSIS — E11.65 TYPE 2 DIABETES MELLITUS WITH HYPERGLYCEMIA, WITHOUT LONG-TERM CURRENT USE OF INSULIN (HCC): ICD-10-CM

## 2021-08-12 DIAGNOSIS — E78.5 DYSLIPIDEMIA: ICD-10-CM

## 2021-08-12 DIAGNOSIS — I10 ESSENTIAL HYPERTENSION: ICD-10-CM

## 2021-08-12 DIAGNOSIS — I25.10 CORONARY ARTERY DISEASE INVOLVING NATIVE CORONARY ARTERY OF NATIVE HEART WITHOUT ANGINA PECTORIS: ICD-10-CM

## 2021-08-12 PROCEDURE — 99214 OFFICE O/P EST MOD 30 MIN: CPT | Performed by: INTERNAL MEDICINE

## 2021-08-12 RX ORDER — VIT C/B6/B5/MAGNESIUM/HERB 173 50-5-6-5MG
CAPSULE ORAL DAILY
COMMUNITY

## 2021-08-12 ASSESSMENT — ENCOUNTER SYMPTOMS
NAUSEA: 0
VOMITING: 0
FALLS: 0
SPEECH CHANGE: 0
PND: 0
HALLUCINATIONS: 0
DEPRESSION: 0
MYALGIAS: 0
CLAUDICATION: 0
WEIGHT LOSS: 0
EYE DISCHARGE: 0
PALPITATIONS: 0
HEADACHES: 0
ABDOMINAL PAIN: 0
COUGH: 0
DOUBLE VISION: 0
DIZZINESS: 0
BLOOD IN STOOL: 0
ORTHOPNEA: 0
BLURRED VISION: 0
CHILLS: 0
FEVER: 0
EYE PAIN: 0
SENSORY CHANGE: 0
SHORTNESS OF BREATH: 0
LOSS OF CONSCIOUSNESS: 0
BRUISES/BLEEDS EASILY: 0

## 2021-08-12 NOTE — PROGRESS NOTES
Chief Complaint   Patient presents with   • Coronary Artery Disease     F/V Dx: Coronary artery disease involving native coronary artery of native heart without angina pectoris       Subjective:   Benoit Barkley is a 56 y.o. male who presents today for cardiac care and management due to established coronary status post RCA in February 2018, hypertension, hyperlipidemia.    Most recent transthoracic echocardiogram was done December 2017 which showed normal LV function and no significant valvular disease.  I personally interpreted the images of the study myself.    In the interim, patient has been doing well without having any symptoms. Patient denies having chest pain, dyspnea, palpitation, presyncope, syncope episodes.     Past Medical History:   Diagnosis Date   • Acute coronary syndrome ([facility]) 12/10/2017   • Arthritis    • Asthma    • CAD (coronary artery disease)    • Diabetes ([facility])     oral medication   • Dyslipidemia 12/8/2017   • Hypertension    • NSTEMI (non-ST elevated myocardial infarction) ([facility]) 12/8/2017   • Pain      Past Surgical History:   Procedure Laterality Date   • Z CARDIAC CATH  12/09/2017    ALICIA to Circ,  RCA with collaterals.   • KNEE ARTHROSCOPY Left 1989   • KNEE ARTHROSCOPY Right 1988   • ANKLE ARTHROSCOPY Right    • ARTHROSCOPY, KNEE Bilateral    • HAND SURGERY Right    • HIP ARTHROPLASTY TOTAL Left    • HIP ARTHROSCOPY Left    • NERVE ULNAR TRANSFER Right    • OTHER      neck   • OTHER Bilateral     2 left shoulder surgeries 3 right shoulder surgeries   • OTHER      lower back   • SHOULDER ARTHROSCOPY Left    • SHOULDER ARTHROSCOPY Right      Family History   Problem Relation Age of Onset   • Heart Disease Mother 50        CABG   • Heart Attack Father    • Heart Disease Father    • Heart Disease Maternal Grandmother 42        heart attack     Social History     Socioeconomic History   • Marital status:      Spouse name: Not on file   • Number of children: Not on file   •  Years of education: Not on file   • Highest education level: Not on file   Occupational History   • Not on file   Tobacco Use   • Smoking status: Never Smoker   • Smokeless tobacco: Never Used   Vaping Use   • Vaping Use: Never assessed   Substance and Sexual Activity   • Alcohol use: Yes     Alcohol/week: 1.2 oz     Types: 2 Cans of beer per week     Comment: 6 PACK IN A MONTH   • Drug use: Yes     Comment: CBD oil topical, THC    • Sexual activity: Not on file   Other Topics Concern   • Not on file   Social History Narrative   • Not on file     Social Determinants of Health     Financial Resource Strain:    • Difficulty of Paying Living Expenses:    Food Insecurity:    • Worried About Running Out of Food in the Last Year:    • Ran Out of Food in the Last Year:    Transportation Needs:    • Lack of Transportation (Medical):    • Lack of Transportation (Non-Medical):    Physical Activity:    • Days of Exercise per Week:    • Minutes of Exercise per Session:    Stress:    • Feeling of Stress :    Social Connections:    • Frequency of Communication with Friends and Family:    • Frequency of Social Gatherings with Friends and Family:    • Attends [employer] Services:    • Active Member of Clubs or Organizations:    • Attends Club or Organization Meetings:    • Marital Status:    Intimate Partner Violence:    • Fear of Current or Ex-Partner:    • Emotionally Abused:    • Physically Abused:    • Sexually Abused:      Allergies   Allergen Reactions   • Ibuprofen      Stomach pains...     Outpatient Encounter Medications as of 8/12/2021   Medication Sig Dispense Refill   • Turmeric 500 MG Cap Take  by mouth every day.     • Empagliflozin 25 MG Tab Take  by mouth.     • losartan (COZAAR) 25 MG Tab Take 25 mg by mouth every day. 30 Tab 11   • nitroglycerin (NITROSTAT) 0.4 MG SL Tab Place 1 Tab under tongue as needed for Chest Pain. 25 Tab 2   • Omega-3 Fatty Acids (EQL OMEGA 3 FISH OIL) 1400 MG Cap Take 2 Tabs by mouth 2  Times a Day.     • vitamin D (CHOLECALCIFEROL) 1000 UNIT Tab Take 1,000 Units by mouth 2 Times a Day.     • acetaminophen (TYLENOL) 325 MG Tab Take 650 mg by mouth every four hours as needed.     • atorvastatin (LIPITOR) 80 MG tablet Take 1 Tab by mouth every evening. 90 Tab 3   • metoprolol (LOPRESSOR) 25 MG Tab Take 1 Tab by mouth 2 times a day. 180 Tab 3   • albuterol 108 (90 Base) MCG/ACT Aero Soln inhalation aerosol Inhale 2 Puffs by mouth every four hours as needed for Shortness of Breath.     • metformin (GLUCOPHAGE) 1000 MG tablet Take 1,000 mg by mouth 2 times a day, with meals.     • aspirin EC (ECOTRIN) 81 MG Tablet Delayed Response Take 81 mg by mouth every bedtime.     • multivitamin (THERAGRAN) Tab Take 1 Tab by mouth every morning.     • [DISCONTINUED] prasugrel (EFFIENT) 10 MG Tab Take 10 mg by mouth every morning. (Patient not taking: Reported on 8/12/2021)     • [DISCONTINUED] acetaminophen/caffeine/butalbital 325-40-50 mg (FIORICET) -40 MG Tab Take 1 Tab by mouth every 6 hours as needed for Headache or Migraine. (Patient not taking: Reported on 2/5/2020) 30 Tab 1     No facility-administered encounter medications on file as of 8/12/2021.     Review of Systems   Constitutional: Negative for chills, fever, malaise/fatigue and weight loss.   HENT: Negative for ear discharge, ear pain, hearing loss and nosebleeds.    Eyes: Negative for blurred vision, double vision, pain and discharge.   Respiratory: Negative for cough and shortness of breath.    Cardiovascular: Negative for chest pain, palpitations, orthopnea, claudication, leg swelling and PND.   Gastrointestinal: Negative for abdominal pain, blood in stool, melena, nausea and vomiting.   Genitourinary: Negative for dysuria and hematuria.   Musculoskeletal: Negative for falls, joint pain and myalgias.   Skin: Negative for itching and rash.   Neurological: Negative for dizziness, sensory change, speech change, loss of consciousness and  "headaches.   Endo/Heme/Allergies: Negative for environmental allergies. Does not bruise/bleed easily.   Psychiatric/Behavioral: Negative for depression, hallucinations and suicidal ideas.        Objective:   /72 (BP Location: Left arm, Patient Position: Sitting, BP Cuff Size: Adult)   Pulse 69   Resp 14   Ht 1.93 m (6' 4\")   Wt 113 kg (250 lb)   SpO2 95%   BMI 30.43 kg/m²     Physical Exam   Constitutional: He is oriented to person, place, and time. No distress.   HENT:   Head: Normocephalic and atraumatic.   Right Ear: External ear normal.   Left Ear: External ear normal.   Eyes: Right eye exhibits no discharge. Left eye exhibits no discharge.   Neck: No JVD present. No thyromegaly present.   Cardiovascular: Normal rate and regular rhythm.   Pulmonary/Chest: Breath sounds normal. No respiratory distress.   Abdominal: Bowel sounds are normal. He exhibits no distension. There is no abdominal tenderness.   Musculoskeletal:         General: No tenderness.   Neurological: He is alert and oriented to person, place, and time. No cranial nerve deficit.   Skin: Skin is warm and dry. He is not diaphoretic.   Psychiatric: His behavior is normal.   Nursing note and vitals reviewed.      Assessment:     1. Coronary artery disease involving native coronary artery of native heart without angina pectoris  NM-CARDIAC STRESS TEST   2. S/P drug eluting coronary stent placement  NM-CARDIAC STRESS TEST   3. Dyslipidemia  NM-CARDIAC STRESS TEST   4. Essential hypertension  NM-CARDIAC STRESS TEST   5. High risk medication use  NM-CARDIAC STRESS TEST   6. Type 2 diabetes mellitus with hyperglycemia, without long-term current use of insulin (HCC)  NM-CARDIAC STRESS TEST       Medical Decision Making:  Today's Assessment / Status / Plan:   Coronary arterial disease s/p PCI of  RCA in 02/2018 (due to abnormal stress test) and stent in mid Lx in 12/2017:  Betablocker as Metoprolol 25 mg po 2x daily.  ASA 81 mg po daily  Statin " as Atorvastatin 80 mg po daily  ARB as Losartan 25 mg po daily.    Based on Medicare surveillance guidelines, will obtain nuclear stress test to rule out coronary ischemia.     Hypertension:  Optimize control with BB, ARB as permitted above in conjunction with CAD treatment.     Hyperlipidemia:  Optimize statin as within guidelines of CAD treatment as above.    Continue Jardiance 25 mg daily for type 2 DM and CAD treatment.    Overall, based on prior history of obstructive coronary arterial disease, patient is at at high risk for recurrent events and will require consistent ongoing guidelines directed medical therapy to reduce his cardiovascular mortality and associated complications.    I will see patient back in clinic with lab tests and studies results in 6 months.    I thank you for referring patient to our Cardiology Clinic today.      Sandie Bobo MD.   [facility].  [phone]  [place], [place].

## 2022-01-12 ENCOUNTER — APPOINTMENT (OUTPATIENT)
Dept: RADIOLOGY | Facility: MEDICAL CENTER | Age: 57
End: 2022-01-12
Attending: INTERNAL MEDICINE
Payer: COMMERCIAL

## 2023-08-09 ENCOUNTER — TELEPHONE (OUTPATIENT)
Dept: HEALTH INFORMATION MANAGEMENT | Facility: OTHER | Age: 58
End: 2023-08-09
Payer: MEDICARE